# Patient Record
Sex: FEMALE | Race: WHITE | NOT HISPANIC OR LATINO | Employment: UNEMPLOYED | URBAN - METROPOLITAN AREA
[De-identification: names, ages, dates, MRNs, and addresses within clinical notes are randomized per-mention and may not be internally consistent; named-entity substitution may affect disease eponyms.]

---

## 2020-12-10 ENCOUNTER — CONVERSION ENCOUNTER (OUTPATIENT)
Dept: OTOLARYNGOLOGY | Facility: CLINIC | Age: 50
End: 2020-12-10
Attending: NURSE PRACTITIONER

## 2021-05-10 NOTE — H&P
History and Physical      Patient Name: Holly Apple   Patient ID: [id number]   Sex: Female   YOB: 1970    Primary Care Provider: Abimael Doty MD   Referring Provider: Caitie CANNON    Visit Date: December 10, 2020    Provider: KASSANDRA Guajardo   Location: [facility]   Location Address: [address] [place] [address], [address] [place] [address]  [id number]   Location Phone: [phone]          Chief Complaint     1.  Right otalgia    2.  Hearing loss       History Of Present Illness  Holly Apple is a 50 year old female who presents to the office today as a consult from Caitie CANNON.      She presents the clinic today for evaluation of her ears.  She reports she has had intermittent issues with right-sided otalgia for the past 2 years.  She reports that she feels pressure in her ear and almost a constant irritation.  She states that sometimes she will have sharp pains and she feels like her hearing has worsened.  She denies issues with recurrent otitis media infections or otorrhea.  She does have issues with allergic rhinitis and occasional sinusitis infections.  She was recently treated with an antibiotic that she states did not improve her ear symptoms.  She does often have some clear rhinitis and postnasal drainage.  She recently switched from taking Allegra daily to Xyzal.  She does not use any nasal sprays.  She states that she feels like she has hearing loss because her mother yells around her a lot when she was a child.       Past Medical History  Ear pain, right         Past Surgical History  *No Past Surgical History         Medication List  lisinopril 30 mg oral tablet         Allergy List  NO KNOWN DRUG ALLERGIES         Family Medical History  Family history of diabetes mellitus         Social History  Tobacco (Former)         Review of Systems  · Constitutional  o Denies  o : fever, night sweats, weight loss  · Eyes  o Admits  o : impaired  "vision  o Denies  o : discharge from eye  · HENT  o Admits  o : *See HPI  · Cardiovascular  o Admits  o : irregular heart beats  o Denies  o : chest pain  · Respiratory  o Denies  o : shortness of breath, wheezing, coughing up blood  · Gastrointestinal  o Admits  o : heartburn, reflux  o Denies  o : vomiting blood  · Genitourinary  o Admits  o : frequency  · Integument  o Admits  o : rash, skin dryness  · Neurologic  o Admits  o : dizziness  o Denies  o : seizures, loss of balance, loss of consciousness  · Endocrine  o Denies  o : cold intolerance, heat intolerance  · Heme-Lymph  o Denies  o : easy bleeding, anemia      Vitals  Date Time BP Position Site L\R Cuff Size HR RR TEMP (F) WT  HT  BMI kg/m2 BSA m2 O2 Sat FR L/min FiO2 HC       12/10/2020 11:39 AM        97.1 200lbs 8oz 5'  4.5\" 33.88 2.03             Physical Examination  · Constitutional  o Appearance  o : well developed, well-nourished, alert and in no acute distress, voice clear and strong  · Head and Face  o Head  o :   § Inspection  § : no deformities or lesions  o Face  o :   § Inspection  § : No facial lesions; House-Brackmann I/VI bilaterally  § Palpation  § : Right-sided TMJ tenderness, no crepitus, right-sided  muscle tenderness  · Eyes  o Vision  o :   § Visual Fields  § : Extraocular movements are intact. No spontaneous or gaze-induced nystagmus.  o Conjunctivae  o : clear  o Sclerae  o : clear  o Pupils and Irises  o : pupils equal, round, and reactive to light.   · Ears, Nose, Mouth and Throat  o Ears  o :   § External Ears  § : appearance within normal limits, no lesions present  § Otoscopic Examination  § : tympanic membrane appearance within normal limits bilaterally without perforations, well-aerated middle ears  § Hearing  § : intact to conversational voice both ears. Tuning fork testing: Hurtado: No lateralization. Rinne: Positive bilaterally.  o Nose  o :   § External Nose  § : appearance normal  § Intranasal Exam  § : mucosa " within normal limits, vestibules normal, no intranasal lesions present, septum midline, sinuses non tender to percussion  o Oral Cavity  o :   § Oral Mucosa  § : oral mucosa normal without pallor or cyanosis  § Lips  § : lip appearance normal  § Teeth  § : normal dentition for age  § Gums  § : gums pink, non-swollen, no bleeding present  § Tongue  § : tongue appearance normal; normal mobility  § Palate  § : hard palate normal, soft palate appearance normal with symmetric mobility  o Throat  o :   § Oropharynx  § : no inflammation or lesions present, tonsils within normal limits  · Neck  o Inspection/Palpation  o : normal appearance, no masses or tenderness, trachea midline; thyroid size normal, nontender, no nodules or masses present on palpation  · Respiratory  o Respiratory Effort  o : breathing unlabored  o Inspection of Chest  o : normal appearance, no retractions  · Lymphatic  o Neck  o : no lymphadenopathy present  o Supraclavicular Nodes  o : no lymphadenopathy present  o Preauricular Nodes  o : no lymphadenopathy present  · Skin and Subcutaneous Tissue  o General Inspection  o : Regarding face and neck - there are no rashes present, no lesions present, and no areas of discoloration  · Neurologic  o Cranial Nerves  o : cranial nerves II-XII are grossly intact bilaterally  o Gait and Station  o : normal gait, able to stand without diffculty  · Psychiatric  o Judgement and Insight  o : judgment and insight intact  o Mood and Affect  o : mood normal, affect appropriate          Assessment  · TMJ syndrome     524.69/M26.629  · Otalgia, right     388.70/H92.01      Plan  · Orders  o Audiometry, pure-tone (threshold); [facility] (82799) - 388.70/H92.01 - 12/10/2020  o Tympanogram (Impedance Testing) [facility] ([id number]) - 388.70/H92.01 - 12/10/2020  · Medications  o diclofenac sodium 50 mg oral tablet,delayed release (DR/EC)   SIG: take 1 tablet (50 mg) by oral route 2 times per day for 10 days   DISP: (20) Tablet with 0  refills  Prescribed on 12/10/2020     o Medications have been Reconciled  o Transition of Care or Provider Policy  · Instructions  o She presents the clinic today for evaluation of her ears. She reports she has had intermittent issues with right-sided otalgia for the past 2 years. She reports that she feels pressure in her ear and almost a constant irritation. She states that sometimes she will have sharp pains and she feels like her hearing has worsened. She denies issues with recurrent otitis media infections or otorrhea. She does have issues with allergic rhinitis and occasional sinusitis infections. She was recently treated with an antibiotic that she states did not improve her ear symptoms. She does often have some clear rhinitis and postnasal drainage. She recently switched from taking Allegra daily to Xyzal. She does not use any nasal sprays. She states that she feels like she has hearing loss because her mother yells around her a lot when she was a child. On examination today bilateral external auditory canals and bilateral tympanic membrane appearance is within normal limits. There are no perforations and middle ears are well aerated. Tuning fork testing shows Hurtado with no lateralization and Rinne positive bilaterally. She does have tenderness around her right side temporomandibular joint and right  muscles. There is no TMJ crepitus. We did obtain an audiogram and tympanogram. The audiogram shows normal hearing bilaterally. Speech reception threshold was at 20 dB on the right and 15 dB on the left. Word discrimination scores were 96% on the right and 100% on the left to 5 dB. Tympanograms were normal bilaterally. We discussed the normal results of her testing and physical exam today. I feel like her right-sided otalgia is likely coming from temporomandibular joint inflammation. I will start her on a short course of diclofenac to see if this helps with the pain. Additionally have instructed her to  go on a soft food diet for period of 4 to 6 weeks trial warm compresses,  muscle massage and gentle stretching. I will plan to see her back on an as-needed basis.  o Electronically Identified Patient Education Materials Provided Electronically  · Correspondence  o ENT Letter to Referring MD (Caitie CANNON) - 12/10/2020            Electronically Signed by: KASSANDRA Guajardo -Author on December 10, 2020 01:06:45 PM

## 2021-05-13 ENCOUNTER — OFFICE VISIT CONVERTED (OUTPATIENT)
Dept: CARDIOLOGY | Facility: CLINIC | Age: 51
End: 2021-05-13
Attending: SPECIALIST

## 2021-05-14 VITALS — HEIGHT: 64 IN | WEIGHT: 200.5 LBS | TEMPERATURE: 97.1 F | BODY MASS INDEX: 34.23 KG/M2

## 2021-05-22 ENCOUNTER — TRANSCRIBE ORDERS (OUTPATIENT)
Dept: LAB | Facility: HOSPITAL | Age: 51
End: 2021-05-22

## 2021-05-22 ENCOUNTER — TRANSCRIBE ORDERS (OUTPATIENT)
Dept: ADMINISTRATIVE | Facility: HOSPITAL | Age: 51
End: 2021-05-22

## 2021-05-22 ENCOUNTER — TRANSCRIBE ORDERS (OUTPATIENT)
Dept: CARDIOLOGY | Facility: CLINIC | Age: 51
End: 2021-05-22

## 2021-05-22 DIAGNOSIS — R00.2 PALPITATIONS: ICD-10-CM

## 2021-05-22 DIAGNOSIS — Z01.818 PRE-OP TESTING: Primary | ICD-10-CM

## 2021-05-22 DIAGNOSIS — R07.9 CHEST PAIN, UNSPECIFIED TYPE: Primary | ICD-10-CM

## 2021-05-22 DIAGNOSIS — Z12.39 SCREENING BREAST EXAMINATION: Primary | ICD-10-CM

## 2021-05-23 ENCOUNTER — TRANSCRIBE ORDERS (OUTPATIENT)
Dept: CARDIOLOGY | Facility: CLINIC | Age: 51
End: 2021-05-23

## 2021-05-23 DIAGNOSIS — R07.9 CHEST PAIN, UNSPECIFIED TYPE: ICD-10-CM

## 2021-05-23 DIAGNOSIS — R00.2 PALPITATIONS: Primary | ICD-10-CM

## 2021-06-05 NOTE — H&P
History and Physical      Patient Name: Holly Apple   Patient ID: [id number]   Sex: Female   YOB: 1970    Primary Care Provider: Abimael Doty MD   Referring Provider: Abimael Doty MD    Visit Date: May 13, 2021    Provider: Micah Almazan MD   Location: [facility]   Location Address: [address] [place] [address], [address]   [place]  [id number]   Location Phone: [phone]          Chief Complaint     Chest pain.  Palpitations.  Shortness of breath.       History Of Present Illness  Consult requested by: Abimael Doty MD   Holly Apple is a 50 year old /White female with a history of mitral valve prolapse who has had chest pain on and off for the last several months. It is substernal, aching, lasts for a few seconds to a few minutes, relieves spontaneously. She also has had palpitations, which last for a few minutes to several minutes with shortness of breath, which lasts for a few minutes. No aggravating or alleviating factors. No syncopal or presyncopal episodes. Cardiac risk factors: History of hypertension positive. Prior smoker. No history of diabetes mellitus. Family history for coronary artery disease is positive.   PAST MEDICAL HISTORY: Arthritis; Hypertension; Mitral valve prolapse.   FAMILY HISTORY: Positive for diabetes mellitus.   PSYCHOSOCIAL HISTORY: Previous tobacco use, but quit. Rarely consumes alcohol. Daily caffeine. .   CURRENT MEDICATIONS: Allegra 180 mg daily; lisinopril 30 mg daily.   ALLERGIES: Diflucan.       Review of Systems  · Constitutional  o Admits  o : fatigue, good general health lately, recent weight changes   · Eyes  o Admits  o : blurred vision  · HENT  o Denies  o : hearing loss or ringing, chronic sinus problem, swollen glands in neck  · Cardiovascular  o Admits  o : chest pain, palpitations (fast, fluttering, or skipping beats), shortness of breath while walking or lying flat  o Denies  o : swelling (feet, ankles,  "hands)  · Respiratory  o Admits  o : asthma or wheezing  o Denies  o : COPD  · Gastrointestinal  o Denies  o : ulcers, nausea or vomiting  · Neurologic  o Admits  o : lightheaded or dizzy  o Denies  o : stroke, headaches  · Musculoskeletal  o Admits  o : joint pain, back pain  · Endocrine  o Admits  o : heat or cold intolerance, excessive thirst or urination  o Denies  o : thyroid disease, diabetes  · Heme-Lymph  o Denies  o : bleeding or bruising tendency, anemia      Vitals  Date Time BP Position Site L\R Cuff Size HR RR TEMP (F) WT  HT  BMI kg/m2 BSA m2 O2 Sat FR L/min FiO2 HC       05/13/2021 12:51 /76 Sitting    94 - R   200lbs 0oz 5'  4\" 34.33 2.02             Physical Examination  · Constitutional  o Appearance  o : Awake, alert, cooperative, pleasant.  · Eyes  o Pupils and Irises  o : Pupils equal and reacting to light and accommodation.  · Ears, Nose, Mouth and Throat  o Ears  o : Tympanic membranes are normal.  o Nose  o : Clear with no maxillary tenderness.  o Oral Cavity  o : Clear.  · Neck  o Inspection/Palpation  o : No JVD, bruits, thyromegaly, or adenopathy. Trachea midline. No axillary or cervical lymphadenopathy.  o Thyroid  o : No thyroid enlargement.   · Respiratory  o Inspection of Chest  o : No chest wall deformities, moving equal.  o Auscultation of Lungs  o : Good air entry with vesicular breath sounds.  o Percussion of Chest  o : Resonant bilaterally.   o Palpation of Chest  o : Equal movements bilaterally.  · Cardiovascular  o Heart  o :   § Auscultation of Heart  § : PMI is in the 5th intercostal space. S1 and S2 regular. No S3. No S4.   o Peripheral Vascular System  o :   § Extremities  § : No pedal edema. Peripheral pulses well felt. No cyanosis.  · Gastrointestinal  o Abdominal Examination  o : No organomegaly, masses, tenderness, bruits, or abnormal pulsations. Bowel sounds are normal.  · Musculoskeletal  o General  o : Muscle strength is normal with normal tone.  · Skin and " Subcutaneous Tissue  o General Inspection  o : No rash, petechiae, or suspicious skin lesions. Skin turgor is normal.  · EKG  o EKG  o : Performed in the office today.  o Indications  o : Palpitations.  o Results  o : Sinus rhythm. Q waves in the inferior leads.           Assessment     ASSESSMENT & PLAN:    1.  Precordial atypical chest pain and abnormal EKG.  We will do a treadmill stress test to rule out any        significant ischemia.    2.  Palpitations and shortness of breath.  We will do an echocardiogram to evaluate the left ventricular systolic        function and a 24-hour Holter.  3.  Essential hypertension, controlled.  Continue current dose of lisinopril.  4.  Positive nicotine use.  Smoking cessation instructions were discussed with the patient.             Electronically Signed by: Rossi Wolf-, Other -Author on May 15, 2021 11:42:46 AM  Electronically Co-signed by: Micah Almazan MD -Reviewer on May 17, 2021 08:47:52 AM

## 2021-06-07 ENCOUNTER — LAB (OUTPATIENT)
Dept: LAB | Facility: HOSPITAL | Age: 51
End: 2021-06-07

## 2021-06-07 DIAGNOSIS — Z01.818 PRE-OP TESTING: ICD-10-CM

## 2021-06-07 PROCEDURE — U0003 INFECTIOUS AGENT DETECTION BY NUCLEIC ACID (DNA OR RNA); SEVERE ACUTE RESPIRATORY SYNDROME CORONAVIRUS 2 (SARS-COV-2) (CORONAVIRUS DISEASE [COVID-19]), AMPLIFIED PROBE TECHNIQUE, MAKING USE OF HIGH THROUGHPUT TECHNOLOGIES AS DESCRIBED BY CMS-2020-01-R: HCPCS | Performed by: NURSE PRACTITIONER

## 2021-06-07 PROCEDURE — C9803 HOPD COVID-19 SPEC COLLECT: HCPCS

## 2021-06-07 RX ORDER — FEXOFENADINE HCL 180 MG/1
180 TABLET ORAL NIGHTLY
COMMUNITY

## 2021-06-07 RX ORDER — LISINOPRIL 30 MG/1
30 TABLET ORAL DAILY
COMMUNITY
End: 2022-07-27

## 2021-06-08 LAB — SARS-COV-2 RNA RESP QL NAA+PROBE: NOT DETECTED

## 2021-06-11 ENCOUNTER — ANESTHESIA (OUTPATIENT)
Dept: PERIOP | Facility: HOSPITAL | Age: 51
End: 2021-06-11

## 2021-06-11 ENCOUNTER — ANESTHESIA EVENT (OUTPATIENT)
Dept: PERIOP | Facility: HOSPITAL | Age: 51
End: 2021-06-11

## 2021-06-11 ENCOUNTER — HOSPITAL ENCOUNTER (INPATIENT)
Facility: HOSPITAL | Age: 51
LOS: 1 days | Discharge: HOME | End: 2021-06-12
Attending: OBSTETRICS & GYNECOLOGY | Admitting: OBSTETRICS & GYNECOLOGY

## 2021-06-11 DIAGNOSIS — Z90.710 S/P VAGINAL HYSTERECTOMY: ICD-10-CM

## 2021-06-11 DIAGNOSIS — N81.4 UTERINE PROLAPSE WITHOUT VAGINAL WALL PROLAPSE: Primary | ICD-10-CM

## 2021-06-11 PROBLEM — N75.0 BARTHOLIN GLAND CYST: Status: ACTIVE | Noted: 2021-06-11

## 2021-06-11 PROBLEM — I10 ESSENTIAL HYPERTENSION: Status: ACTIVE | Noted: 2021-06-11

## 2021-06-11 LAB — B-HCG UR QL: NEGATIVE

## 2021-06-11 PROCEDURE — 81025 URINE PREGNANCY TEST: CPT | Performed by: OBSTETRICS & GYNECOLOGY

## 2021-06-11 PROCEDURE — 25010000003 CEFAZOLIN IN DEXTROSE 2-4 GM/100ML-% SOLUTION: Performed by: OBSTETRICS & GYNECOLOGY

## 2021-06-11 PROCEDURE — 25010000002 NEOSTIGMINE 10 MG/10ML SOLUTION 10 ML VIAL: Performed by: NURSE ANESTHETIST, CERTIFIED REGISTERED

## 2021-06-11 PROCEDURE — 25010000002 FENTANYL CITRATE (PF) 50 MCG/ML SOLUTION: Performed by: NURSE ANESTHETIST, CERTIFIED REGISTERED

## 2021-06-11 PROCEDURE — 0UT97ZZ RESECTION OF UTERUS, VIA NATURAL OR ARTIFICIAL OPENING: ICD-10-PCS | Performed by: OBSTETRICS & GYNECOLOGY

## 2021-06-11 PROCEDURE — 25010000002 HYDROMORPHONE 1 MG/ML SOLUTION: Performed by: OBSTETRICS & GYNECOLOGY

## 2021-06-11 PROCEDURE — 25010000002 DEXAMETHASONE PER 1 MG: Performed by: NURSE ANESTHETIST, CERTIFIED REGISTERED

## 2021-06-11 PROCEDURE — 25010000002 HYDROMORPHONE 1 MG/ML SOLUTION: Performed by: NURSE ANESTHETIST, CERTIFIED REGISTERED

## 2021-06-11 PROCEDURE — 88305 TISSUE EXAM BY PATHOLOGIST: CPT | Performed by: OBSTETRICS & GYNECOLOGY

## 2021-06-11 PROCEDURE — 25010000002 ONDANSETRON PER 1 MG: Performed by: NURSE ANESTHETIST, CERTIFIED REGISTERED

## 2021-06-11 PROCEDURE — 25010000002 MIDAZOLAM PER 1MG: Performed by: ANESTHESIOLOGY

## 2021-06-11 PROCEDURE — 0UBLXZZ EXCISION OF VESTIBULAR GLAND, EXTERNAL APPROACH: ICD-10-PCS | Performed by: OBSTETRICS & GYNECOLOGY

## 2021-06-11 PROCEDURE — 25010000002 PROPOFOL 10 MG/ML EMULSION: Performed by: NURSE ANESTHETIST, CERTIFIED REGISTERED

## 2021-06-11 PROCEDURE — 93005 ELECTROCARDIOGRAM TRACING: CPT | Performed by: OBSTETRICS & GYNECOLOGY

## 2021-06-11 RX ORDER — SODIUM CHLORIDE 0.9 % (FLUSH) 0.9 %
10 SYRINGE (ML) INJECTION AS NEEDED
Status: DISCONTINUED | OUTPATIENT
Start: 2021-06-11 | End: 2021-06-11 | Stop reason: HOSPADM

## 2021-06-11 RX ORDER — MIDAZOLAM HYDROCHLORIDE 1 MG/ML
2 INJECTION INTRAMUSCULAR; INTRAVENOUS ONCE
Status: COMPLETED | OUTPATIENT
Start: 2021-06-11 | End: 2021-06-11

## 2021-06-11 RX ORDER — OXYCODONE HYDROCHLORIDE 5 MG/1
5 TABLET ORAL ONCE AS NEEDED
Status: DISCONTINUED | OUTPATIENT
Start: 2021-06-11 | End: 2021-06-11 | Stop reason: HOSPADM

## 2021-06-11 RX ORDER — GLYCOPYRROLATE 0.2 MG/ML
INJECTION INTRAMUSCULAR; INTRAVENOUS AS NEEDED
Status: DISCONTINUED | OUTPATIENT
Start: 2021-06-11 | End: 2021-06-11 | Stop reason: SURG

## 2021-06-11 RX ORDER — NEOSTIGMINE METHYLSULFATE 1 MG/ML
INJECTION, SOLUTION INTRAVENOUS AS NEEDED
Status: DISCONTINUED | OUTPATIENT
Start: 2021-06-11 | End: 2021-06-11 | Stop reason: SURG

## 2021-06-11 RX ORDER — CEFAZOLIN SODIUM 2 G/100ML
2 INJECTION, SOLUTION INTRAVENOUS EVERY 8 HOURS
Status: COMPLETED | OUTPATIENT
Start: 2021-06-11 | End: 2021-06-12

## 2021-06-11 RX ORDER — ACETAMINOPHEN 500 MG
1000 TABLET ORAL ONCE
Status: COMPLETED | OUTPATIENT
Start: 2021-06-11 | End: 2021-06-11

## 2021-06-11 RX ORDER — IBUPROFEN 800 MG/1
800 TABLET ORAL EVERY 8 HOURS PRN
Qty: 60 TABLET | Refills: 0 | OUTPATIENT
Start: 2021-06-11 | End: 2022-09-15

## 2021-06-11 RX ORDER — ONDANSETRON 2 MG/ML
4 INJECTION INTRAMUSCULAR; INTRAVENOUS ONCE AS NEEDED
Status: DISCONTINUED | OUTPATIENT
Start: 2021-06-11 | End: 2021-06-11 | Stop reason: HOSPADM

## 2021-06-11 RX ORDER — SODIUM CHLORIDE 0.9 % (FLUSH) 0.9 %
10 SYRINGE (ML) INJECTION EVERY 12 HOURS SCHEDULED
Status: DISCONTINUED | OUTPATIENT
Start: 2021-06-11 | End: 2021-06-11 | Stop reason: HOSPADM

## 2021-06-11 RX ORDER — OXYCODONE HYDROCHLORIDE AND ACETAMINOPHEN 5; 325 MG/1; MG/1
1 TABLET ORAL EVERY 4 HOURS PRN
Status: DISCONTINUED | OUTPATIENT
Start: 2021-06-11 | End: 2021-06-12 | Stop reason: HOSPADM

## 2021-06-11 RX ORDER — FENTANYL CITRATE 50 UG/ML
INJECTION, SOLUTION INTRAMUSCULAR; INTRAVENOUS AS NEEDED
Status: DISCONTINUED | OUTPATIENT
Start: 2021-06-11 | End: 2021-06-11 | Stop reason: SURG

## 2021-06-11 RX ORDER — DEXAMETHASONE SODIUM PHOSPHATE 4 MG/ML
INJECTION, SOLUTION INTRA-ARTICULAR; INTRALESIONAL; INTRAMUSCULAR; INTRAVENOUS; SOFT TISSUE AS NEEDED
Status: DISCONTINUED | OUTPATIENT
Start: 2021-06-11 | End: 2021-06-11 | Stop reason: SURG

## 2021-06-11 RX ORDER — ONDANSETRON 2 MG/ML
4 INJECTION INTRAMUSCULAR; INTRAVENOUS EVERY 6 HOURS PRN
Status: DISCONTINUED | OUTPATIENT
Start: 2021-06-11 | End: 2021-06-11

## 2021-06-11 RX ORDER — ACETAMINOPHEN 325 MG/1
650 TABLET ORAL EVERY 6 HOURS
Status: DISCONTINUED | OUTPATIENT
Start: 2021-06-11 | End: 2021-06-12 | Stop reason: HOSPADM

## 2021-06-11 RX ORDER — MEPERIDINE HYDROCHLORIDE 25 MG/ML
12.5 INJECTION INTRAMUSCULAR; INTRAVENOUS; SUBCUTANEOUS
Status: DISCONTINUED | OUTPATIENT
Start: 2021-06-11 | End: 2021-06-11 | Stop reason: HOSPADM

## 2021-06-11 RX ORDER — ONDANSETRON 4 MG/1
4 TABLET, FILM COATED ORAL EVERY 6 HOURS PRN
Status: DISCONTINUED | OUTPATIENT
Start: 2021-06-11 | End: 2021-06-12 | Stop reason: HOSPADM

## 2021-06-11 RX ORDER — CEFAZOLIN SODIUM 2 G/100ML
2 INJECTION, SOLUTION INTRAVENOUS ONCE
Status: COMPLETED | OUTPATIENT
Start: 2021-06-11 | End: 2021-06-11

## 2021-06-11 RX ORDER — LISINOPRIL 10 MG/1
30 TABLET ORAL DAILY
Status: DISCONTINUED | OUTPATIENT
Start: 2021-06-11 | End: 2021-06-12 | Stop reason: HOSPADM

## 2021-06-11 RX ORDER — ONDANSETRON 2 MG/ML
INJECTION INTRAMUSCULAR; INTRAVENOUS AS NEEDED
Status: DISCONTINUED | OUTPATIENT
Start: 2021-06-11 | End: 2021-06-11 | Stop reason: SURG

## 2021-06-11 RX ORDER — LIDOCAINE HYDROCHLORIDE 20 MG/ML
INJECTION, SOLUTION INFILTRATION; PERINEURAL AS NEEDED
Status: DISCONTINUED | OUTPATIENT
Start: 2021-06-11 | End: 2021-06-11 | Stop reason: SURG

## 2021-06-11 RX ORDER — SODIUM CHLORIDE, SODIUM LACTATE, POTASSIUM CHLORIDE, CALCIUM CHLORIDE 600; 310; 30; 20 MG/100ML; MG/100ML; MG/100ML; MG/100ML
125 INJECTION, SOLUTION INTRAVENOUS CONTINUOUS
Status: DISCONTINUED | OUTPATIENT
Start: 2021-06-11 | End: 2021-06-11 | Stop reason: SDUPTHER

## 2021-06-11 RX ORDER — DEXMEDETOMIDINE HYDROCHLORIDE 100 UG/ML
INJECTION, SOLUTION INTRAVENOUS AS NEEDED
Status: DISCONTINUED | OUTPATIENT
Start: 2021-06-11 | End: 2021-06-11 | Stop reason: SURG

## 2021-06-11 RX ORDER — IBUPROFEN 600 MG/1
600 TABLET ORAL EVERY 6 HOURS SCHEDULED
Status: DISCONTINUED | OUTPATIENT
Start: 2021-06-11 | End: 2021-06-12 | Stop reason: HOSPADM

## 2021-06-11 RX ORDER — ACETAMINOPHEN 650 MG/1
650 SUPPOSITORY RECTAL EVERY 6 HOURS
Status: DISCONTINUED | OUTPATIENT
Start: 2021-06-11 | End: 2021-06-12 | Stop reason: HOSPADM

## 2021-06-11 RX ORDER — SODIUM CHLORIDE, SODIUM LACTATE, POTASSIUM CHLORIDE, CALCIUM CHLORIDE 600; 310; 30; 20 MG/100ML; MG/100ML; MG/100ML; MG/100ML
9 INJECTION, SOLUTION INTRAVENOUS CONTINUOUS PRN
Status: DISCONTINUED | OUTPATIENT
Start: 2021-06-11 | End: 2021-06-11 | Stop reason: HOSPADM

## 2021-06-11 RX ORDER — PROPOFOL 10 MG/ML
VIAL (ML) INTRAVENOUS AS NEEDED
Status: DISCONTINUED | OUTPATIENT
Start: 2021-06-11 | End: 2021-06-11 | Stop reason: SURG

## 2021-06-11 RX ORDER — OXYCODONE HYDROCHLORIDE AND ACETAMINOPHEN 5; 325 MG/1; MG/1
1 TABLET ORAL EVERY 6 HOURS PRN
Qty: 20 TABLET | Refills: 0 | Status: SHIPPED | OUTPATIENT
Start: 2021-06-11 | End: 2022-07-27

## 2021-06-11 RX ORDER — KETAMINE HYDROCHLORIDE 50 MG/ML
INJECTION, SOLUTION, CONCENTRATE INTRAMUSCULAR; INTRAVENOUS AS NEEDED
Status: DISCONTINUED | OUTPATIENT
Start: 2021-06-11 | End: 2021-06-11 | Stop reason: SURG

## 2021-06-11 RX ORDER — KETOROLAC TROMETHAMINE 30 MG/ML
30 INJECTION, SOLUTION INTRAMUSCULAR; INTRAVENOUS EVERY 6 HOURS PRN
Status: DISCONTINUED | OUTPATIENT
Start: 2021-06-11 | End: 2021-06-12 | Stop reason: HOSPADM

## 2021-06-11 RX ORDER — ROCURONIUM BROMIDE 10 MG/ML
INJECTION, SOLUTION INTRAVENOUS AS NEEDED
Status: DISCONTINUED | OUTPATIENT
Start: 2021-06-11 | End: 2021-06-11 | Stop reason: SURG

## 2021-06-11 RX ORDER — SODIUM CHLORIDE 0.9 % (FLUSH) 0.9 %
3 SYRINGE (ML) INJECTION EVERY 12 HOURS SCHEDULED
Status: DISCONTINUED | OUTPATIENT
Start: 2021-06-11 | End: 2021-06-11 | Stop reason: HOSPADM

## 2021-06-11 RX ORDER — NALOXONE HCL 0.4 MG/ML
0.1 VIAL (ML) INJECTION
Status: DISCONTINUED | OUTPATIENT
Start: 2021-06-11 | End: 2021-06-12 | Stop reason: HOSPADM

## 2021-06-11 RX ORDER — SODIUM CHLORIDE, SODIUM LACTATE, POTASSIUM CHLORIDE, CALCIUM CHLORIDE 600; 310; 30; 20 MG/100ML; MG/100ML; MG/100ML; MG/100ML
125 INJECTION, SOLUTION INTRAVENOUS CONTINUOUS
Status: DISCONTINUED | OUTPATIENT
Start: 2021-06-11 | End: 2021-06-12 | Stop reason: HOSPADM

## 2021-06-11 RX ORDER — GINSENG 100 MG
CAPSULE ORAL AS NEEDED
Status: DISCONTINUED | OUTPATIENT
Start: 2021-06-11 | End: 2021-06-11 | Stop reason: HOSPADM

## 2021-06-11 RX ADMIN — ACETAMINOPHEN 1000 MG: 500 TABLET ORAL at 08:40

## 2021-06-11 RX ADMIN — GLYCOPYRROLATE 0.4 MCG: 0.2 INJECTION INTRAMUSCULAR; INTRAVENOUS at 11:01

## 2021-06-11 RX ADMIN — ACETAMINOPHEN 650 MG: 325 TABLET ORAL at 21:37

## 2021-06-11 RX ADMIN — DEXAMETHASONE SODIUM PHOSPHATE 4 MG: 4 INJECTION INTRA-ARTICULAR; INTRALESIONAL; INTRAMUSCULAR; INTRAVENOUS; SOFT TISSUE at 09:56

## 2021-06-11 RX ADMIN — LIDOCAINE HYDROCHLORIDE 100 MG: 20 INJECTION, SOLUTION INFILTRATION; PERINEURAL at 09:51

## 2021-06-11 RX ADMIN — SODIUM CHLORIDE, POTASSIUM CHLORIDE, SODIUM LACTATE AND CALCIUM CHLORIDE 9 ML/HR: 600; 310; 30; 20 INJECTION, SOLUTION INTRAVENOUS at 08:41

## 2021-06-11 RX ADMIN — HYDROMORPHONE HYDROCHLORIDE 0.5 MG: 1 INJECTION, SOLUTION INTRAMUSCULAR; INTRAVENOUS; SUBCUTANEOUS at 15:14

## 2021-06-11 RX ADMIN — IBUPROFEN 600 MG: 600 TABLET, FILM COATED ORAL at 23:30

## 2021-06-11 RX ADMIN — CEFAZOLIN SODIUM 2 G: 2 INJECTION, SOLUTION INTRAVENOUS at 20:09

## 2021-06-11 RX ADMIN — SODIUM CHLORIDE, POTASSIUM CHLORIDE, SODIUM LACTATE AND CALCIUM CHLORIDE 125 ML/HR: 600; 310; 30; 20 INJECTION, SOLUTION INTRAVENOUS at 11:49

## 2021-06-11 RX ADMIN — KETAMINE HYDROCHLORIDE 25 MG: 50 INJECTION, SOLUTION INTRAMUSCULAR; INTRAVENOUS at 09:51

## 2021-06-11 RX ADMIN — DEXMEDETOMIDINE HYDROCHLORIDE 25 MCG: 100 INJECTION, SOLUTION INTRAVENOUS at 09:49

## 2021-06-11 RX ADMIN — ONDANSETRON 4 MG: 2 INJECTION INTRAMUSCULAR; INTRAVENOUS at 10:04

## 2021-06-11 RX ADMIN — HYDROMORPHONE HYDROCHLORIDE 0.25 MG: 1 INJECTION, SOLUTION INTRAMUSCULAR; INTRAVENOUS; SUBCUTANEOUS at 12:27

## 2021-06-11 RX ADMIN — DEXMEDETOMIDINE HYDROCHLORIDE 25 MCG: 100 INJECTION, SOLUTION INTRAVENOUS at 10:03

## 2021-06-11 RX ADMIN — KETAMINE HYDROCHLORIDE 25 MG: 50 INJECTION, SOLUTION INTRAMUSCULAR; INTRAVENOUS at 10:12

## 2021-06-11 RX ADMIN — FENTANYL CITRATE 100 MCG: 50 INJECTION INTRAMUSCULAR; INTRAVENOUS at 09:50

## 2021-06-11 RX ADMIN — HYDROMORPHONE HYDROCHLORIDE 0.5 MG: 1 INJECTION, SOLUTION INTRAMUSCULAR; INTRAVENOUS; SUBCUTANEOUS at 11:47

## 2021-06-11 RX ADMIN — PROPOFOL 150 MG: 10 INJECTION, EMULSION INTRAVENOUS at 09:52

## 2021-06-11 RX ADMIN — MIDAZOLAM HYDROCHLORIDE 2 MG: 1 INJECTION, SOLUTION INTRAMUSCULAR; INTRAVENOUS at 09:31

## 2021-06-11 RX ADMIN — SODIUM CHLORIDE, POTASSIUM CHLORIDE, SODIUM LACTATE AND CALCIUM CHLORIDE: 600; 310; 30; 20 INJECTION, SOLUTION INTRAVENOUS at 10:31

## 2021-06-11 RX ADMIN — SODIUM CHLORIDE, POTASSIUM CHLORIDE, SODIUM LACTATE AND CALCIUM CHLORIDE 125 ML/HR: 600; 310; 30; 20 INJECTION, SOLUTION INTRAVENOUS at 21:37

## 2021-06-11 RX ADMIN — IBUPROFEN 600 MG: 600 TABLET, FILM COATED ORAL at 18:38

## 2021-06-11 RX ADMIN — ACETAMINOPHEN 650 MG: 325 TABLET ORAL at 17:07

## 2021-06-11 RX ADMIN — ROCURONIUM BROMIDE 50 MG: 10 INJECTION INTRAVENOUS at 09:52

## 2021-06-11 RX ADMIN — NEOSTIGMINE METHYLSULFATE 4 MG: 1 INJECTION, SOLUTION INTRAVENOUS at 11:01

## 2021-06-11 RX ADMIN — CEFAZOLIN SODIUM 2 G: 2 INJECTION, SOLUTION INTRAVENOUS at 09:49

## 2021-06-11 NOTE — OP NOTE
VAGINAL HYSTERECTOMY  Procedure Report    Patient Name:  Holly Apple  YOB: 1970    Date of Surgery:  6/11/2021     Indications:  Patient is 49 yo female with uterovaginal prolapse and right Bartholin gland cyst.    Pre-op Diagnosis:   UTERINE PROLAPSE.  Right Bartholin gland cyst.       Post-Op Diagnosis Codes:   Same    Procedure/CPT® Codes:    Procedure(s):  VAGINAL HYSTERECTOMY.  Marsupialization of Bartholin gland cyst.    Staff:  Surgeon(s):  Popeye Fox MD         Anesthesia: General    Estimated Blood Loss: 40 mL    Implants:    Nothing was implanted during the procedure    Specimen:          Specimens     ID Source Type Tests Collected By Collected At Frozen?    A Uterus with Cervix Tissue · TISSUE PATHOLOGY EXAM   Popeye Fxo MD 6/11/21 1021 No    Description: VAGINAL HYSTERECTOMY              Findings: 3rd degree uterine prolapse.  5cm right Bartholin gland cyst.  Normal ovaries.    Complications: None    Description of Procedure: Patient was taken to the OR where general anesthesia was obtained without any difficulty.  She was then prepped and draped in high lithotomy position.  Weighted speculum was placed in the vagina.  Cervix was grasped with clamp.  Cervix was then injected with marcaine diluted 50:50 with normal saline.Cervix was then incised sharply circumferencially.  Pubo-cervical fascia was incised sharply and anterior cul de sac was entered sharply.  The karen  rectal fascia was dissected off vaginal mucosae and posterior cul de sac was entered sharply.  Uterosacral ligaments on both sides was clamped, cut and ligated with 0 vicryl sutures.  Uterine arteries on both sides were clamped,cut and ligated with 0 vicryl.  The utero ovarian ligaments were then clamped and uterus and cervix were amputated sharply.  The pedicles were ligated with 0 vicryl in figure of eight fashion.  The pedicle lines on both sides were inspected and hemostasis was assred.  The edges of  the vaginal cuff were approximated with 0 vicryl sutures in interruped fashion.  Fole catheter was placed with return of yellow urine.  At this time attention was turned to performing marsupialization of Bartholin gland.  Vaginal mucosae overlying the Bartholin cyst was injected with marcaine.  The vaginal mucosae was then incised sharply and gland wall was opened sharpy with return of green fluid.  Edges of bartholin gland cyst were approximated to the vulvar mucosae with 3-0 vicryl sutures in interrupted fashion.  Antibiotic ointment was placed on incision site.  Sponge,laps and needles count was correct x2.  Patient was taken to the  in stable condition.            Popeye Fox MD     Date: 6/11/2021  Time: 11:15 EDT

## 2021-06-11 NOTE — ANESTHESIA PREPROCEDURE EVALUATION
Anesthesia Evaluation     Patient summary reviewed and Nursing notes reviewed   NPO Solid Status: > 8 hours  NPO Liquid Status: > 2 hours           Airway   Mallampati: III  TM distance: >3 FB  Dental - normal exam     Pulmonary - normal exam   Cardiovascular - normal exam    (+) hypertension,       Neuro/Psych  GI/Hepatic/Renal/Endo      Musculoskeletal     Abdominal    Substance History      OB/GYN          Other   arthritis,                      Anesthesia Plan    ASA 2     general     intravenous induction     Anesthetic plan, all risks, benefits, and alternatives have been provided, discussed and informed consent has been obtained with: patient and child.

## 2021-06-11 NOTE — ANESTHESIA PROCEDURE NOTES
Airway  Urgency: elective    Date/Time: 6/11/2021 9:54 AM  End Time:6/11/2021 10:55 AM  Airway not difficult    General Information and Staff    Patient location during procedure: OR  CRNA: Vasile Gustafson CRNA    Indications and Patient Condition  Indications for airway management: airway protection    Preoxygenated: yes  MILS maintained throughout  Mask difficulty assessment: 1 - vent by mask    Final Airway Details  Final airway type: endotracheal airway      Successful airway: ETT  Cuffed: yes   Successful intubation technique: video laryngoscopy  Endotracheal tube insertion site: oral  Blade: Glidescope  Blade size: 3  ETT size (mm): 7.5  Cormack-Lehane Classification: grade I - full view of glottis  Placement verified by: chest auscultation, capnometry and palpation of cuff   Measured from: lips  ETT/EBT  to lips (cm): 22  Number of attempts at approach: 1  Assessment: lips, teeth, and gum same as pre-op and atraumatic intubation

## 2021-06-11 NOTE — ANESTHESIA POSTPROCEDURE EVALUATION
Patient: Holly Apple    Procedure Summary     Date: 06/11/21 Room / Location: [facility] OR 04 / [facility] MAIN OR    Anesthesia Start: 0945 Anesthesia Stop: 1109    Procedure: VAGINAL HYSTERECTOMY (N/A Uterus) Diagnosis: (UTERINE PROLAPSE)    Surgeons: Popeye Fox MD Provider: Trina Ann MD    Anesthesia Type: general ASA Status: 2          Anesthesia Type: general    Vitals  Vitals Value Taken Time   BP 91/64 06/11/21 1145   Temp 36.2 °C (97.2 °F) 06/11/21 1109   Pulse 48 06/11/21 1147   Resp 16 06/11/21 1130   SpO2 99 % 06/11/21 1147   Vitals shown include unvalidated device data.        Post Anesthesia Care and Evaluation    Patient location during evaluation: bedside  Patient participation: complete - patient participated  Level of consciousness: awake  Pain score: 0  Pain management: adequate  Airway patency: patent  Anesthetic complications: No anesthetic complications  PONV Status: none  Cardiovascular status: acceptable and stable  Respiratory status: acceptable and room air  Hydration status: acceptable

## 2021-06-11 NOTE — H&P
[facility]   PREOPERATIVE HISTORY AND PHYSICAL    Patient Name:Holly Apple  : 1970  MRN: [id number]  Primary Care Physician: Samuel Frey MD  Date of admission: 2021    Subjective   Subjective     Chief Complaint: preoperative evaluation    History of Present Illness  Holly Apple is a 50 y.o. female who presents for preoperative evaluation. She is scheduled for VAGINAL HYSTERECTOMY (N/A) and marsupilization of Bartholin Gland cyst.    Review of Systems     Personal History     Past Medical History:   Diagnosis Date   • DDD (degenerative disc disease), cervical    • Eczema     GENERALIZED   • Hypertension    • Seasonal allergies    • Tuberculosis     NONACTIVE, NO CURRENT ISSUES, FOLLOWS W/PCP   • Uterine prolapse        Past Surgical History:   Procedure Laterality Date   • WISDOM TOOTH EXTRACTION         Family History: Her family history is not on file.     Social History: She  reports that she has quit smoking. She does not have any smokeless tobacco history on file. She reports that she does not drink alcohol and does not use drugs.    Home Medications:  fexofenadine and lisinopril    Allergies:  She is allergic to diethyl toluamide (deet), diflucan [fluconazole], wasp venom, eggs or egg-derived products, measles virus vaccine, and peanut-containing drug products.    Objective    Objective     Vitals:    Temp:  [96.9 °F (36.1 °C)] 96.9 °F (36.1 °C)  Heart Rate:  [84] 84  Resp:  [16] 16  BP: (132)/(79) 132/79    Physical Exam  Constitutional:       Appearance: Normal appearance.   HENT:      Head: Normocephalic.   Cardiovascular:      Rate and Rhythm: Normal rate and regular rhythm.   Pulmonary:      Effort: Pulmonary effort is normal.      Breath sounds: Normal breath sounds.   Abdominal:      General: Abdomen is flat. Bowel sounds are normal.      Palpations: Abdomen is soft.   Musculoskeletal:      Cervical back: Neck supple.   Neurological:      Mental Status: She is  alert.         Assessment/Plan   Assessment / Plan     Brief Patient Summary:  Holly Apple is a 50 y.o. female who presents for preoperative evaluation.      Active Hospital Problems:  Active Hospital Problems    Diagnosis    • **Uterine prolapse without vaginal wall prolapse    • Bartholin gland cyst      Plan:   Procedure(s):  VAGINAL HYSTERECTOMY    The risks, benefits, and alternatives of the procedure were discussed in detail with the patient and questions were answered. No guarantees were made or implied. Informed consent was obtained.

## 2021-06-12 ENCOUNTER — DOCUMENTATION (OUTPATIENT)
Dept: OBSTETRICS AND GYNECOLOGY | Facility: HOSPITAL | Age: 51
End: 2021-06-12

## 2021-06-12 VITALS
HEIGHT: 64 IN | WEIGHT: 203.48 LBS | OXYGEN SATURATION: 98 % | RESPIRATION RATE: 18 BRPM | SYSTOLIC BLOOD PRESSURE: 110 MMHG | BODY MASS INDEX: 34.74 KG/M2 | TEMPERATURE: 97.5 F | HEART RATE: 98 BPM | DIASTOLIC BLOOD PRESSURE: 62 MMHG

## 2021-06-12 PROBLEM — N75.0 BARTHOLIN GLAND CYST: Status: RESOLVED | Noted: 2021-06-11 | Resolved: 2021-06-12

## 2021-06-12 PROBLEM — N81.4 UTERINE PROLAPSE WITHOUT VAGINAL WALL PROLAPSE: Status: RESOLVED | Noted: 2021-06-11 | Resolved: 2021-06-12

## 2021-06-12 LAB
BASOPHILS # BLD AUTO: 0.01 10*3/MM3 (ref 0–0.2)
BASOPHILS NFR BLD AUTO: 0.1 % (ref 0–1.5)
DEPRECATED RDW RBC AUTO: 39.5 FL (ref 37–54)
EOSINOPHIL # BLD AUTO: 0 10*3/MM3 (ref 0–0.4)
EOSINOPHIL NFR BLD AUTO: 0 % (ref 0.3–6.2)
ERYTHROCYTE [DISTWIDTH] IN BLOOD BY AUTOMATED COUNT: 14.1 % (ref 12.3–15.4)
HCT VFR BLD AUTO: 34 % (ref 34–46.6)
HGB BLD-MCNC: 11.1 G/DL (ref 12–15.9)
IMM GRANULOCYTES # BLD AUTO: 0.05 10*3/MM3 (ref 0–0.05)
IMM GRANULOCYTES NFR BLD AUTO: 0.4 % (ref 0–0.5)
LYMPHOCYTES # BLD AUTO: 1.77 10*3/MM3 (ref 0.7–3.1)
LYMPHOCYTES NFR BLD AUTO: 15 % (ref 19.6–45.3)
MCH RBC QN AUTO: 25.1 PG (ref 26.6–33)
MCHC RBC AUTO-ENTMCNC: 32.6 G/DL (ref 31.5–35.7)
MCV RBC AUTO: 76.9 FL (ref 79–97)
MONOCYTES # BLD AUTO: 0.64 10*3/MM3 (ref 0.1–0.9)
MONOCYTES NFR BLD AUTO: 5.4 % (ref 5–12)
NEUTROPHILS NFR BLD AUTO: 79.1 % (ref 42.7–76)
NEUTROPHILS NFR BLD AUTO: 9.3 10*3/MM3 (ref 1.7–7)
NRBC BLD AUTO-RTO: 0 /100 WBC (ref 0–0.2)
PLATELET # BLD AUTO: 264 10*3/MM3 (ref 140–450)
PMV BLD AUTO: 8.9 FL (ref 6–12)
RBC # BLD AUTO: 4.42 10*6/MM3 (ref 3.77–5.28)
WBC # BLD AUTO: 11.77 10*3/MM3 (ref 3.4–10.8)

## 2021-06-12 PROCEDURE — 94799 UNLISTED PULMONARY SVC/PX: CPT

## 2021-06-12 PROCEDURE — 25010000003 CEFAZOLIN IN DEXTROSE 2-4 GM/100ML-% SOLUTION: Performed by: OBSTETRICS & GYNECOLOGY

## 2021-06-12 PROCEDURE — 85025 COMPLETE CBC W/AUTO DIFF WBC: CPT | Performed by: OBSTETRICS & GYNECOLOGY

## 2021-06-12 RX ADMIN — IBUPROFEN 600 MG: 600 TABLET, FILM COATED ORAL at 12:45

## 2021-06-12 RX ADMIN — ACETAMINOPHEN 650 MG: 325 TABLET ORAL at 03:11

## 2021-06-12 RX ADMIN — ACETAMINOPHEN 650 MG: 325 TABLET ORAL at 09:11

## 2021-06-12 RX ADMIN — LISINOPRIL 30 MG: 10 TABLET ORAL at 11:15

## 2021-06-12 RX ADMIN — CEFAZOLIN SODIUM 2 G: 2 INJECTION, SOLUTION INTRAVENOUS at 01:32

## 2021-06-12 RX ADMIN — IBUPROFEN 600 MG: 600 TABLET, FILM COATED ORAL at 05:34

## 2021-06-12 NOTE — PROGRESS NOTES
[facility]     Progress Note    Patient Name: Holly Apple  : 1970  MRN: [id number]  Primary Care Physician:  Samuel Frey MD  Date of admission: 2021    Subjective  feels well  Subjective     Procedure:  VAGINAL HYSTERECTOMY    Post-op Diagnosis:     Post-Op Diagnosis Codes:     * Uterine prolapse without vaginal wall prolapse [N81.4]     Post-op Day:  1 Day Post-Op      Objective   Objective     Vitals:   Temp:  [97.2 °F (36.2 °C)-98 °F (36.7 °C)] 97.9 °F (36.6 °C)  Heart Rate:  [48-92] 85  Resp:  [14-18] 18  BP: ()/(42-78) 105/64  Flow (L/min):  [2-3] 2    Physical Exam    Constitutional: Awake, alert   Neck: Supple, no thyromegaly, no lymphadenopathy, trachea midline   Respiratory: Clear to auscultation bilaterally, nonlabored respirations    Cardiovascular: RRR, no murmurs, rubs, or gallops, palpable pedal pulses bilaterally   Abdominal: Soft insert   Musculoskeletal: No bilateral ankle edema, no clubbing or cyanosis to extremities   Psychiatric: Appropriate affect, cooperative    Result Review    Result Review:  I have personally reviewed the results from the time of this admission to 2021 09:13 EDT and agree with these findings:  []  Laboratory  []  Microbiology  []  Radiology  []  EKG/Telemetry   []  Cardiology/Vascular   []  Pathology  []  Old records  []  Other:  Most notable findings include: Stable    Assessment/Plan   Assessment / Plan     Brief Patient Summary:  Holly Apple is a 50 y.o. female who recently had vaginal hysterectomy    Active Hospital Problems:  Active Hospital Problems    Diagnosis    • **Uterine prolapse without vaginal wall prolapse    • Bartholin gland cyst        Plan:   Discharge home with a wick    DVT prophylaxis:  Mechanical DVT prophylaxis orders are present.    Disposition:  I expect patient to be discharged today.    Electronically signed by Samuel Narayanan MD, 21, 9:13 AM EDT.

## 2021-06-12 NOTE — PLAN OF CARE
Goal Outcome Evaluation:  Plan of Care Reviewed With: patient        Progress: improving   Pt up and ambulating with staff in tiwari up to nurses station and back to room. 1 person assist to bathroom throughout the shift. No prn meds given for pain. Tolerating po without difficulty.

## 2021-06-12 NOTE — DISCHARGE SUMMARY
[facility]               GYN DISCHARGE SUMMARY    Patient Name: Holly Apple  : 1970  MRN: [id number]    Date of Admission: 2021  Date of Discharge:  2021  Primary Care Physician: Samuel Frey MD    Consults     No orders found for last 30 day(s).          Presenting Problem:   Uterine prolapse without vaginal wall prolapse [N81.4]    Active and Resolved Hospital Problems:  Active Hospital Problems   No active problems to display.      Resolved Hospital Problems    Diagnosis POA   • **Uterine prolapse without vaginal wall prolapse [N81.4] Yes   • Bartholin gland cyst [N75.0] Yes         Hospital Course     Hospital Course:  Holly Apple is a 50 y.o. female status post vaginal hysterectomy and Bartholin cyst removal by Dr. Fox    Procedure:  VAGINAL HYSTERECTOMY    Post-op Diagnosis:     Post-Op Diagnosis Codes:     * Uterine prolapse without vaginal wall prolapse [N81.4]    Day of Discharge     Vital Signs:  Temp:  [97.2 °F (36.2 °C)-98 °F (36.7 °C)] 97.9 °F (36.6 °C)  Heart Rate:  [48-92] 85  Resp:  [14-18] 18  BP: ()/(42-78) 105/64  Flow (L/min):  [2-3] 2    Pertinent  and/or Most Recent Results     LAB RESULTS:      Lab 21  0427   WBC 11.77*   HEMOGLOBIN 11.1*   HEMATOCRIT 34.0   PLATELETS 264   NEUTROS ABS 9.30*   IMMATURE GRANS (ABS) 0.05   LYMPHS ABS 1.77   MONOS ABS 0.64   EOS ABS 0.00   MCV 76.9*                             Brief Urine Lab Results  (Last result in the past 365 days)      Color   Clarity   Blood   Leuk Est   Nitrite   Protein   CREAT   Urine HCG        21 0754               Negative         Microbiology Results (last 10 days)     Procedure Component Value - Date/Time    COVID-19,CEPHEID,COR/MATTHEW/PAD/[facility] IN-HOUSE(OR EMERGENT/ADD-ON),NP SWAB IN TRANSPORT MEDIA 3-4 HR TAT, RT-PCR - Swab, Nasopharynx [[id number]]  (Normal) Collected: 21 2105    Lab Status: Final result Specimen: Swab from Nasopharynx Updated:  06/08/21 0419     COVID19 Not Detected    Narrative:      Fact sheet for providers: https://www.fda.gov/media/219858/download     Fact sheet for patients: https://www.fda.gov/media/629947/download  Fact sheet for providers: https://www.fda.gov/media/646523/download     Fact sheet for patients: https://www.fda.gov/media/689009/download                           Labs Pending at Discharge:  Pending Labs     Order Current Status    Tissue Pathology Exam In process            Discharge Details        Discharge Medications      New Medications      Instructions Start Date   ibuprofen 800 MG tablet  Commonly known as: ADVIL,MOTRIN   800 mg, Oral, Every 8 Hours PRN      oxyCODONE-acetaminophen 5-325 MG per tablet  Commonly known as: Percocet   1 tablet, Oral, Every 6 Hours PRN         Continue These Medications      Instructions Start Date   fexofenadine 180 MG tablet  Commonly known as: ALLEGRA   180 mg, Oral, Nightly      lisinopril 30 MG tablet  Commonly known as: PRINIVIL,ZESTRIL   30 mg, Oral, Daily             Allergies   Allergen Reactions   • Diethyl Toluamide (Deet) Anaphylaxis   • Diflucan [Fluconazole] Swelling   • Wasp Venom Anaphylaxis   • Eggs Or Egg-Derived Products GI Intolerance   • Measles Virus Vaccine Other (See Comments)     DEVELOPED FEVER AS A CHILD   • Peanut-Containing Drug Products Unknown - High Severity     STATES CAUSES THROAT TINGLING         Discharge Disposition:  Home or Self Care, condition good    Diet: Regular        Discharge Activity:         Future Appointments   Date Time Provider Department Center   7/13/2021 10:00 AM [facility] [place] [facility] [place] [facility]   7/13/2021 12:15 PM [facility] [place] E[facility]/VAS 1 MG [facility] [place] [facility]   7/13/2021  2:15 PM [facility] [place] [facility] [place] [facility]   9/16/2021  5:00 PM [facility] [place] 1  [facility]             Electronically signed by Samuel Narayanan MD, 06/12/21, 9:25 AM EDT.

## 2021-06-14 LAB
CYTO UR: NORMAL
LAB AP CASE REPORT: NORMAL
LAB AP CLINICAL INFORMATION: NORMAL
PATH REPORT.FINAL DX SPEC: NORMAL
PATH REPORT.GROSS SPEC: NORMAL

## 2021-06-15 LAB — QT INTERVAL: 393 MS

## 2021-07-15 VITALS
WEIGHT: 200 LBS | BODY MASS INDEX: 34.15 KG/M2 | HEIGHT: 64 IN | SYSTOLIC BLOOD PRESSURE: 108 MMHG | DIASTOLIC BLOOD PRESSURE: 76 MMHG | HEART RATE: 94 BPM

## 2021-09-16 ENCOUNTER — HOSPITAL ENCOUNTER (OUTPATIENT)
Dept: MAMMOGRAPHY | Facility: HOSPITAL | Age: 51
Discharge: HOME | End: 2021-09-16
Admitting: OBSTETRICS & GYNECOLOGY

## 2021-09-16 DIAGNOSIS — Z12.39 SCREENING BREAST EXAMINATION: ICD-10-CM

## 2021-09-16 PROCEDURE — 77067 SCR MAMMO BI INCL CAD: CPT

## 2021-09-16 PROCEDURE — 77063 BREAST TOMOSYNTHESIS BI: CPT

## 2022-07-26 PROBLEM — F17.211 CIGARETTE NICOTINE DEPENDENCE IN REMISSION: Status: ACTIVE | Noted: 2022-07-26

## 2022-07-26 PROBLEM — J30.9 ALLERGIC RHINITIS DUE TO ALLERGEN: Status: ACTIVE | Noted: 2022-07-26

## 2022-07-26 NOTE — PATIENT INSTRUCTIONS
Cooking With Less Salt  Cooking with less salt is one way to reduce the amount of sodium you get from food. Sodium is one of the elements that make up salt. It is found naturally in foods and is also added to certain foods. Depending on your condition and overall health, your health care provider or dietitian may recommend that you reduce your sodium intake. Most people should have less than 2,300 milligrams (mg) of sodium each day. If you have high blood pressure (hypertension), you may need to limit your sodium to 1,500 mg each day. Follow the tips below to help reduce your sodium intake.  What are tips for eating less sodium?  Reading food labels       Check the food label before buying or using packaged ingredients. Always check the label for the serving size and sodium content.  Look for products with no more than 140 mg of sodium in one serving.  Check the % Daily Value column to see what percent of the daily recommended amount of sodium is provided in one serving of the product. Foods with 5% or less in this column are considered low in sodium. Foods with 20% or higher are considered high in sodium.  Do not choose foods with salt as one of the first three ingredients on the ingredients list. If salt is one of the first three ingredients, it usually means the item is high in sodium.     Shopping  Buy sodium-free or low-sodium products. Look for the following words on food labels:  Low-sodium.  Sodium-free.  Reduced-sodium.  No salt added.  Unsalted.  Always check the sodium content even if foods are labeled as low-sodium or no salt added.  Buy fresh foods.  Cooking  Use herbs, seasonings without salt, and spices as substitutes for salt.  Use sodium-free baking soda when baking.  Ephraim, braise, or roast foods to add flavor with less salt.  Avoid adding salt to pasta, rice, or hot cereals.  Drain and rinse canned vegetables, beans, and meat before use.  Avoid adding salt when cooking sweets and desserts.  Cook  "with low-sodium ingredients.  What foods are high in sodium?  Vegetables  Regular canned vegetables (not low-sodium or reduced-sodium). Sauerkraut, pickled vegetables, and relishes. Olives. French fries. Onion rings. Regular canned tomato sauce and paste. Regular tomato and vegetable juice. Frozen vegetables in sauces.  Grains  Instant hot cereals. Bread stuffing, pancake, and biscuit mixes. Croutons. Seasoned rice or pasta mixes. Noodle soup cups. Boxed or frozen macaroni and cheese. Regular salted crackers. Self-rising flour. Rolls. Bagels. Flour tortillas and wraps.  Meats and other proteins  Meat or fish that is salted, canned, smoked, cured, spiced, or pickled. This includes mancia, ham, sausages, hot dogs, corned beef, chipped beef, meat loaves, salt pork, jerky, pickled herring, anchovies, regular canned tuna, and sardines. Salted nuts.  Dairy  Processed cheese and cheese spreads. Cheese curds. Blue cheese. Feta cheese. String cheese. Regular cottage cheese. Buttermilk. Canned milk.  The items listed above may not be a complete list of foods high in sodium. Actual amounts of sodium may be different depending on processing. Contact a dietitian for more information.  What foods are low in sodium?  Fruits  Fresh, frozen, or canned fruit with no sauce added. Fruit juice.  Vegetables  Fresh or frozen vegetables with no sauce added. \"No salt added\" canned vegetables. \"No salt added\" tomato sauce and paste. Low-sodium or reduced-sodium tomato and vegetable juice.  Grains  Noodles, pasta, quinoa, rice. Shredded or puffed wheat or puffed rice. Regular or quick oats (not instant). Low-sodium crackers. Low-sodium bread. Whole-grain bread and whole-grain pasta. Unsalted popcorn.  Meats and other proteins  Fresh or frozen whole meats, poultry (not injected with sodium), and fish with no sauce added. Unsalted nuts. Dried peas, beans, and lentils without added salt. Unsalted canned beans. Eggs. Unsalted nut butters. " Low-sodium canned tuna or chicken.  Dairy  Milk. Soy milk. Yogurt. Low-sodium cheeses, such as Swiss, Medina Samson, mozzarella, and ricotta. Sherbet or ice cream (keep to ½ cup per serving). Cream cheese.  Fats and oils  Unsalted butter or margarine.  Other foods  Homemade pudding. Sodium-free baking soda and baking powder. Herbs and spices. Low-sodium seasoning mixes.  Beverages  Coffee and tea. Carbonated beverages.  The items listed above may not be a complete list of foods low in sodium. Actual amounts of sodium may be different depending on processing. Contact a dietitian for more information.  What are some salt alternatives when cooking?  The following are herbs, seasonings, and spices that can be used instead of salt to flavor your food. Herbs should be fresh or dried. Do not choose packaged mixes. Next to the name of the herb, spice, or seasoning are some examples of foods you can pair it with.  Herbs  Bay leaves - Soups, meat and vegetable dishes, and spaghetti sauce.  Basil - Italian dishes, soups, pasta, and fish dishes.  Cilantro - Meat, poultry, and vegetable dishes.  Chili powder - Marinades and Mexican dishes.  Chives - Salad dressings and potato dishes.  Cumin - Mexican dishes, couscous, and meat dishes.  Dill - Fish dishes, sauces, and salads.  Fennel - Meat and vegetable dishes, breads, and cookies.  Garlic (do not use garlic salt) - Italian dishes, meat dishes, salad dressings, and sauces.  Marjoram - Soups, potato dishes, and meat dishes.  Oregano - Pizza and spaghetti sauce.  Parsley - Salads, soups, pasta, and meat dishes.  Ana - Italian dishes, salad dressings, soups, and red meats.  Saffron - Fish dishes, pasta, and some poultry dishes.  Eddi - Stuffings and sauces.  Tarragon - Fish and poultry dishes.  Thyme - Stuffing, meat, and fish dishes.  Seasonings  Lemon juice - Fish dishes, poultry dishes, vegetables, and salads.  Vinegar - Salad dressings, vegetables, and fish  "dishes.  Spices  Cinnamon - Sweet dishes, such as cakes, cookies, and puddings.  Cloves - Gingerbread, puddings, and marinades for meats.  Varela - Vegetable dishes, fish and poultry dishes, and stir-croft dishes.  Yolanda - Vegetable dishes, fish dishes, and stir-croft dishes.  Nutmeg - Pasta, vegetables, poultry, fish dishes, and custard.  Summary  Cooking with less salt is one way to reduce the amount of sodium that you get from food.  Buy sodium-free or low-sodium products.  Check the food label before using or buying packaged ingredients.  Use herbs, seasonings without salt, and spices as substitutes for salt in foods.  This information is not intended to replace advice given to you by your health care provider. Make sure you discuss any questions you have with your health care provider.  Document Revised: 12/09/2020 Document Reviewed: 12/09/2020  [employer] Patient Education © 2021 [employer]      https://www.nhlbi.nih.gov/files/docs/public/heart/dash_brief.pdf\">   DASH Eating Plan  DASH stands for Dietary Approaches to Stop Hypertension. The DASH eating plan is a healthy eating plan that has been shown to:  Reduce high blood pressure (hypertension).  Reduce your risk for type 2 diabetes, heart disease, and stroke.  Help with weight loss.  What are tips for following this plan?  Reading food labels  Check food labels for the amount of salt (sodium) per serving. Choose foods with less than 5 percent of the Daily Value of sodium. Generally, foods with less than 300 milligrams (mg) of sodium per serving fit into this eating plan.  To find whole grains, look for the word \"whole\" as the first word in the ingredient list.  Shopping  Buy products labeled as \"low-sodium\" or \"no salt added.\"  Buy fresh foods. Avoid canned foods and pre-made or frozen meals.  Cooking  Avoid adding salt when cooking. Use salt-free seasonings or herbs instead of table salt or sea salt. Check with your health care provider or pharmacist before " using salt substitutes.  Do not croft foods. Cook foods using healthy methods such as baking, boiling, grilling, roasting, and broiling instead.  Cook with heart-healthy oils, such as olive, canola, avocado, soybean, or sunflower oil.  Meal planning       Eat a balanced diet that includes:  4 or more servings of fruits and 4 or more servings of vegetables each day. Try to fill one-half of your plate with fruits and vegetables.  6-8 servings of whole grains each day.  Less than 6 oz (170 g) of lean meat, poultry, or fish each day. A 3-oz (85-g) serving of meat is about the same size as a deck of cards. One egg equals 1 oz (28 g).  2-3 servings of low-fat dairy each day. One serving is 1 cup (237 mL).  1 serving of nuts, seeds, or beans 5 times each week.  2-3 servings of heart-healthy fats. Healthy fats called omega-3 fatty acids are found in foods such as walnuts, flaxseeds, fortified milks, and eggs. These fats are also found in cold-water fish, such as sardines, salmon, and mackerel.  Limit how much you eat of:  Canned or prepackaged foods.  Food that is high in trans fat, such as some fried foods.  Food that is high in saturated fat, such as fatty meat.  Desserts and other sweets, sugary drinks, and other foods with added sugar.  Full-fat dairy products.  Do not salt foods before eating.  Do not eat more than 4 egg yolks a week.  Try to eat at least 2 vegetarian meals a week.  Eat more home-cooked food and less restaurant, buffet, and fast food.     Lifestyle  When eating at a restaurant, ask that your food be prepared with less salt or no salt, if possible.  If you drink alcohol:  Limit how much you use to:  0-1 drink a day for women who are not pregnant.  0-2 drinks a day for men.  Be aware of how much alcohol is in your drink. In the [place] one drink equals one 12 oz bottle of beer (355 mL), one 5 oz glass of wine (148 mL), or one 1½ oz glass of hard liquor (44 mL).  General information  Avoid eating more than  2,300 mg of salt a day. If you have hypertension, you may need to reduce your sodium intake to 1,500 mg a day.  Work with your health care provider to maintain a healthy body weight or to lose weight. Ask what an ideal weight is for you.  Get at least 30 minutes of exercise that causes your heart to beat faster (aerobic exercise) most days of the week. Activities may include walking, swimming, or biking.  Work with your health care provider or dietitian to adjust your eating plan to your individual calorie needs.  What foods should I eat?  Fruits  All fresh, dried, or frozen fruit. Canned fruit in natural juice (without added sugar).  Vegetables  Fresh or frozen vegetables (raw, steamed, roasted, or grilled). Low-sodium or reduced-sodium tomato and vegetable juice. Low-sodium or reduced-sodium tomato sauce and tomato paste. Low-sodium or reduced-sodium canned vegetables.  Grains  Whole-grain or whole-wheat bread. Whole-grain or whole-wheat pasta. Brown rice. Oatmeal. Quinoa. Bulgur. Whole-grain and low-sodium cereals. Rosey bread. Low-fat, low-sodium crackers. Whole-wheat flour tortillas.  Meats and other proteins  Skinless chicken or turkey. Ground chicken or turkey. Pork with fat trimmed off. Fish and seafood. Egg whites. Dried beans, peas, or lentils. Unsalted nuts, nut butters, and seeds. Unsalted canned beans. Lean cuts of beef with fat trimmed off. Low-sodium, lean precooked or cured meat, such as sausages or meat loaves.  Dairy  Low-fat (1%) or fat-free (skim) milk. Reduced-fat, low-fat, or fat-free cheeses. Nonfat, low-sodium ricotta or cottage cheese. Low-fat or nonfat yogurt. Low-fat, low-sodium cheese.  Fats and oils  Soft margarine without trans fats. Vegetable oil. Reduced-fat, low-fat, or light mayonnaise and salad dressings (reduced-sodium). Canola, safflower, olive, avocado, soybean, and sunflower oils. Avocado.  Seasonings and condiments  Herbs. Spices. Seasoning mixes without salt.  Other  foods  Unsalted popcorn and pretzels. Fat-free sweets.  The items listed above may not be a complete list of foods and beverages you can eat. Contact a dietitian for more information.  What foods should I avoid?  Fruits  Canned fruit in a light or heavy syrup. Fried fruit. Fruit in cream or butter sauce.  Vegetables  Creamed or fried vegetables. Vegetables in a cheese sauce. Regular canned vegetables (not low-sodium or reduced-sodium). Regular canned tomato sauce and paste (not low-sodium or reduced-sodium). Regular tomato and vegetable juice (not low-sodium or reduced-sodium). Pickles. Olives.  Grains  Baked goods made with fat, such as croissants, muffins, or some breads. Dry pasta or rice meal packs.  Meats and other proteins  Fatty cuts of meat. Ribs. Fried meat. Acosta. Bologna, salami, and other precooked or cured meats, such as sausages or meat loaves. Fat from the back of a pig (fatback). Bratwurst. Salted nuts and seeds. Canned beans with added salt. Canned or smoked fish. Whole eggs or egg yolks. Chicken or turkey with skin.  Dairy  Whole or 2% milk, cream, and half-and-half. Whole or full-fat cream cheese. Whole-fat or sweetened yogurt. Full-fat cheese. Nondairy creamers. Whipped toppings. Processed cheese and cheese spreads.  Fats and oils  Butter. Stick margarine. Lard. Shortening. Ghee. Acosta fat. Tropical oils, such as coconut, palm kernel, or palm oil.  Seasonings and condiments  Onion salt, garlic salt, seasoned salt, table salt, and sea salt. Worcestershire sauce. Tartar sauce. Barbecue sauce. Teriyaki sauce. Soy sauce, including reduced-sodium. Steak sauce. Canned and packaged gravies. Fish sauce. Oyster sauce. Cocktail sauce. Store-bought horseradish. Ketchup. Mustard. Meat flavorings and tenderizers. Bouillon cubes. Hot sauces. Pre-made or packaged marinades. Pre-made or packaged taco seasonings. Relishes. Regular salad dressings.  Other foods  Salted popcorn and pretzels.  The items listed above  may not be a complete list of foods and beverages you should avoid. Contact a dietitian for more information.  Where to find more information  National Heart, Lung, and [facility] [place]: www.nhlbi.nih.gov  American Heart Association: www.heart.org  Academy of Nutrition and Dietetics: www.eatright.org  National Kidney Foundation: www.kidney.org  Summary  The DASH eating plan is a healthy eating plan that has been shown to reduce high blood pressure (hypertension). It may also reduce your risk for type 2 diabetes, heart disease, and stroke.  When on the DASH eating plan, aim to eat more fresh fruits and vegetables, whole grains, lean proteins, low-fat dairy, and heart-healthy fats.  With the DASH eating plan, you should limit salt (sodium) intake to 2,300 mg a day. If you have hypertension, you may need to reduce your sodium intake to 1,500 mg a day.  Work with your health care provider or dietitian to adjust your eating plan to your individual calorie needs.  This information is not intended to replace advice given to you by your health care provider. Make sure you discuss any questions you have with your health care provider.  Document Revised: 11/20/2020 Document Reviewed: 11/20/2020  [employer] Patient Education © 2021 [employer].       Heart-Healthy Eating Plan  Heart-healthy meal planning includes:  Eating less unhealthy fats.  Eating more healthy fats.  Making other changes in your diet.  Talk with your doctor or a diet specialist (dietitian) to create an eating plan that is right for you.  What is my plan?  Your doctor may recommend an eating plan that includes:  Total fat: ______% or less of total calories a day.  Saturated fat: ______% or less of total calories a day.  Cholesterol: less than _________mg a day.  What are tips for following this plan?  Cooking  Avoid frying your food. Try to bake, boil, grill, or broil it instead. You can also reduce fat by:  Removing the skin from poultry.  Removing all  visible fats from meats.  Steaming vegetables in water or broth.  Meal planning       At meals, divide your plate into four equal parts:  Fill one-half of your plate with vegetables and green salads.  Fill one-fourth of your plate with whole grains.  Fill one-fourth of your plate with lean protein foods.  Eat 4-5 servings of vegetables per day. A serving of vegetables is:  1 cup of raw or cooked vegetables.  2 cups of raw leafy greens.  Eat 4-5 servings of fruit per day. A serving of fruit is:  1 medium whole fruit.  ¼ cup of dried fruit.  ½ cup of fresh, frozen, or canned fruit.  ½ cup of 100% fruit juice.  Eat more foods that have soluble fiber. These are apples, broccoli, carrots, beans, peas, and barley. Try to get 20-30 g of fiber per day.  Eat 4-5 servings of nuts, legumes, and seeds per week:  1 serving of dried beans or legumes equals ½ cup after being cooked.  1 serving of nuts is ¼ cup.  1 serving of seeds equals 1 tablespoon.     General information  Eat more home-cooked food. Eat less restaurant, buffet, and fast food.  Limit or avoid alcohol.  Limit foods that are high in starch and sugar.  Avoid fried foods.  Lose weight if you are overweight.  Keep track of how much salt (sodium) you eat. This is important if you have high blood pressure. Ask your doctor to tell you more about this.  Try to add vegetarian meals each week.  Fats  Choose healthy fats. These include olive oil and canola oil, flaxseeds, walnuts, almonds, and seeds.  Eat more omega-3 fats. These include salmon, mackerel, sardines, tuna, flaxseed oil, and ground flaxseeds. Try to eat fish at least 2 times each week.  Check food labels. Avoid foods with trans fats or high amounts of saturated fat.  Limit saturated fats.  These are often found in animal products, such as meats, butter, and cream.  These are also found in plant foods, such as palm oil, palm kernel oil, and coconut oil.  Avoid foods with partially hydrogenated oils in them.  These have trans fats. Examples are stick margarine, some tub margarines, cookies, crackers, and other baked goods.  What foods can I eat?  Fruits  All fresh, canned (in natural juice), or frozen fruits.  Vegetables  Fresh or frozen vegetables (raw, steamed, roasted, or grilled). Green salads.  Grains  Most grains. Choose whole wheat and whole grains most of the time. Rice and pasta, including brown rice and pastas made with whole wheat.  Meats and other proteins  Lean, well-trimmed beef, veal, pork, and lamb. Chicken and turkey without skin. All fish and shellfish. Wild duck, rabbit, pheasant, and venison. Egg whites or low-cholesterol egg substitutes. Dried beans, peas, lentils, and tofu. Seeds and most nuts.  Dairy  Low-fat or nonfat cheeses, including ricotta and mozzarella. Skim or 1% milk that is liquid, powdered, or evaporated. Buttermilk that is made with low-fat milk. Nonfat or low-fat yogurt.  Fats and oils  Non-hydrogenated (trans-free) margarines. Vegetable oils, including soybean, sesame, sunflower, olive, peanut, safflower, corn, canola, and cottonseed. Salad dressings or mayonnaise made with a vegetable oil.  Beverages  Mineral water. Coffee and tea. Diet carbonated beverages.  Sweets and desserts  Sherbet, gelatin, and fruit ice. Small amounts of dark chocolate.  Limit all sweets and desserts.  Seasonings and condiments  All seasonings and condiments.  The items listed above may not be a complete list of foods and drinks you can eat. Contact a dietitian for more options.  What foods should I avoid?  Fruits  Canned fruit in heavy syrup. Fruit in cream or butter sauce. Fried fruit. Limit coconut.  Vegetables  Vegetables cooked in cheese, cream, or butter sauce. Fried vegetables.  Grains  Breads that are made with saturated or trans fats, oils, or whole milk. Croissants. Sweet rolls. Donuts. High-fat crackers, such as cheese crackers.  Meats and other proteins  Fatty meats, such as hot dogs, ribs,  sausage, mancia, rib-eye roast or steak. High-fat deli meats, such as salami and bologna. Caviar. Domestic duck and goose. Organ meats, such as liver.  Dairy  Cream, sour cream, cream cheese, and creamed cottage cheese. Whole-milk cheeses. Whole or 2% milk that is liquid, evaporated, or condensed. Whole buttermilk. Cream sauce or high-fat cheese sauce. Yogurt that is made from whole milk.  Fats and oils  Meat fat, or shortening. Cocoa butter, hydrogenated oils, palm oil, coconut oil, palm kernel oil. Solid fats and shortenings, including mancia fat, salt pork, lard, and butter. Nondairy cream substitutes. Salad dressings with cheese or sour cream.  Beverages  Regular sodas and juice drinks with added sugar.  Sweets and desserts  Frosting. Pudding. Cookies. Cakes. Pies. Milk chocolate or white chocolate. Buttered syrups. Full-fat ice cream or ice cream drinks.  The items listed above may not be a complete list of foods and drinks to avoid. Contact a dietitian for more information.  Summary  Heart-healthy meal planning includes eating less unhealthy fats, eating more healthy fats, and making other changes in your diet.  Eat a balanced diet. This includes fruits and vegetables, low-fat or nonfat dairy, lean protein, nuts and legumes, whole grains, and heart-healthy oils and fats.  This information is not intended to replace advice given to you by your health care provider. Make sure you discuss any questions you have with your health care provider.  Document Revised: 02/21/2019 Document Reviewed: 01/25/2019  [employer] Patient Education © 2021 [employer]       Mediterranean Diet  A Mediterranean diet refers to food and lifestyle choices that are based on the traditions of countries located on the [place]. This way of eating has been shown to help prevent certain conditions and improve outcomes for people who have chronic diseases, like kidney disease and heart disease.  What are tips for following this  plan?  Lifestyle  Cook and eat meals together with your family, when possible.  Drink enough fluid to keep your urine clear or pale yellow.  Be physically active every day. This includes:  Aerobic exercise like running or swimming.  Leisure activities like gardening, walking, or housework.  Get 7-8 hours of sleep each night.  If recommended by your health care provider, drink red wine in moderation. This means 1 glass a day for nonpregnant women and 2 glasses a day for men. A glass of wine equals 5 oz (150 mL).  Reading food labels       Check the serving size of packaged foods. For foods such as rice and pasta, the serving size refers to the amount of cooked product, not dry.  Check the total fat in packaged foods. Avoid foods that have saturated fat or trans fats.  Check the ingredients list for added sugars, such as corn syrup.     Shopping  At the grocery store, buy most of your food from the areas near the walls of the store. This includes:  Fresh fruits and vegetables (produce).  Grains, beans, nuts, and seeds. Some of these may be available in unpackaged forms or large amounts (in bulk).  Fresh seafood.  Poultry and eggs.  Low-fat dairy products.  Buy whole ingredients instead of prepackaged foods.  Buy fresh fruits and vegetables in-season from local farmers markets.  Buy frozen fruits and vegetables in resealable bags.  If you do not have access to quality fresh seafood, buy precooked frozen shrimp or canned fish, such as tuna, salmon, or sardines.  Buy small amounts of raw or cooked vegetables, salads, or olives from the deli or salad bar at your store.  Stock your pantry so you always have certain foods on hand, such as olive oil, canned tuna, canned tomatoes, rice, pasta, and beans.  Cooking  Cook foods with extra-virgin olive oil instead of using butter or other vegetable oils.  Have meat as a side dish, and have vegetables or grains as your main dish. This means having meat in small portions or adding  small amounts of meat to foods like pasta or stew.  Use beans or vegetables instead of meat in common dishes like chili or lasagna.  Lerna with different cooking methods. Try roasting or broiling vegetables instead of steaming or sautéeing them.  Add frozen vegetables to soups, stews, pasta, or rice.  Add nuts or seeds for added healthy fat at each meal. You can add these to yogurt, salads, or vegetable dishes.  Marinate fish or vegetables using olive oil, lemon juice, garlic, and fresh herbs.  Meal planning       Plan to eat 1 vegetarian meal one day each week. Try to work up to 2 vegetarian meals, if possible.  Eat seafood 2 or more times a week.  Have healthy snacks readily available, such as:  Vegetable sticks with hummus.  Greek yogurt.  Fruit and nut trail mix.  Eat balanced meals throughout the week. This includes:  Fruit: 2-3 servings a day  Vegetables: 4-5 servings a day  Low-fat dairy: 2 servings a day  Fish, poultry, or lean meat: 1 serving a day  Beans and legumes: 2 or more servings a week  Nuts and seeds: 1-2 servings a day  Whole grains: 6-8 servings a day  Extra-virgin olive oil: 3-4 servings a day  Limit red meat and sweets to only a few servings a month     What are my food choices?  Mediterranean diet  Recommended  Grains: Whole-grain pasta. Brown rice. Bulgar wheat. Polenta. Couscous. Whole-wheat bread. Oatmeal. Quinoa.  Vegetables: Artichokes. Beets. Broccoli. Cabbage. Carrots. Eggplant. Green beans. Chard. Kale. Spinach. Onions. Leeks. Peas. Squash. Tomatoes. Peppers. Radishes.  Fruits: Apples. Apricots. Avocado. Berries. Bananas. Cherries. Dates. Figs. Grapes. Pino. Melon. Oranges. Peaches. Plums. Pomegranate.  Meats and other protein foods: Beans. Almonds. Sunflower seeds. Pine nuts. Peanuts. Cod. [place]. Scallops. Shrimp. Tuna. Tilapia. Clams. Oysters. Eggs.  Dairy: Low-fat milk. Cheese. Greek yogurt.  Beverages: Water. Red wine. Herbal tea.  Fats and oils: Extra virgin olive oil.  Avocado oil. Grape seed oil.  Sweets and desserts: Greek yogurt with honey. Baked apples. Poached pears. Trail mix.  Seasoning and other foods: Basil. Cilantro. Coriander. Cumin. Mint. Parsley. Eddi. Rosemary. Tarragon. Garlic. Oregano. Thyme. Pepper. Balsalmic vinegar. Tahini. Hummus. Tomato sauce. Olives. Mushrooms.  Limit these  Grains: Prepackaged pasta or rice dishes. Prepackaged cereal with added sugar.  Vegetables: Deep fried potatoes (french fries).  Fruits: Fruit canned in syrup.  Meats and other protein foods: Beef. Pork. Lamb. Poultry with skin. Hot dogs. [place].  Dairy: Ice cream. Sour cream. Whole milk.  Beverages: Juice. Sugar-sweetened soft drinks. Beer. Liquor and spirits.  Fats and oils: Butter. Canola oil. Vegetable oil. Beef fat (tallow). Lard.  Sweets and desserts: Cookies. Cakes. Pies. Candy.  Seasoning and other foods: Mayonnaise. Premade sauces and marinades.  The items listed may not be a complete list. Talk with your dietitian about what dietary choices are right for you.  Summary  The Mediterranean diet includes both food and lifestyle choices.  Eat a variety of fresh fruits and vegetables, beans, nuts, seeds, and whole grains.  Limit the amount of red meat and sweets that you eat.  Talk with your health care provider about whether it is safe for you to drink red wine in moderation. This means 1 glass a day for nonpregnant women and 2 glasses a day for men. A glass of wine equals 5 oz (150 mL).  This information is not intended to replace advice given to you by your health care provider. Make sure you discuss any questions you have with your health care provider.  Document Revised: 08/17/2017 Document Reviewed: 08/10/2017  Elsevier Patient Education © 2020 Elsevier Inc.         Exercising to Stay Healthy  To become healthy and stay healthy, it is recommended that you do moderate-intensity and vigorous-intensity exercise. You can tell that you are exercising at a moderate intensity if your  heart starts beating faster and you start breathing faster but can still hold a conversation. You can tell that you are exercising at a vigorous intensity if you are breathing much harder and faster and cannot hold a conversation while exercising.  Exercising regularly is important. It has many health benefits, such as:  Improving overall fitness, flexibility, and endurance.  Increasing bone density.  Helping with weight control.  Decreasing body fat.  Increasing muscle strength.  Reducing stress and tension.  Improving overall health.  How often should I exercise?  Choose an activity that you enjoy, and set realistic goals. Your health care provider can help you make an activity plan that works for you.  Exercise regularly as told by your health care provider. This may include:  Doing strength training two times a week, such as:  Lifting weights.  Using resistance bands.  Push-ups.  Sit-ups.  Yoga.  Doing a certain intensity of exercise for a given amount of time. Choose from these options:  A total of 150 minutes of moderate-intensity exercise every week.  A total of 75 minutes of vigorous-intensity exercise every week.  A mix of moderate-intensity and vigorous-intensity exercise every week.  Children, pregnant women, people who have not exercised regularly, people who are overweight, and older adults may need to talk with a health care provider about what activities are safe to do. If you have a medical condition, be sure to talk with your health care provider before you start a new exercise program.  What are some exercise ideas?    Moderate-intensity exercise ideas include:  Walking 1 mile (1.6 km) in about 15 minutes.  Biking.  Hiking.  Golfing.  Dancing.  Water aerobics.  Vigorous-intensity exercise ideas include:  Walking 4.5 miles (7.2 km) or more in about 1 hour.  Jogging or running 5 miles (8 km) in about 1 hour.  Biking 10 miles (16.1 km) or more in about 1 hour.  Lap swimming.  Roller-skating or in-line  skating.  Cross-country skiing.  Vigorous competitive sports, such as football, basketball, and soccer.  Jumping rope.  Aerobic dancing.  What are some everyday activities that can help me to get exercise?  Yard work, such as:  Pushing a .  Raking and bagging leaves.  Washing your car.  Pushing a stroller.  Shoveling snow.  Gardening.  Washing windows or floors.  How can I be more active in my day-to-day activities?  Use stairs instead of an elevator.  Take a walk during your lunch break.  If you drive, park your car farther away from your work or school.  If you take public transportation, get off one stop early and walk the rest of the way.  Stand up or walk around during all of your indoor phone calls.  Get up, stretch, and walk around every 30 minutes throughout the day.  Enjoy exercise with a friend. Support to continue exercising will help you keep a regular routine of activity.  What guidelines can I follow while exercising?  Before you start a new exercise program, talk with your health care provider.  Do not exercise so much that you hurt yourself, feel dizzy, or get very short of breath.  Wear comfortable clothes and wear shoes with good support.  Drink plenty of water while you exercise to prevent dehydration or heat stroke.  Work out until your breathing and your heartbeat get faster.  Where to find more information  [place] Department of Health and Human Services: www.hhs.gov  Centers for Disease Control and Prevention (CDC): www.cdc.gov  Summary  Exercising regularly is important. It will improve your overall fitness, flexibility, and endurance.  Regular exercise also will improve your overall health. It can help you control your weight, reduce stress, and improve your bone density.  Do not exercise so much that you hurt yourself, feel dizzy, or get very short of breath.  Before you start a new exercise program, talk with your health care provider.  This information is not intended to replace  advice given to you by your health care provider. Make sure you discuss any questions you have with your health care provider.  Document Revised: 11/30/2018 Document Reviewed: 11/08/2018  Elsevier Patient Education © 2021 [employer]           Mindfulness-Based Stress Reduction  Mindfulness-based stress reduction (MBSR) is a program that helps people learn to practice mindfulness. Mindfulness is the practice of intentionally paying attention to the present moment. It can be learned and practiced through techniques such as education, breathing exercises, meditation, and yoga. MBSR includes several mindfulness techniques in one program.  MBSR works best when you understand the treatment, are willing to try new things, and can commit to spending time practicing what you learn. MBSR training may include learning about:  How your emotions, thoughts, and reactions affect your body.  New ways to respond to things that cause negative thoughts to start (triggers).  How to notice your thoughts and let go of them.  Practicing awareness of everyday things that you normally do without thinking.  The techniques and goals of different types of meditation.  What are the benefits of MBSR?  MBSR can have many benefits, which include helping you to:  Develop self-awareness. This refers to knowing and understanding yourself.  Learn skills and attitudes that help you to participate in your own health care.  Learn new ways to care for yourself.  Be more accepting about how things are, and let things go.  Be less judgmental and approach things with an open mind.  Be patient with yourself and trust yourself more.  MBSR has also been shown to:  Reduce negative emotions, such as depression and anxiety.  Improve memory and focus.  Change how you sense and approach pain.  Boost your body's ability to fight infections.  Help you connect better with other people.  Improve your sense of well-being.  Follow these instructions at home:       Find  a local in-person or online MBSR program.  Set aside some time regularly for mindfulness practice.  Find a mindfulness practice that works best for you. This may include one or more of the following:  Meditation. Meditation involves focusing your mind on a certain thought or activity.  Breathing awareness exercises. These help you to stay present by focusing on your breath.  Body scan. For this practice, you lie down and pay attention to each part of your body from head to toe. You can identify tension and soreness and intentionally relax parts of your body.  Yoga. Yoga involves stretching and breathing, and it can improve your ability to move and be flexible. It can also provide an experience of testing your body's limits, which can help you release stress.  Mindful eating. This way of eating involves focusing on the taste, texture, color, and smell of each bite of food. Because this slows down eating and helps you feel full sooner, it can be an important part of a weight-loss plan.  Find a podcast or recording that provides guidance for breathing awareness, body scan, or meditation exercises. You can listen to these any time when you have a free moment to rest without distractions.  Follow your treatment plan as told by your health care provider. This may include taking regular medicines and making changes to your diet or lifestyle as recommended.  How to practice mindfulness  To do a basic awareness exercise:  Find a comfortable place to sit.  Pay attention to the present moment. Observe your thoughts, feelings, and surroundings just as they are.  Avoid placing judgment on yourself, your feelings, or your surroundings. Make note of any judgment that comes up, and let it go.  Your mind may wander, and that is okay. Make note of when your thoughts drift, and return your attention to the present moment.  To do basic mindfulness meditation:  Find a comfortable place to sit. This may include a stable chair or a firm  floor cushion.  Sit upright with your back straight. Let your arms fall next to your side with your hands resting on your legs.  If sitting in a chair, rest your feet flat on the floor.  If sitting on a cushion, cross your legs in front of you.  Keep your head in a neutral position with your chin dropped slightly. Relax your jaw and rest the tip of your tongue on the roof of your mouth. Drop your gaze to the floor. You can close your eyes if you like.  Breathe normally and pay attention to your breath. Feel the air moving in and out of your nose. Feel your belly expanding and relaxing with each breath.  Your mind may wander, and that is okay. Make note of when your thoughts drift, and return your attention to your breath.  Avoid placing judgment on yourself, your feelings, or your surroundings. Make note of any judgment or feelings that come up, let them go, and bring your attention back to your breath.  When you are ready, lift your gaze or open your eyes. Pay attention to how your body feels after the meditation.  Where to find more information  You can find more information about MBSR from:  Your health care provider.  Community-based meditation centers or programs.  Programs offered near you.  Summary  Mindfulness-based stress reduction (MBSR) is a program that teaches you how to intentionally pay attention to the present moment. It is used with other treatments to help you cope better with daily stress, emotions, and pain.  MBSR focuses on developing self-awareness, which allows you to respond to life stress without judgment or negative emotions.  MBSR programs may involve learning different mindfulness practices, such as breathing exercises, meditation, yoga, body scan, or mindful eating. Find a mindfulness practice that works best for you, and set aside time for it on a regular basis.  This information is not intended to replace advice given to you by your health care provider. Make sure you discuss any  questions you have with your health care provider.  Document Revised: 02/22/2021 Document Reviewed: 04/26/2018  Elsevier Patient Education © 2021 Elsevier Inc.

## 2022-07-27 ENCOUNTER — OFFICE VISIT (OUTPATIENT)
Dept: INTERNAL MEDICINE | Facility: CLINIC | Age: 52
End: 2022-07-27

## 2022-07-27 VITALS
OXYGEN SATURATION: 96 % | WEIGHT: 207.2 LBS | TEMPERATURE: 98 F | BODY MASS INDEX: 35.37 KG/M2 | SYSTOLIC BLOOD PRESSURE: 136 MMHG | DIASTOLIC BLOOD PRESSURE: 83 MMHG | HEART RATE: 85 BPM | HEIGHT: 64 IN

## 2022-07-27 DIAGNOSIS — Z12.11 SCREENING FOR COLON CANCER: ICD-10-CM

## 2022-07-27 DIAGNOSIS — M54.2 CERVICALGIA: ICD-10-CM

## 2022-07-27 DIAGNOSIS — M25.512 CHRONIC LEFT SHOULDER PAIN: ICD-10-CM

## 2022-07-27 DIAGNOSIS — Z01.89 ENCOUNTER FOR LABORATORY TEST: ICD-10-CM

## 2022-07-27 DIAGNOSIS — Z11.59 NEED FOR HEPATITIS C SCREENING TEST: ICD-10-CM

## 2022-07-27 DIAGNOSIS — I10 ESSENTIAL HYPERTENSION: ICD-10-CM

## 2022-07-27 DIAGNOSIS — F17.211 CIGARETTE NICOTINE DEPENDENCE IN REMISSION: ICD-10-CM

## 2022-07-27 DIAGNOSIS — G89.29 CHRONIC LEFT SHOULDER PAIN: ICD-10-CM

## 2022-07-27 DIAGNOSIS — M54.6 CHRONIC BILATERAL THORACIC BACK PAIN: ICD-10-CM

## 2022-07-27 DIAGNOSIS — Z12.31 SCREENING MAMMOGRAM FOR BREAST CANCER: ICD-10-CM

## 2022-07-27 DIAGNOSIS — J30.89 NON-SEASONAL ALLERGIC RHINITIS DUE TO OTHER ALLERGIC TRIGGER: ICD-10-CM

## 2022-07-27 DIAGNOSIS — Z76.89 ESTABLISHING CARE WITH NEW DOCTOR, ENCOUNTER FOR: Primary | ICD-10-CM

## 2022-07-27 DIAGNOSIS — G89.29 CHRONIC BILATERAL THORACIC BACK PAIN: ICD-10-CM

## 2022-07-27 DIAGNOSIS — M50.30 DEGENERATIVE DISC DISEASE, CERVICAL: ICD-10-CM

## 2022-07-27 LAB
ALBUMIN SERPL-MCNC: 4.7 G/DL (ref 3.5–5.2)
ALBUMIN/GLOB SERPL: 1.9 G/DL
ALP SERPL-CCNC: 72 U/L (ref 39–117)
ALT SERPL W P-5'-P-CCNC: 38 U/L (ref 1–33)
ANION GAP SERPL CALCULATED.3IONS-SCNC: 12 MMOL/L (ref 5–15)
AST SERPL-CCNC: 24 U/L (ref 1–32)
BASOPHILS # BLD AUTO: 0.04 10*3/MM3 (ref 0–0.2)
BASOPHILS NFR BLD AUTO: 0.6 % (ref 0–1.5)
BILIRUB SERPL-MCNC: 0.2 MG/DL (ref 0–1.2)
BUN SERPL-MCNC: 14 MG/DL (ref 6–20)
BUN/CREAT SERPL: 15.6 (ref 7–25)
CALCIUM SPEC-SCNC: 9.7 MG/DL (ref 8.6–10.5)
CHLORIDE SERPL-SCNC: 101 MMOL/L (ref 98–107)
CHOLEST SERPL-MCNC: 209 MG/DL (ref 0–200)
CO2 SERPL-SCNC: 26 MMOL/L (ref 22–29)
CREAT SERPL-MCNC: 0.9 MG/DL (ref 0.57–1)
DEPRECATED RDW RBC AUTO: 36.6 FL (ref 37–54)
EGFRCR SERPLBLD CKD-EPI 2021: 76.6 ML/MIN/1.73
EOSINOPHIL # BLD AUTO: 0.1 10*3/MM3 (ref 0–0.4)
EOSINOPHIL NFR BLD AUTO: 1.5 % (ref 0.3–6.2)
ERYTHROCYTE [DISTWIDTH] IN BLOOD BY AUTOMATED COUNT: 13.2 % (ref 12.3–15.4)
GLOBULIN UR ELPH-MCNC: 2.5 GM/DL
GLUCOSE SERPL-MCNC: 86 MG/DL (ref 65–99)
HCT VFR BLD AUTO: 42 % (ref 34–46.6)
HCV AB SER DONR QL: NORMAL
HDLC SERPL-MCNC: 54 MG/DL (ref 40–60)
HGB BLD-MCNC: 13.8 G/DL (ref 12–15.9)
IMM GRANULOCYTES # BLD AUTO: 0.02 10*3/MM3 (ref 0–0.05)
IMM GRANULOCYTES NFR BLD AUTO: 0.3 % (ref 0–0.5)
LDLC SERPL CALC-MCNC: 128 MG/DL (ref 0–100)
LDLC/HDLC SERPL: 2.3 {RATIO}
LYMPHOCYTES # BLD AUTO: 2.77 10*3/MM3 (ref 0.7–3.1)
LYMPHOCYTES NFR BLD AUTO: 40.7 % (ref 19.6–45.3)
MCH RBC QN AUTO: 25.7 PG (ref 26.6–33)
MCHC RBC AUTO-ENTMCNC: 32.9 G/DL (ref 31.5–35.7)
MCV RBC AUTO: 78.2 FL (ref 79–97)
MONOCYTES # BLD AUTO: 0.51 10*3/MM3 (ref 0.1–0.9)
MONOCYTES NFR BLD AUTO: 7.5 % (ref 5–12)
NEUTROPHILS NFR BLD AUTO: 3.37 10*3/MM3 (ref 1.7–7)
NEUTROPHILS NFR BLD AUTO: 49.4 % (ref 42.7–76)
NRBC BLD AUTO-RTO: 0 /100 WBC (ref 0–0.2)
PLATELET # BLD AUTO: 315 10*3/MM3 (ref 140–450)
PMV BLD AUTO: 8.9 FL (ref 6–12)
POTASSIUM SERPL-SCNC: 4.3 MMOL/L (ref 3.5–5.2)
PROT SERPL-MCNC: 7.2 G/DL (ref 6–8.5)
RBC # BLD AUTO: 5.37 10*6/MM3 (ref 3.77–5.28)
SODIUM SERPL-SCNC: 139 MMOL/L (ref 136–145)
TRIGL SERPL-MCNC: 154 MG/DL (ref 0–150)
TSH SERPL DL<=0.05 MIU/L-ACNC: 3.08 UIU/ML (ref 0.27–4.2)
VLDLC SERPL-MCNC: 27 MG/DL (ref 5–40)
WBC NRBC COR # BLD: 6.81 10*3/MM3 (ref 3.4–10.8)

## 2022-07-27 PROCEDURE — 84466 ASSAY OF TRANSFERRIN: CPT | Performed by: STUDENT IN AN ORGANIZED HEALTH CARE EDUCATION/TRAINING PROGRAM

## 2022-07-27 PROCEDURE — 99204 OFFICE O/P NEW MOD 45 MIN: CPT | Performed by: STUDENT IN AN ORGANIZED HEALTH CARE EDUCATION/TRAINING PROGRAM

## 2022-07-27 PROCEDURE — 83540 ASSAY OF IRON: CPT | Performed by: STUDENT IN AN ORGANIZED HEALTH CARE EDUCATION/TRAINING PROGRAM

## 2022-07-27 PROCEDURE — 80061 LIPID PANEL: CPT | Performed by: STUDENT IN AN ORGANIZED HEALTH CARE EDUCATION/TRAINING PROGRAM

## 2022-07-27 PROCEDURE — 80050 GENERAL HEALTH PANEL: CPT | Performed by: STUDENT IN AN ORGANIZED HEALTH CARE EDUCATION/TRAINING PROGRAM

## 2022-07-27 PROCEDURE — 86803 HEPATITIS C AB TEST: CPT | Performed by: STUDENT IN AN ORGANIZED HEALTH CARE EDUCATION/TRAINING PROGRAM

## 2022-07-27 PROCEDURE — 82728 ASSAY OF FERRITIN: CPT | Performed by: STUDENT IN AN ORGANIZED HEALTH CARE EDUCATION/TRAINING PROGRAM

## 2022-07-27 RX ORDER — AMLODIPINE BESYLATE AND BENAZEPRIL HYDROCHLORIDE 10; 20 MG/1; MG/1
1 CAPSULE ORAL DAILY
Qty: 90 CAPSULE | Refills: 2 | Status: SHIPPED | OUTPATIENT
Start: 2022-07-27 | End: 2022-08-11

## 2022-07-27 RX ORDER — LISINOPRIL 30 MG/1
30 TABLET ORAL DAILY
Qty: 90 TABLET | Refills: 3 | Status: CANCELLED | OUTPATIENT
Start: 2022-07-27

## 2022-07-27 NOTE — PROGRESS NOTES
Chief Complaint  Establish Care (Referral to neuro surgery for DDD) and Hypertension    Subjective          Holly Ambika presents to [facility]  History of Present Illness    Previous PCPs: Dr. Powell, Dr. Samuel Frey, Dr. Aniyah Loco  Specialists: [facility], [facility] CARRILLO vaccine: none  Pneumonia: none  Shingles: none  Colon cancer screening: none  Mammogram: 5/22/21  Pap smear: s/p hysterectomy  DXA: none    Here with complaints of shoulder, neck and upper back pain.    These are chronic in nature.  Has reportedly had an MRI of shoulder and neck at [place] (though neck was greater than a year ago).  Per her report, MRI showed bulging discs and degenerative disc disease.  Reports has trialed PT.  Reports having pain extending into arms now.  Left shoulder is worse than right, and reports she is unable to extend left arm above head.    Saw a neurosurgeon in [place] previously (she doesn't remember name), but did not pursue surgery at this time as she wasn't comfortable with him.    No known history of MI, CVA or cancer diagnosis (had precancerous area removed from in front of right ear by derm)     PHQ-9 Depression Screening  Little interest or pleasure in doing things? 0-->not at all   Feeling down, depressed, or hopeless? 0-->not at all   Trouble falling or staying asleep, or sleeping too much?     Feeling tired or having little energy?     Poor appetite or overeating?     Feeling bad about yourself - or that you are a failure or have let yourself or your family down?     Trouble concentrating on things, such as reading the newspaper or watching television?     Moving or speaking so slowly that other people could have noticed? Or the opposite - being so fidgety or restless that you have been moving around a lot more than usual?     Thoughts that you would be better off dead, or of hurting yourself in some way?     PHQ-9 Total Score 0  "  If you checked off any problems, how difficult have these problems made it for you to do your work, take care of things at home, or get along with other people?             Current Outpatient Medications   Medication Instructions   • amLODIPine-benazepril (Lotrel) 10-20 MG per capsule 1 capsule, Oral, Daily   • fexofenadine (ALLEGRA) 180 mg, Oral, Nightly   • ibuprofen (ADVIL,MOTRIN) 800 mg, Oral, Every 8 Hours PRN       The following portions of the patient's history were reviewed and updated as appropriate: allergies, current medications, past family history, past medical history, past social history, past surgical history, and problem list.    Objective   Vital Signs:   /83   Pulse 85   Temp 98 °F (36.7 °C) (Temporal)   Ht 162.6 cm (64\")   Wt 94 kg (207 lb 3.2 oz)   SpO2 96%   BMI 35.57 kg/m²     Wt Readings from Last 3 Encounters:   07/27/22 94 kg (207 lb 3.2 oz)   05/09/22 94.3 kg (207 lb 12.8 oz)   02/14/22 94.5 kg (208 lb 6.4 oz)     BP Readings from Last 3 Encounters:   07/27/22 136/83   05/09/22 166/73   02/14/22 140/97     Physical Exam  Vitals reviewed.   Constitutional:       Appearance: Normal appearance.   Eyes:      Conjunctiva/sclera: Conjunctivae normal.   Cardiovascular:      Rate and Rhythm: Normal rate and regular rhythm.      Pulses: Normal pulses.      Heart sounds: Normal heart sounds. No murmur heard.  Pulmonary:      Effort: Pulmonary effort is normal.      Breath sounds: Normal breath sounds. No wheezing.   Abdominal:      General: Abdomen is flat.      Palpations: Abdomen is soft.      Tenderness: There is no abdominal tenderness.   Musculoskeletal:      Right lower leg: No edema.      Left lower leg: No edema.      Comments: Negative bilateral empty can test.  No pain with resisted internal or external rotation of bilateral upper extremities.  No restriction in terms of ROM right shoulder, and able to extend arm above head.  Per her report, she is unable to extend left arm " above head.   Skin:     General: Skin is warm and dry.   Neurological:      General: No focal deficit present.      Mental Status: She is alert. Mental status is at baseline.   Psychiatric:         Mood and Affect: Mood normal.         Behavior: Behavior normal.         Thought Content: Thought content normal.         Judgment: Judgment normal.        Result Review :   The following data was reviewed by: Mauri Vital MD on 07/27/2022:           Lab Results   Component Value Date    SARSANTIGEN Not Detected 05/09/2022    COVID19 Not Detected 06/07/2021    RAPFLUA Negative 05/09/2022    RAPFLUB Negative 05/09/2022       Procedures        Assessment and Plan    Diagnoses and all orders for this visit:    1. Establishing care with new doctor, encounter for (Primary)  -     Mammo Screening Digital Tomosynthesis Bilateral With CAD; Future  -     CBC & Differential  -     Comprehensive Metabolic Panel  -     Hepatitis C Antibody  -     TSH  -     Lipid Panel    2. Cigarette nicotine dependence in remission    3. Screening mammogram for breast cancer  -     Mammo Screening Digital Tomosynthesis Bilateral With CAD; Future    4. Need for hepatitis C screening test  -     Hepatitis C Antibody    5. Encounter for laboratory test  -     CBC & Differential  -     Comprehensive Metabolic Panel  -     Hepatitis C Antibody  -     TSH  -     Lipid Panel    6. Essential hypertension  -     Comprehensive Metabolic Panel  -     amLODIPine-benazepril (Lotrel) 10-20 MG per capsule; Take 1 capsule by mouth Daily.  Dispense: 90 capsule; Refill: 2    7. Non-seasonal allergic rhinitis due to other allergic trigger    8. Cervicalgia  -     MRI Cervical Spine Without Contrast; Future  -     Ambulatory Referral to Neurosurgery    9. Screening for colon cancer  -     Cologuard - Stool, Per Rectum; Future    10. Degenerative disc disease, cervical    11. Chronic left shoulder pain    12. Chronic bilateral thoracic back pain  -     MRI Thoracic  Spine Without Contrast; Future      HTN:  -above goal of < 130/80  -will stop lisinopril, and start lotrel    Misc:  -asked  to request records from previous providers as well as [facility] records    Medications Discontinued During This Encounter   Medication Reason   • fluticasone (FLONASE) 50 MCG/ACT nasal spray    • olopatadine (Patanol) 0.1 % ophthalmic solution    • oxyCODONE-acetaminophen (Percocet) 5-325 MG per tablet    • lisinopril (PRINIVIL,ZESTRIL) 30 MG tablet           Follow Up   Return in about 3 months (around 10/27/2022) for HTN, neck/shoulder pain.  Patient was given instructions and counseling regarding her condition or for health maintenance advice. Please see specific information pulled into the AVS if appropriate.       Mauri Vital MD  07/27/22  16:54 EDT

## 2022-07-28 ENCOUNTER — TELEPHONE (OUTPATIENT)
Dept: INTERNAL MEDICINE | Facility: CLINIC | Age: 52
End: 2022-07-28

## 2022-07-28 DIAGNOSIS — R71.8 MICROCYTOSIS: Primary | ICD-10-CM

## 2022-07-28 LAB
FERRITIN SERPL-MCNC: 112 NG/ML (ref 13–150)
IRON 24H UR-MRATE: 72 MCG/DL (ref 37–145)
IRON SATN MFR SERPL: 16 % (ref 20–50)
TIBC SERPL-MCNC: 447 MCG/DL (ref 298–536)
TRANSFERRIN SERPL-MCNC: 300 MG/DL (ref 200–360)

## 2022-07-28 NOTE — TELEPHONE ENCOUNTER
Lipids notable for increased total and LDL cholesterol, as well as increased triglycerides.  This isn't high enough to require medicines.  I would recommend a heart healthy diet, and we will monitor this for now.    CMP notable for mild elevation in ALT (a liver enzyme).    TSH is within normal limits.    Hepatitis C screen is negative.    CBC shows no evidence of anemia.  Microcytosis noted, which is suggestive of iron deficiency.  I have added iron studies onto the labs we collected yesterday.

## 2022-07-29 ENCOUNTER — TELEPHONE (OUTPATIENT)
Dept: INTERNAL MEDICINE | Facility: CLINIC | Age: 52
End: 2022-07-29

## 2022-07-29 NOTE — TELEPHONE ENCOUNTER
----- Message from Mauri Vital MD sent at 7/29/2022  6:56 AM EDT -----  Iron studies overall are reassuring, and do not suggest iron deficiency

## 2022-07-29 NOTE — TELEPHONE ENCOUNTER
ATTEMPTED TO CONTACT PT PER PROVIDER'S INSTRUCTIONS     NO ANSWER     LEFT VOICEMAIL WITH INSTRUCTIONS TO RETURN CALL TO OFFICE AT [phone]    OK FOR HUB TO ADVISE/READ   Mauri Vital MD   7/29/2022  6:56 AM EDT Back to Top      Iron studies overall are reassuring, and do not suggest iron deficiency.

## 2022-08-08 ENCOUNTER — OFFICE VISIT (OUTPATIENT)
Dept: INTERNAL MEDICINE | Facility: CLINIC | Age: 52
End: 2022-08-08

## 2022-08-08 ENCOUNTER — TELEPHONE (OUTPATIENT)
Dept: INTERNAL MEDICINE | Facility: CLINIC | Age: 52
End: 2022-08-08

## 2022-08-08 VITALS
TEMPERATURE: 98.1 F | DIASTOLIC BLOOD PRESSURE: 85 MMHG | HEIGHT: 64 IN | SYSTOLIC BLOOD PRESSURE: 128 MMHG | BODY MASS INDEX: 35.55 KG/M2 | HEART RATE: 100 BPM | OXYGEN SATURATION: 97 % | WEIGHT: 208.2 LBS

## 2022-08-08 DIAGNOSIS — J02.9 SORE THROAT: ICD-10-CM

## 2022-08-08 DIAGNOSIS — J34.89 SINUS PRESSURE: ICD-10-CM

## 2022-08-08 DIAGNOSIS — U07.1 COVID-19 VIRUS INFECTION: ICD-10-CM

## 2022-08-08 DIAGNOSIS — M54.9 UPPER BACK PAIN: Primary | ICD-10-CM

## 2022-08-08 LAB
EXPIRATION DATE: ABNORMAL
FLUAV AG UPPER RESP QL IA.RAPID: NOT DETECTED
FLUBV AG UPPER RESP QL IA.RAPID: NOT DETECTED
INTERNAL CONTROL: ABNORMAL
Lab: ABNORMAL
SARS-COV-2 AG UPPER RESP QL IA.RAPID: DETECTED

## 2022-08-08 PROCEDURE — 99213 OFFICE O/P EST LOW 20 MIN: CPT | Performed by: STUDENT IN AN ORGANIZED HEALTH CARE EDUCATION/TRAINING PROGRAM

## 2022-08-08 PROCEDURE — 87428 SARSCOV & INF VIR A&B AG IA: CPT | Performed by: STUDENT IN AN ORGANIZED HEALTH CARE EDUCATION/TRAINING PROGRAM

## 2022-08-08 RX ORDER — BROMPHENIRAMINE MALEATE, PSEUDOEPHEDRINE HYDROCHLORIDE, AND DEXTROMETHORPHAN HYDROBROMIDE 2; 30; 10 MG/5ML; MG/5ML; MG/5ML
10 SYRUP ORAL 4 TIMES DAILY PRN
Qty: 473 ML | Refills: 0 | Status: SHIPPED | OUTPATIENT
Start: 2022-08-08 | End: 2022-08-28

## 2022-08-08 NOTE — TELEPHONE ENCOUNTER
Caller: Holly Apple    Relationship: Self    Best call back number:[phone]    What was the call regarding: PT CALLED STATING SHE RECEIVED A LETTER IN THE MAIL REGARDING AN EMERGENCY REFERRAL AND IF SHE STILL NEEDS IT. SHE DOES NOT UNDERSTAND WHAT THIS MEANS OR WHAT REFERRAL IT IS FOR.    Do you require a callback: YES PLEASE CALL BACK TO DISCUSS

## 2022-08-08 NOTE — PROGRESS NOTES
"Chief Complaint  Sinus Problem (Sinuses are dry/Dryness in mouth/Hurts when breathing in) and Back Pain (Muscle spasms)    Subjective          Holly Apple presents to [facility]  History of Present Illness    Sinus problem for over over a week.  Feeling sinus pressure, throat is \"raw\".  No fever.  No vomiting or diarrhea.  No cough.    Using allegra.  Has a neti pot but hasn't used very often.    Having muscle spasms in the upper back.  August 25th is date for MRI.      Current Outpatient Medications   Medication Instructions   • amLODIPine-benazepril (Lotrel) 10-20 MG per capsule 1 capsule, Oral, Daily   • brompheniramine-pseudoephedrine-DM 30-2-10 MG/5ML syrup 10 mL, Oral, 4 Times Daily PRN   • fexofenadine (ALLEGRA) 180 mg, Oral, Nightly   • ibuprofen (ADVIL,MOTRIN) 800 mg, Oral, Every 8 Hours PRN       The following portions of the patient's history were reviewed and updated as appropriate: allergies, current medications, past family history, past medical history, past social history, past surgical history, and problem list.    Objective   Vital Signs:   /85   Pulse 100   Temp 98.1 °F (36.7 °C) (Temporal)   Ht 162.6 cm (64\")   Wt 94.4 kg (208 lb 3.2 oz)   SpO2 97%   BMI 35.74 kg/m²     Wt Readings from Last 3 Encounters:   08/08/22 94.4 kg (208 lb 3.2 oz)   07/27/22 94 kg (207 lb 3.2 oz)   05/09/22 94.3 kg (207 lb 12.8 oz)     BP Readings from Last 3 Encounters:   08/08/22 128/85   07/27/22 136/83   05/09/22 166/73     Physical Exam  Vitals reviewed.   Constitutional:       Appearance: Normal appearance.   HENT:      Head: Normocephalic and atraumatic.      Mouth/Throat:      Comments: Posterior oropharynx with cobblestone appearance  Eyes:      Conjunctiva/sclera: Conjunctivae normal.   Cardiovascular:      Rate and Rhythm: Regular rhythm. Tachycardia present.      Pulses: Normal pulses.      Heart sounds: Normal heart sounds.   Pulmonary:      " Effort: Pulmonary effort is normal.      Breath sounds: Normal breath sounds. No wheezing.   Abdominal:      General: Abdomen is flat.      Palpations: Abdomen is soft. There is no mass.      Tenderness: There is no abdominal tenderness.   Musculoskeletal:      Right lower leg: No edema.      Left lower leg: No edema.   Skin:     General: Skin is warm and dry.   Neurological:      General: No focal deficit present.      Mental Status: She is alert. Mental status is at baseline.   Psychiatric:         Mood and Affect: Mood normal.         Behavior: Behavior normal.         Thought Content: Thought content normal.         Judgment: Judgment normal.        Result Review :   The following data was reviewed by: Mauri Vital MD on 08/08/2022:  Common labs    Common Labsle 7/27/22 7/27/22 7/27/22    1447 1447 1447   Glucose  86    BUN  14    Creatinine  0.90    Sodium  139    Potassium  4.3    Chloride  101    Calcium  9.7    Albumin  4.70    Total Bilirubin  0.2    Alkaline Phosphatase  72    AST (SGOT)  24    ALT (SGPT)  38 (A)    WBC 6.81     Hemoglobin 13.8     Hematocrit 42.0     Platelets 315     Total Cholesterol   209 (A)   Triglycerides   154 (A)   HDL Cholesterol   54   LDL Cholesterol    128 (A)   (A) Abnormal value                   Lab Results   Component Value Date    SARSANTIGEN Detected (A) 08/08/2022    COVID19 Not Detected 06/07/2021    RAPFLUA Negative 05/09/2022    RAPFLUB Negative 05/09/2022    FLUAAG Not Detected 08/08/2022    FLUBAG Not Detected 08/08/2022       Procedures        Assessment and Plan    Diagnoses and all orders for this visit:    1. Upper back pain (Primary)    2. COVID-19 virus infection    3. Sinus pressure  -     POCT SARS-CoV-2 Antigen DEION    4. Sore throat    Other orders  -     brompheniramine-pseudoephedrine-DM 30-2-10 MG/5ML syrup; Take 10 mL by mouth 4 (Four) Times a Day As Needed for Congestion or Allergies.  Dispense: 473 mL; Refill: 0      -recommended 10 day  quarantine, which can be reduced to as little as 5 days if symptoms completely resolve (would need to wear a mask in public and around others for the remainder of the 10 days)  -10 days would begin with start of symptoms  -discussed using neti pot in addition to bromfed for nasal symptoms    There are no discontinued medications.       Follow Up   Return if symptoms worsen or fail to improve.  Patient was given instructions and counseling regarding her condition or for health maintenance advice. Please see specific information pulled into the AVS if appropriate.       Mauri Vital MD  08/08/22  17:28 EDT

## 2022-08-11 ENCOUNTER — TELEPHONE (OUTPATIENT)
Dept: INTERNAL MEDICINE | Facility: CLINIC | Age: 52
End: 2022-08-11

## 2022-08-11 DIAGNOSIS — I10 ESSENTIAL HYPERTENSION: Primary | ICD-10-CM

## 2022-08-11 RX ORDER — LOSARTAN POTASSIUM AND HYDROCHLOROTHIAZIDE 12.5; 5 MG/1; MG/1
1 TABLET ORAL DAILY
Qty: 90 TABLET | Refills: 2 | Status: SHIPPED | OUTPATIENT
Start: 2022-08-11 | End: 2022-08-31

## 2022-08-11 NOTE — TELEPHONE ENCOUNTER
That's fine.  I'm sending in a new blood pressure medicine to replace.  This is to replace her previous regimen.  She is to stop taking the lotrel (the amlodipine/benazepril combo).

## 2022-08-11 NOTE — TELEPHONE ENCOUNTER
PT(PATIENT) VERIFIED      amLODIPine-benazepril (Lotrel) 10-20 MG per capsule (2022)     PT(PATIENT) STATES MEDICATION IS NOT WORKING FOR HER     PT(PATIENT) REPORTS BILATERAL SWELLING OF HER FEET UP TO BELOW HER KNEE SINCE STARTING THE MEDICATION, WITH HER LEFT ANKLE IS WORSE THAN THE RIGHT     PT(PATIENT) REPORTS SHE IS SO SWOLLEN IT IS PAINFUL TO WALK     PT(PATIENT) STATES SHE DOESN'T HAVE A HISTORY OF SWELLING LIKE THIS     PROVIDER PLEASE ADVISE

## 2022-08-22 ENCOUNTER — TELEPHONE (OUTPATIENT)
Dept: INTERNAL MEDICINE | Facility: CLINIC | Age: 52
End: 2022-08-22

## 2022-08-22 DIAGNOSIS — G89.29 CHRONIC LEFT SHOULDER PAIN: ICD-10-CM

## 2022-08-22 DIAGNOSIS — M25.512 CHRONIC LEFT SHOULDER PAIN: ICD-10-CM

## 2022-08-22 DIAGNOSIS — M54.6 CHRONIC BILATERAL THORACIC BACK PAIN: Primary | ICD-10-CM

## 2022-08-22 DIAGNOSIS — G89.29 CHRONIC BILATERAL THORACIC BACK PAIN: Primary | ICD-10-CM

## 2022-08-22 DIAGNOSIS — M54.2 CERVICALGIA: ICD-10-CM

## 2022-08-22 DIAGNOSIS — M50.30 DEGENERATIVE DISC DISEASE, CERVICAL: ICD-10-CM

## 2022-08-22 NOTE — TELEPHONE ENCOUNTER
Caller: Holly Apple    Relationship: Self    Best call back number: [phone]    What test/procedure requested: MRI    When is it needed: AS SOON AS POSSIBLE    Where is the test/procedure going to be performed: [facility]     Additional information or concerns: INSURANCE IS STILL DECLINING AUTHORIZATION AND RECOMMENDING PHYSICAL THERAPY FIRST, BUT THEY ARE WILLING TO DO A PEER TO PEER WITH DR. SERNA THIS NEEDS TO BE DONE BY Friday. THE REFERENCE NUMBER IS [id number] AND THE NUMBER TO CALL [phone].  PATIENT STATED THE PAIN HAS GOTTEN UNBEARABLE AND IS PREVENTING HER FROM PERFORMING NORMAL EVERYDAY TASKS SHE WOULD LIKE TO SPEAK WITH DR. SERNA ABOUT HER CONDITION PRIOR TO THE PEER TO PEER

## 2022-08-22 NOTE — TELEPHONE ENCOUNTER
PROVIDER PLACED REFERRAL FOR PT     Referral to Physical Therapy for Chronic bilateral thoracic back pain; Chronic left shoulder pain; Cervicalgia; Degenerative disc disease, cervical (08/22/2022)

## 2022-08-23 NOTE — TELEPHONE ENCOUNTER
PATIENT CALLED TODAY STATING SHE DOES NOT WANT TO DO PHYSICAL THERAPY WITH [facility] AND WOULD LIKE A DIFFERENT REFERRAL. PLEASE CALL HER BACK AND ADVISE.

## 2022-08-24 NOTE — TELEPHONE ENCOUNTER
SPOKE WITH MS. ONTIVEROS-SHE REQUESTED TO BE SEEN AT [facility] [place]. REFERRAL UPDATED AND SENT BACK TO THE HUB FOR SCHEDULING AT [facility]. MANY THANKS

## 2022-08-25 ENCOUNTER — APPOINTMENT (OUTPATIENT)
Dept: MRI IMAGING | Facility: HOSPITAL | Age: 52
End: 2022-08-25

## 2022-08-31 ENCOUNTER — OFFICE VISIT (OUTPATIENT)
Dept: INTERNAL MEDICINE | Facility: CLINIC | Age: 52
End: 2022-08-31

## 2022-08-31 VITALS
SYSTOLIC BLOOD PRESSURE: 141 MMHG | HEART RATE: 80 BPM | DIASTOLIC BLOOD PRESSURE: 81 MMHG | HEIGHT: 64 IN | TEMPERATURE: 98.5 F | WEIGHT: 206 LBS | OXYGEN SATURATION: 97 % | BODY MASS INDEX: 35.17 KG/M2

## 2022-08-31 DIAGNOSIS — M79.602 LEFT ARM PAIN: ICD-10-CM

## 2022-08-31 DIAGNOSIS — R06.09 DYSPNEA ON EXERTION: ICD-10-CM

## 2022-08-31 DIAGNOSIS — J30.89 NON-SEASONAL ALLERGIC RHINITIS DUE TO OTHER ALLERGIC TRIGGER: ICD-10-CM

## 2022-08-31 DIAGNOSIS — J34.89 SINUS PRESSURE: Primary | ICD-10-CM

## 2022-08-31 DIAGNOSIS — Z86.16 PERSONAL HISTORY OF COVID-19: ICD-10-CM

## 2022-08-31 DIAGNOSIS — Z86.15 PERSONAL HISTORY OF LATENT TUBERCULOSIS INFECTION: ICD-10-CM

## 2022-08-31 DIAGNOSIS — G93.31 POSTVIRAL FATIGUE SYNDROME: ICD-10-CM

## 2022-08-31 DIAGNOSIS — I10 ESSENTIAL HYPERTENSION: ICD-10-CM

## 2022-08-31 LAB
ALBUMIN SERPL-MCNC: 4.5 G/DL (ref 3.5–5.2)
ALBUMIN/GLOB SERPL: 1.5 G/DL
ALP SERPL-CCNC: 73 U/L (ref 39–117)
ALT SERPL W P-5'-P-CCNC: 44 U/L (ref 1–33)
ANION GAP SERPL CALCULATED.3IONS-SCNC: 9.3 MMOL/L (ref 5–15)
AST SERPL-CCNC: 26 U/L (ref 1–32)
BILIRUB SERPL-MCNC: 0.3 MG/DL (ref 0–1.2)
BUN SERPL-MCNC: 13 MG/DL (ref 6–20)
BUN/CREAT SERPL: 14.4 (ref 7–25)
CALCIUM SPEC-SCNC: 9.5 MG/DL (ref 8.6–10.5)
CHLORIDE SERPL-SCNC: 98 MMOL/L (ref 98–107)
CO2 SERPL-SCNC: 28.7 MMOL/L (ref 22–29)
CREAT SERPL-MCNC: 0.9 MG/DL (ref 0.57–1)
EGFRCR SERPLBLD CKD-EPI 2021: 76.6 ML/MIN/1.73
GLOBULIN UR ELPH-MCNC: 3 GM/DL
GLUCOSE SERPL-MCNC: 113 MG/DL (ref 65–99)
HOLD SPECIMEN: NORMAL
POTASSIUM SERPL-SCNC: 3.6 MMOL/L (ref 3.5–5.2)
PROT SERPL-MCNC: 7.5 G/DL (ref 6–8.5)
SODIUM SERPL-SCNC: 136 MMOL/L (ref 136–145)

## 2022-08-31 PROCEDURE — 99214 OFFICE O/P EST MOD 30 MIN: CPT | Performed by: STUDENT IN AN ORGANIZED HEALTH CARE EDUCATION/TRAINING PROGRAM

## 2022-08-31 PROCEDURE — 80053 COMPREHEN METABOLIC PANEL: CPT | Performed by: STUDENT IN AN ORGANIZED HEALTH CARE EDUCATION/TRAINING PROGRAM

## 2022-08-31 PROCEDURE — 86480 TB TEST CELL IMMUN MEASURE: CPT | Performed by: STUDENT IN AN ORGANIZED HEALTH CARE EDUCATION/TRAINING PROGRAM

## 2022-08-31 PROCEDURE — 93000 ELECTROCARDIOGRAM COMPLETE: CPT | Performed by: STUDENT IN AN ORGANIZED HEALTH CARE EDUCATION/TRAINING PROGRAM

## 2022-08-31 RX ORDER — LOSARTAN POTASSIUM AND HYDROCHLOROTHIAZIDE 25; 100 MG/1; MG/1
1 TABLET ORAL DAILY
Qty: 90 TABLET | Refills: 2 | Status: SHIPPED | OUTPATIENT
Start: 2022-08-31 | End: 2023-01-09

## 2022-08-31 RX ORDER — FLUTICASONE PROPIONATE 50 MCG
2 SPRAY, SUSPENSION (ML) NASAL DAILY
Qty: 18.2 ML | Refills: 3 | Status: SHIPPED | OUTPATIENT
Start: 2022-08-31

## 2022-08-31 NOTE — PROGRESS NOTES
Chief Complaint  Hypertension and Palpitations (Left arm pain)    Subjective          Holly Apple presents to [facility]  History of Present Illness    Here with complaints of left arm pain and dyspnea the last few days.    She believes she likely had a heart attack 2-3 years ago. No ER visit at that time.    She denies chest pain at this time.  She reports she has been increasing her steps recently and now walking 5,000 steps a day (without chest pain).    She reports no triggers, exacerbating or alleviating factors for left arm pain.    She went to urgent care with right flank pain and fatigue on 8/28/22.  She reports to urgent care a history of untreated latent tuberculosis, and a 2-view chest x-ray was obtained (I have reviewed the images and report - calcified granulomatous disease noted).  She reports she had intolerance to isoniazid and never completed treatment.    She reports that her fatigue and right sided flank pain have both improved.    She reports significant life stress, and reports having a stalker that she has seen following her over the last 2 days.    She reports sinus pressure, which is also improved.  Has been using fenugreek.    Of note, she is not checking her blood pressure at home    Current Outpatient Medications   Medication Instructions   • EPINEPHrine (EPIPEN) 0.3 MG/0.3ML solution auto-injector injection 0.3 mg   • fexofenadine (ALLEGRA) 180 mg, Oral, Nightly   • fluticasone (Flonase) 50 MCG/ACT nasal spray 2 sprays, Nasal, Daily   • ibuprofen (ADVIL,MOTRIN) 800 mg, Oral, Every 8 Hours PRN   • losartan-hydrochlorothiazide (Hyzaar) 100-25 MG per tablet 1 tablet, Oral, Daily       The following portions of the patient's history were reviewed and updated as appropriate: allergies, current medications, past family history, past medical history, past social history, past surgical history, and problem list.    Objective   Vital Signs:   BP  "141/81   Pulse 80   Temp 98.5 °F (36.9 °C) (Temporal)   Ht 162.6 cm (64\")   Wt 93.4 kg (206 lb)   SpO2 97%   BMI 35.36 kg/m²     Wt Readings from Last 3 Encounters:   08/31/22 93.4 kg (206 lb)   08/28/22 92.6 kg (204 lb 3.2 oz)   08/08/22 94.4 kg (208 lb 3.2 oz)     BP Readings from Last 3 Encounters:   08/31/22 141/81   08/28/22 145/85   08/08/22 128/85     Physical Exam  Vitals reviewed.   Constitutional:       Appearance: Normal appearance.   HENT:      Head: Normocephalic and atraumatic.   Eyes:      Conjunctiva/sclera: Conjunctivae normal.   Cardiovascular:      Rate and Rhythm: Normal rate and regular rhythm.      Pulses: Normal pulses.      Heart sounds: Normal heart sounds. No murmur heard.  Pulmonary:      Effort: Pulmonary effort is normal.      Breath sounds: Normal breath sounds. No wheezing, rhonchi or rales.   Abdominal:      General: Abdomen is flat.      Palpations: Abdomen is soft.      Tenderness: There is no abdominal tenderness.   Musculoskeletal:      Right lower leg: No edema.      Left lower leg: No edema.   Skin:     General: Skin is warm and dry.   Neurological:      General: No focal deficit present.      Mental Status: She is alert.   Psychiatric:         Behavior: Behavior normal.         Thought Content: Thought content normal.         Judgment: Judgment normal.       Result Review :   The following data was reviewed by: Mauri Vital MD on 08/31/2022:  Common labs    Common Labsle 7/27/22 7/27/22 7/27/22    1447 1447 1447   Glucose  86    BUN  14    Creatinine  0.90    Sodium  139    Potassium  4.3    Chloride  101    Calcium  9.7    Albumin  4.70    Total Bilirubin  0.2    Alkaline Phosphatase  72    AST (SGOT)  24    ALT (SGPT)  38 (A)    WBC 6.81     Hemoglobin 13.8     Hematocrit 42.0     Platelets 315     Total Cholesterol   209 (A)   Triglycerides   154 (A)   HDL Cholesterol   54   LDL Cholesterol    128 (A)   (A) Abnormal value                   Lab Results   Component " Value Date    SARSANTIGEN Detected (A) 08/08/2022    COVID19 Not Detected 06/07/2021    RAPFLUA Negative 05/09/2022    RAPFLUB Negative 05/09/2022    FLUAAG Not Detected 08/08/2022    FLUBAG Not Detected 08/08/2022    BILIRUBINUR Negative 08/28/2022       Procedures     EKG today:  Sinus rhythm, rate 79  Normal axis  No evidence of hypertrophy  Q waves noted II, III and aVF    Unchanged compared to June 2021 EKG       Assessment and Plan    Diagnoses and all orders for this visit:    1. Sinus pressure (Primary)  Comments:  -will trial flonase in addition to allegra    2. Essential hypertension  -     ECG 12 Lead  -     losartan-hydrochlorothiazide (Hyzaar) 100-25 MG per tablet; Take 1 tablet by mouth Daily.  Dispense: 90 tablet; Refill: 2  -     Comprehensive Metabolic Panel    3. Personal history of COVID-19    4. Postviral fatigue syndrome    5. Left arm pain  Comments:  -left arm pain plus dyspnea noted  -EKG with Q waves in II, III and AVF  -have ordered stress test  Orders:  -     ECG 12 Lead  -     Stress test with myocardial perfusion; Future    6. Personal history of latent tuberculosis infection  Comments:  -will check quantiferon  -if positive, will refer to ID (had intolerance to isoniazid)  Orders:  -     QuantiFERON TB Gold  -     Extra Tubes    7. Dyspnea on exertion  -     Stress test with myocardial perfusion; Future    8. Non-seasonal allergic rhinitis due to other allergic trigger  -     fluticasone (Flonase) 50 MCG/ACT nasal spray; 2 sprays into the nostril(s) as directed by provider Daily.  Dispense: 18.2 mL; Refill: 3          Medications Discontinued During This Encounter   Medication Reason   • losartan-hydrochlorothiazide (Hyzaar) 50-12.5 MG per tablet         I spent 30 minutes caring for Holly on this date of service. This time includes time spent by me in the following activities:preparing for the visit, reviewing tests, obtaining and/or reviewing a separately obtained history, performing  a medically appropriate examination and/or evaluation , counseling and educating the patient/family/caregiver, ordering medications, tests, or procedures and documenting information in the medical record  Follow Up   Return if symptoms worsen or fail to improve.  Patient was given instructions and counseling regarding her condition or for health maintenance advice. Please see specific information pulled into the AVS if appropriate.       Mauri Vital MD  08/31/22  17:58 EDT             Scheduling Instructions (Optional): $50 Detail Level: Detailed Procedure To Be Performed At Next Visit: Electrodesiccation (Cosmetic)

## 2022-09-01 PROBLEM — M48.02 SPINAL STENOSIS OF CERVICAL REGION: Status: ACTIVE | Noted: 2022-09-01

## 2022-09-02 ENCOUNTER — TELEPHONE (OUTPATIENT)
Dept: INTERNAL MEDICINE | Facility: CLINIC | Age: 52
End: 2022-09-02

## 2022-09-02 LAB
GAMMA INTERFERON BACKGROUND BLD IA-ACNC: 0.01 IU/ML
M TB IFN-G BLD-IMP: NEGATIVE
M TB IFN-G CD4+ BCKGRND COR BLD-ACNC: 0.02 IU/ML
M TB IFN-G CD4+CD8+ BCKGRND COR BLD-ACNC: 0.01 IU/ML
MITOGEN IGNF BLD-ACNC: >10 IU/ML
QUANTIFERON INCUBATION: NORMAL
SERVICE CMNT-IMP: NORMAL

## 2022-09-02 NOTE — TELEPHONE ENCOUNTER
----- Message from Mauri Vital MD sent at 9/1/2022  8:04 AM EDT -----  CMP notable for mildly elevated ALT (similar to a month ago).  Blood sugar mildly elevated at 113.  I'm still waiting on quantiferon results.

## 2022-09-06 ENCOUNTER — TELEPHONE (OUTPATIENT)
Dept: INTERNAL MEDICINE | Facility: CLINIC | Age: 52
End: 2022-09-06

## 2022-09-06 NOTE — TELEPHONE ENCOUNTER
----- Message from Mauri Vital MD sent at 9/3/2022  9:19 AM EDT -----  Quantiferon is negative.  This means no evidence at this time of active or latent tuberculosis.

## 2022-09-06 NOTE — TELEPHONE ENCOUNTER
ATTEMPTED TO CONTACT PATIENT. LEFT MESSAGE WITH RESULTS, OK PER RELEASE.     HUB OK TO READ/ADVISE:  Quantiferon is negative.  This means no evidence at this time of active or latent tuberculosis.

## 2022-09-12 ENCOUNTER — TELEPHONE (OUTPATIENT)
Dept: INTERNAL MEDICINE | Facility: CLINIC | Age: 52
End: 2022-09-12

## 2022-09-15 ENCOUNTER — HOSPITAL ENCOUNTER (EMERGENCY)
Facility: HOSPITAL | Age: 52
Discharge: HOME | End: 2022-09-15
Attending: EMERGENCY MEDICINE | Admitting: EMERGENCY MEDICINE

## 2022-09-15 ENCOUNTER — APPOINTMENT (OUTPATIENT)
Dept: CT IMAGING | Facility: HOSPITAL | Age: 52
End: 2022-09-15

## 2022-09-15 ENCOUNTER — APPOINTMENT (OUTPATIENT)
Dept: GENERAL RADIOLOGY | Facility: HOSPITAL | Age: 52
End: 2022-09-15

## 2022-09-15 VITALS
OXYGEN SATURATION: 100 % | HEIGHT: 64 IN | DIASTOLIC BLOOD PRESSURE: 100 MMHG | HEART RATE: 88 BPM | BODY MASS INDEX: 35.53 KG/M2 | TEMPERATURE: 98.3 F | SYSTOLIC BLOOD PRESSURE: 135 MMHG | WEIGHT: 208.11 LBS | RESPIRATION RATE: 16 BRPM

## 2022-09-15 DIAGNOSIS — M54.12 LEFT CERVICAL RADICULOPATHY: Primary | ICD-10-CM

## 2022-09-15 DIAGNOSIS — M50.30 DEGENERATIVE DISC DISEASE, CERVICAL: ICD-10-CM

## 2022-09-15 LAB
ALBUMIN SERPL-MCNC: 4.5 G/DL (ref 3.5–5.2)
ALBUMIN/GLOB SERPL: 1.3 G/DL
ALP SERPL-CCNC: 78 U/L (ref 39–117)
ALT SERPL W P-5'-P-CCNC: 45 U/L (ref 1–33)
ANION GAP SERPL CALCULATED.3IONS-SCNC: 9.8 MMOL/L (ref 5–15)
AST SERPL-CCNC: 25 U/L (ref 1–32)
BASOPHILS # BLD AUTO: 0.04 10*3/MM3 (ref 0–0.2)
BASOPHILS NFR BLD AUTO: 0.7 % (ref 0–1.5)
BILIRUB SERPL-MCNC: 0.3 MG/DL (ref 0–1.2)
BUN SERPL-MCNC: 18 MG/DL (ref 6–20)
BUN/CREAT SERPL: 20.5 (ref 7–25)
CALCIUM SPEC-SCNC: 10.2 MG/DL (ref 8.6–10.5)
CHLORIDE SERPL-SCNC: 100 MMOL/L (ref 98–107)
CO2 SERPL-SCNC: 30.2 MMOL/L (ref 22–29)
CREAT SERPL-MCNC: 0.88 MG/DL (ref 0.57–1)
DEPRECATED RDW RBC AUTO: 38.5 FL (ref 37–54)
EGFRCR SERPLBLD CKD-EPI 2021: 78.7 ML/MIN/1.73
EOSINOPHIL # BLD AUTO: 0.12 10*3/MM3 (ref 0–0.4)
EOSINOPHIL NFR BLD AUTO: 2 % (ref 0.3–6.2)
ERYTHROCYTE [DISTWIDTH] IN BLOOD BY AUTOMATED COUNT: 13.5 % (ref 12.3–15.4)
GLOBULIN UR ELPH-MCNC: 3.4 GM/DL
GLUCOSE SERPL-MCNC: 127 MG/DL (ref 65–99)
HCT VFR BLD AUTO: 42.1 % (ref 34–46.6)
HGB BLD-MCNC: 13.6 G/DL (ref 12–15.9)
HOLD SPECIMEN: NORMAL
IMM GRANULOCYTES # BLD AUTO: 0.03 10*3/MM3 (ref 0–0.05)
IMM GRANULOCYTES NFR BLD AUTO: 0.5 % (ref 0–0.5)
LIPASE SERPL-CCNC: 80 U/L (ref 13–60)
LYMPHOCYTES # BLD AUTO: 2.46 10*3/MM3 (ref 0.7–3.1)
LYMPHOCYTES NFR BLD AUTO: 40.5 % (ref 19.6–45.3)
MAGNESIUM SERPL-MCNC: 2 MG/DL (ref 1.6–2.6)
MCH RBC QN AUTO: 25.6 PG (ref 26.6–33)
MCHC RBC AUTO-ENTMCNC: 32.3 G/DL (ref 31.5–35.7)
MCV RBC AUTO: 79.3 FL (ref 79–97)
MONOCYTES # BLD AUTO: 0.48 10*3/MM3 (ref 0.1–0.9)
MONOCYTES NFR BLD AUTO: 7.9 % (ref 5–12)
NEUTROPHILS NFR BLD AUTO: 2.95 10*3/MM3 (ref 1.7–7)
NEUTROPHILS NFR BLD AUTO: 48.4 % (ref 42.7–76)
NRBC BLD AUTO-RTO: 0 /100 WBC (ref 0–0.2)
NT-PROBNP SERPL-MCNC: 121.8 PG/ML (ref 0–900)
PLATELET # BLD AUTO: 267 10*3/MM3 (ref 140–450)
PMV BLD AUTO: 8.6 FL (ref 6–12)
POTASSIUM SERPL-SCNC: 3.6 MMOL/L (ref 3.5–5.2)
PROT SERPL-MCNC: 7.9 G/DL (ref 6–8.5)
RBC # BLD AUTO: 5.31 10*6/MM3 (ref 3.77–5.28)
SODIUM SERPL-SCNC: 140 MMOL/L (ref 136–145)
TROPONIN I SERPL-MCNC: 0 NG/ML (ref 0–0.08)
TROPONIN I SERPL-MCNC: 0 NG/ML (ref 0–0.08)
WBC NRBC COR # BLD: 6.08 10*3/MM3 (ref 3.4–10.8)
WHOLE BLOOD HOLD COAG: NORMAL
WHOLE BLOOD HOLD SPECIMEN: NORMAL

## 2022-09-15 PROCEDURE — 93005 ELECTROCARDIOGRAM TRACING: CPT

## 2022-09-15 PROCEDURE — 99283 EMERGENCY DEPT VISIT LOW MDM: CPT

## 2022-09-15 PROCEDURE — 83690 ASSAY OF LIPASE: CPT

## 2022-09-15 PROCEDURE — 85025 COMPLETE CBC W/AUTO DIFF WBC: CPT

## 2022-09-15 PROCEDURE — 71045 X-RAY EXAM CHEST 1 VIEW: CPT

## 2022-09-15 PROCEDURE — 83880 ASSAY OF NATRIURETIC PEPTIDE: CPT

## 2022-09-15 PROCEDURE — 84484 ASSAY OF TROPONIN QUANT: CPT

## 2022-09-15 PROCEDURE — 36415 COLL VENOUS BLD VENIPUNCTURE: CPT

## 2022-09-15 PROCEDURE — 96372 THER/PROPH/DIAG INJ SC/IM: CPT

## 2022-09-15 PROCEDURE — 93005 ELECTROCARDIOGRAM TRACING: CPT | Performed by: EMERGENCY MEDICINE

## 2022-09-15 PROCEDURE — 25010000002 DEXAMETHASONE SODIUM PHOSPHATE 10 MG/ML SOLUTION: Performed by: NURSE PRACTITIONER

## 2022-09-15 PROCEDURE — 72125 CT NECK SPINE W/O DYE: CPT

## 2022-09-15 PROCEDURE — 25010000002 KETOROLAC TROMETHAMINE PER 15 MG: Performed by: NURSE PRACTITIONER

## 2022-09-15 PROCEDURE — 83735 ASSAY OF MAGNESIUM: CPT

## 2022-09-15 PROCEDURE — 80053 COMPREHEN METABOLIC PANEL: CPT

## 2022-09-15 PROCEDURE — 93010 ELECTROCARDIOGRAM REPORT: CPT | Performed by: INTERNAL MEDICINE

## 2022-09-15 RX ORDER — KETOROLAC TROMETHAMINE 30 MG/ML
30 INJECTION, SOLUTION INTRAMUSCULAR; INTRAVENOUS ONCE
Status: COMPLETED | OUTPATIENT
Start: 2022-09-15 | End: 2022-09-15

## 2022-09-15 RX ORDER — DEXAMETHASONE SODIUM PHOSPHATE 10 MG/ML
10 INJECTION, SOLUTION INTRAMUSCULAR; INTRAVENOUS ONCE
Status: COMPLETED | OUTPATIENT
Start: 2022-09-15 | End: 2022-09-15

## 2022-09-15 RX ORDER — ASPIRIN 81 MG/1
324 TABLET, CHEWABLE ORAL ONCE
Status: DISCONTINUED | OUTPATIENT
Start: 2022-09-15 | End: 2022-09-16 | Stop reason: HOSPADM

## 2022-09-15 RX ORDER — KETOROLAC TROMETHAMINE 10 MG/1
10 TABLET, FILM COATED ORAL EVERY 6 HOURS PRN
Qty: 15 TABLET | Refills: 0 | Status: SHIPPED | OUTPATIENT
Start: 2022-09-15 | End: 2023-01-09

## 2022-09-15 RX ORDER — SODIUM CHLORIDE 0.9 % (FLUSH) 0.9 %
10 SYRINGE (ML) INJECTION AS NEEDED
Status: DISCONTINUED | OUTPATIENT
Start: 2022-09-15 | End: 2022-09-16 | Stop reason: HOSPADM

## 2022-09-15 RX ADMIN — DEXAMETHASONE SODIUM PHOSPHATE 10 MG: 10 INJECTION, SOLUTION INTRAMUSCULAR; INTRAVENOUS at 22:45

## 2022-09-15 RX ADMIN — KETOROLAC TROMETHAMINE 30 MG: 30 INJECTION, SOLUTION INTRAMUSCULAR; INTRAVENOUS at 22:44

## 2022-09-16 LAB — HOLD SPECIMEN: NORMAL

## 2022-09-16 NOTE — ED PROVIDER NOTES
Time: 8:47 PM EDT  Arrived by: private car  Chief Complaint: neck pain  History provided by: patient  History is limited by: N/A     Patient complains of neck pain that is chronic. She reports she has been performing PT and has been there three times. She states that she received traction and the pain in her neck has gotten worse. She reports she has pain that radiates down bilateral upper extremities. She denies any new injuries. She denies any loss of function. She states that she saw a surgeon approximately 2 years ago and states that he told her at that time that she probably needed neck surgery for her history of her bulging disks.  She reports she is attempted to get back into see a neurosurgeon, however they will not see her without MRIs.    History of Present Illness:  Patient is a 51 y.o. year old female who presents to the emergency department with neck pain that worsened with recent physical therapy traction therapy.           History provided by:  Patient  Neck Pain  Pain location:  Generalized neck  Quality:  Shooting  Pain radiates to:  L shoulder, L arm and L forearm  Pain severity:  Severe  Pain is:  Same all the time  Duration:  2 days  Timing:  Constant  Progression:  Unchanged  Chronicity:  Chronic  Context: not fall, not jumping from heights, not lifting a heavy object, not MCA, not MVC, not pedestrian accident and not recent injury    Relieved by:  Nothing  Worsened by:  Bending and twisting  Ineffective treatments:  NSAIDs  Associated symptoms: tingling (left arm)    Associated symptoms: no bladder incontinence, no bowel incontinence, no chest pain, no fever, no headaches, no leg pain, no numbness, no paresis, no photophobia, no syncope, no visual change, no weakness and no weight loss        Similar Symptoms Previously: yes  Recently seen: yes, PCP      Patient Care Team  Primary Care Provider: Mauri Vital MD    Past Medical History:     Allergies   Allergen Reactions   • Diethyl  Toluamide (Deet) Anaphylaxis   • Fluconazole Swelling and Unknown - High Severity   • Wasp Venom Anaphylaxis   • Eggs Or Egg-Derived Products GI Intolerance   • Measles Virus Vaccine Other (See Comments)     DEVELOPED FEVER AS A CHILD   • Peanut-Containing Drug Products Unknown - High Severity     STATES CAUSES THROAT TINGLING     Past Medical History:   Diagnosis Date   • DDD (degenerative disc disease), cervical    • Eczema     GENERALIZED   • Hypertension    • Seasonal allergies    • Tuberculosis 1996    NONACTIVE, NO CURRENT ISSUES, FOLLOWS W/PCP   • Uterine prolapse      Past Surgical History:   Procedure Laterality Date   • VAGINAL HYSTERECTOMY N/A 6/11/2021    Procedure: VAGINAL HYSTERECTOMY;  Surgeon: Popeye Fox MD;  Location: [facility] MAIN OR;  Service: Gynecology;  Laterality: N/A;   • WISDOM TOOTH EXTRACTION       Family History   Problem Relation Age of Onset   • Diabetes Father    • Diabetes Paternal Grandmother        Home Medications:  Prior to Admission medications    Medication Sig Start Date End Date Taking? Authorizing Provider   EPINEPHrine (EPIPEN) 0.3 MG/0.3ML solution auto-injector injection 0.3 mg 4/12/22   Emergency, Nurse Beth, RN   fexofenadine (ALLEGRA) 180 MG tablet Take 180 mg by mouth Every Night.    Provider, MD Eduardo   fluticasone (Flonase) 50 MCG/ACT nasal spray 2 sprays into the nostril(s) as directed by provider Daily. 8/31/22   Mauri Vital MD   ibuprofen (ADVIL,MOTRIN) 800 MG tablet Take 1 tablet by mouth Every 8 (Eight) Hours As Needed for Mild Pain . 6/11/21   Popeye Fox MD   losartan-hydrochlorothiazide (Hyzaar) 100-25 MG per tablet Take 1 tablet by mouth Daily. 8/31/22   Mauri Vital MD        Social History:   Social History     Tobacco Use   • Smoking status: Former Smoker     Packs/day: 1.00     Years: 20.00     Pack years: 20.00     Types: Cigarettes     Quit date: 2007     Years since quitting: 15.7   • Smokeless tobacco: Never Used   •  "Tobacco comment: QUIT APPROX 16 YRS   Vaping Use   • Vaping Use: Never used   Substance Use Topics   • Alcohol use: Never   • Drug use: Never     Recent travel: no     Review of Systems:  Review of Systems   Constitutional: Negative for chills, fatigue, fever and weight loss.   HENT: Negative for congestion, ear pain, rhinorrhea and sore throat.    Eyes: Negative.  Negative for photophobia and pain.   Respiratory: Negative for cough, chest tightness and shortness of breath.    Cardiovascular: Negative for chest pain, palpitations and syncope.   Gastrointestinal: Negative for abdominal pain, bowel incontinence, diarrhea, nausea and vomiting.   Endocrine: Negative.    Genitourinary: Negative for bladder incontinence, decreased urine volume, difficulty urinating, flank pain, frequency, hematuria and urgency.   Musculoskeletal: Positive for neck pain. Negative for arthralgias, joint swelling and myalgias.   Skin: Negative for color change, pallor, rash and wound.   Allergic/Immunologic: Negative.    Neurological: Positive for tingling (left arm). Negative for dizziness, seizures, syncope, weakness, light-headedness, numbness and headaches.   Hematological: Negative.    Psychiatric/Behavioral: Negative for agitation and confusion. The patient is not nervous/anxious.    All other systems reviewed and are negative.       Physical Exam:  /100 (BP Location: Right arm, Patient Position: Sitting)   Pulse 88   Temp 98.3 °F (36.8 °C) (Oral)   Resp 16   Ht 162.6 cm (64\")   Wt 94.4 kg (208 lb 1.8 oz)   LMP 04/07/2021 (Approximate)   SpO2 100%   BMI 35.72 kg/m²     Physical Exam  Vitals and nursing note reviewed.   Constitutional:       General: She is not in acute distress.     Appearance: Normal appearance. She is not ill-appearing or toxic-appearing.   HENT:      Head: Normocephalic and atraumatic.      Mouth/Throat:      Mouth: Mucous membranes are moist.   Eyes:      General: No scleral icterus.     Pupils: " Pupils are equal, round, and reactive to light.   Cardiovascular:      Rate and Rhythm: Normal rate and regular rhythm.      Pulses: Normal pulses.      Heart sounds: Normal heart sounds.   Pulmonary:      Effort: Pulmonary effort is normal. No respiratory distress.      Breath sounds: Normal breath sounds.   Abdominal:      General: Abdomen is flat.      Palpations: Abdomen is soft.      Tenderness: There is no abdominal tenderness.   Musculoskeletal:      Cervical back: Neck supple. Torticollis and tenderness (Posterior cervical) present. No edema, erythema, signs of trauma, rigidity or crepitus. Pain with movement present. Decreased range of motion.   Skin:     General: Skin is warm and dry.   Neurological:      General: No focal deficit present.      Mental Status: She is alert and oriented to person, place, and time. Mental status is at baseline.      Gait: Gait normal.   Psychiatric:         Mood and Affect: Mood normal.         Behavior: Behavior normal.                Medications in the Emergency Department:  Medications   sodium chloride 0.9 % flush 10 mL (has no administration in time range)   aspirin chewable tablet 324 mg (324 mg Oral Not Given 9/15/22 2245)   ketorolac (TORADOL) injection 30 mg (30 mg Intramuscular Given 9/15/22 2244)   dexamethasone sodium phosphate injection 10 mg (10 mg Intramuscular Given 9/15/22 2245)        Labs  Lab Results (last 24 hours)     Procedure Component Value Units Date/Time    POC Troponin I with Hold Tube [[id number]] Collected: 09/15/22 1816    Specimen: Blood Updated: 09/15/22 1901    Narrative:      The following orders were created for panel order POC Troponin I with Hold Tube.  Procedure                               Abnormality         Status                     ---------                               -----------         ------                     POC Troponin I[[id number]]                                                              HOLD Troponin-I  Tube[[id number]]                             Final result                 Please view results for these tests on the individual orders.    CBC & Differential [[id number]]  (Abnormal) Collected: 09/15/22 1816    Specimen: Blood Updated: 09/15/22 1904    Narrative:      The following orders were created for panel order CBC & Differential.  Procedure                               Abnormality         Status                     ---------                               -----------         ------                     CBC Auto Differential[[id number]]        Abnormal            Final result                 Please view results for these tests on the individual orders.    Comprehensive Metabolic Panel [[id number]]  (Abnormal) Collected: 09/15/22 1816    Specimen: Blood Updated: 09/15/22 1936     Glucose 127 mg/dL      BUN 18 mg/dL      Creatinine 0.88 mg/dL      Sodium 140 mmol/L      Potassium 3.6 mmol/L      Chloride 100 mmol/L      CO2 30.2 mmol/L      Calcium 10.2 mg/dL      Total Protein 7.9 g/dL      Albumin 4.50 g/dL      ALT (SGPT) 45 U/L      AST (SGOT) 25 U/L      Alkaline Phosphatase 78 U/L      Total Bilirubin 0.3 mg/dL      Globulin 3.4 gm/dL      A/G Ratio 1.3 g/dL      BUN/Creatinine Ratio 20.5     Anion Gap 9.8 mmol/L      eGFR 78.7 mL/min/1.73      Comment: National Kidney Foundation and American Society of Nephrology (ASN) Task Force recommended calculation based on the Chronic Kidney Disease Epidemiology Collaboration (CKD-EPI) equation refit without adjustment for race.       Narrative:      GFR Normal >60  Chronic Kidney Disease <60  Kidney Failure <15      Lipase [[id number]]  (Abnormal) Collected: 09/15/22 1816    Specimen: Blood Updated: 09/15/22 1936     Lipase 80 U/L     BNP [[id number]]  (Normal) Collected: 09/15/22 1816    Specimen: Blood Updated: 09/15/22 1929     proBNP 121.8 pg/mL     Narrative:      Among patients with dyspnea, NT-proBNP is highly sensitive for the detection of acute congestive heart  failure. In addition NT-proBNP of <300 pg/ml effectively rules out acute congestive heart failure with 99% negative predictive value.    Results may be falsely decreased if patient taking Biotin.      Magnesium [[id number]]  (Normal) Collected: 09/15/22 1816    Specimen: Blood Updated: 09/15/22 1936     Magnesium 2.0 mg/dL     CBC Auto Differential [[id number]]  (Abnormal) Collected: 09/15/22 1816    Specimen: Blood Updated: 09/15/22 1904     WBC 6.08 10*3/mm3      RBC 5.31 10*6/mm3      Hemoglobin 13.6 g/dL      Hematocrit 42.1 %      MCV 79.3 fL      MCH 25.6 pg      MCHC 32.3 g/dL      RDW 13.5 %      RDW-SD 38.5 fl      MPV 8.6 fL      Platelets 267 10*3/mm3      Neutrophil % 48.4 %      Lymphocyte % 40.5 %      Monocyte % 7.9 %      Eosinophil % 2.0 %      Basophil % 0.7 %      Immature Grans % 0.5 %      Neutrophils, Absolute 2.95 10*3/mm3      Lymphocytes, Absolute 2.46 10*3/mm3      Monocytes, Absolute 0.48 10*3/mm3      Eosinophils, Absolute 0.12 10*3/mm3      Basophils, Absolute 0.04 10*3/mm3      Immature Grans, Absolute 0.03 10*3/mm3      nRBC 0.0 /100 WBC     POC Troponin I [[id number]]  (Normal) Collected: 09/15/22 1821    Specimen: Blood Updated: 09/15/22 1833     Troponin I 0.00 ng/mL      Comment: Serial Number: [id number]:  [id number]       POC Troponin I with Hold Tube [[id number]] Collected: 09/15/22 2139    Specimen: Blood Updated: 09/16/22 0025    Narrative:      The following orders were created for panel order POC Troponin I with Hold Tube.  Procedure                               Abnormality         Status                     ---------                               -----------         ------                     POC Troponin I[[id number]]                                                              HOLD Troponin-I Tube[[id number]]                             In process                   Please view results for these tests on the individual orders.    POC Troponin I [[id number]]  (Normal) Collected:  09/15/22 2143    Specimen: Blood Updated: 09/15/22 2155     Troponin I 0.00 ng/mL      Comment: Serial Number: [id number]:  [id number]              Imaging:  CT Cervical Spine Without Contrast    Result Date: 9/15/2022  PROCEDURE: CT CERVICAL SPINE WO CONTRAST  COMPARISON: None.  INDICATIONS: neck pain; history of neck trauma/injury approximately 2 weeks previously  PROTOCOL:   Standard CT imaging protocol performed.    RADIATION:   Total DLP: 436.9 mGy*cm.   MA and/or KV were/was adjusted to minimize radiation dose.    TECHNIQUE: After obtaining the patient's consent, multi-planar CT images (361 CT images) were created without contrast material.  EXAM FINDINGS: A routine nonenhanced cervical spine CT was performed. Sagittal and coronal two-dimensional reformations are provided for review. No acute cervical spine fracture or acute malalignment is identified. Small nonspecific bilateral cervical lymph nodes are seen.  Moderate-to-severe degenerative changes involve the imaged spine, especially manifested as disc and endplate degenerative changes, probably greatest at C5-6 and C6-7.  Degenerative changes also involve the atlantoaxial joint.  There is chronic retrolisthesis suspected, especially at C5-6, estimated at 3 mm.  Chronic anterolisthesis is suspected, especially at C3-4, estimated at 2 mm.  Congenital or acquired fusion is seen on the left involving the facet joints at C2-3, and possibly to a lesser degree contralaterally.  Foraminal narrowing is seen at several levels, especially left greater than right C4 foraminal narrowing, right greater than left C5 foraminal narrowing, right greater than left C6 foraminal narrowing, and bilateral C7 foraminal narrowing.  Uncovertebral and facet osteoarthritis is seen at several levels, especially from C3 to C7.  There is mild to moderate spinal canal narrowing at C6-7 with suspected diffuse disc-osteophyte complex.  There is nonspecific straightening of the cervical  lordosis.  There may be mild cerebellar tonsillar ectopia.        No acute cervical spine fracture is seen.  Moderate-to-severe degenerative changes involve the imaged spine are seen at multiple levels, as detailed above.    Please note that portions of this note were completed with a voice recognition program.  TAVON RODRIGUEZ JR, MD       Electronically Signed and Approved By: TAVON RODRIGUEZ JR, MD on 9/15/2022 at 21:42             XR Chest 1 View    Result Date: 9/15/2022  PROCEDURE: XR CHEST 1 VW  COMPARISON: [facility], , XR CHEST 2 VW, 8/28/2022, 19:15.  INDICATIONS: SHORT OF BREATH, CHEST PAINS X TODAY  FINDINGS:  The lungs are adequately expanded. There is no focal consolidation, pneumothorax, or obvious pleural effusion. The pulmonary vasculature appears within normal limits. The cardiac and mediastinal contours are within normal limits. The osseous structures appear intact.        No acute cardiopulmonary process.        BLANCHE WRIGHT MD       Electronically Signed and Approved By: BLANCHE WRIGHT MD on 9/15/2022 at 19:44               Procedures:  Procedures    Progress  ED Course as of 09/16/22 0059   Thu Sep 15, 2022   2047 --- PROVIDER IN TRIAGE NOTE ---    The patient was evaluated in triage by Alexus colunga. Orders were placed and the patient is currently awaiting disposition. [AR]      ED Course User Index  [AR] Alexus Rivera APRN                            The patient was initially evaluated in the triage area where orders were placed. The patient was later dispositioned by KASSANDRA Lara.      Medical Decision Making:  MDM  Number of Diagnoses or Management Options  Degenerative disc disease, cervical: established and worsening  Left cervical radiculopathy: new and requires workup  Diagnosis management comments: Pt reports improved pain level after Toradol and Decadron. Pt has follow up with PCP scheduled. Reports she will see if the MRI that was ordered but denied by  insurance can be appealed. Return to ER precautions discussed.     I have spoken with the patient. I have explained the patient´s condition, diagnoses and treatment plan based on the information available to me at this time. I have answered the patient's questions and addressed any concerns. The patient has a good  understanding of the patient´s diagnosis, condition, and treatment plan as can be expected at this point. The vital signs have been stable. The patient´s condition is stable and appropriate for discharge from the emergency department.      The patient will pursue further outpatient evaluation with the primary care physician or other designated or consulting physician as outlined in the discharge instructions. They are agreeable to this plan of care and follow-up instructions have been explained in detail. The patient has received these instructions in written format and have expressed an understanding of the discharge instructions. The patient is aware that any significant change in condition or worsening of symptoms should prompt an immediate return to this or the closest emergency department or call to 911.       Amount and/or Complexity of Data Reviewed  Clinical lab tests: reviewed  Tests in the radiology section of CPT®: reviewed  Tests in the medicine section of CPT®: reviewed    Risk of Complications, Morbidity, and/or Mortality  Presenting problems: moderate  Diagnostic procedures: moderate  Management options: low    Patient Progress  Patient progress: improved       Final diagnoses:   Left cervical radiculopathy   Degenerative disc disease, cervical        Disposition:  ED Disposition     ED Disposition   Discharge    Condition   Stable    Comment   --             This medical record created using voice recognition software.           Louis Yusuf, APRN  09/16/22 0059

## 2022-09-16 NOTE — DISCHARGE INSTRUCTIONS
Follow up with your primary for further evaluation and possible further testing. Return to the ER for loss of control of bowel or bladder function, numbness/tingling or any concerns.

## 2022-09-21 ENCOUNTER — OFFICE VISIT (OUTPATIENT)
Dept: INTERNAL MEDICINE | Facility: CLINIC | Age: 52
End: 2022-09-21

## 2022-09-21 VITALS
HEIGHT: 64 IN | OXYGEN SATURATION: 95 % | TEMPERATURE: 98 F | BODY MASS INDEX: 35 KG/M2 | SYSTOLIC BLOOD PRESSURE: 147 MMHG | DIASTOLIC BLOOD PRESSURE: 87 MMHG | HEART RATE: 98 BPM | WEIGHT: 205 LBS

## 2022-09-21 DIAGNOSIS — H66.001 NON-RECURRENT ACUTE SUPPURATIVE OTITIS MEDIA OF RIGHT EAR WITHOUT SPONTANEOUS RUPTURE OF TYMPANIC MEMBRANE: ICD-10-CM

## 2022-09-21 DIAGNOSIS — I10 ESSENTIAL HYPERTENSION: ICD-10-CM

## 2022-09-21 DIAGNOSIS — R68.84 JAW PAIN: ICD-10-CM

## 2022-09-21 DIAGNOSIS — M54.2 CERVICALGIA: Primary | ICD-10-CM

## 2022-09-21 PROCEDURE — 99214 OFFICE O/P EST MOD 30 MIN: CPT | Performed by: STUDENT IN AN ORGANIZED HEALTH CARE EDUCATION/TRAINING PROGRAM

## 2022-09-21 RX ORDER — AMOXICILLIN AND CLAVULANATE POTASSIUM 875; 125 MG/1; MG/1
1 TABLET, FILM COATED ORAL 2 TIMES DAILY
Qty: 14 TABLET | Refills: 0 | Status: SHIPPED | OUTPATIENT
Start: 2022-09-21 | End: 2022-09-28

## 2022-09-21 NOTE — PROGRESS NOTES
Chief Complaint  Neck Pain (Was going to physical therapy. Patient was put on traction at 12 lbs and should of never been per her. She had to be seen in the ED. /Patient communicated this at the next appointment with a different therapist. /Patient she is extremely tired to the point of not functioning afterwards. /Has been experiencing dizziness and almost tripping over her feet. ), Arm Pain (Shoulder area, radiating down the arms. ), and Facial Pain (Patient is experiencing pain through her face now. )   Subjective          Holly Apple presents to [facility]  History of Present Illness    Neck Pain:    Reports that she has had a very bad experience with her physical therapist.  Reports has had a total of 3 sessions with her physical therapist whom she says is named Alfredo at [facility] in Physical Therapy.  She reports that without warm up she was placed in traction and 12 pounds and that this made her neck pain worse.  She reports that she was experiencing jaw pain and reported this to her physical therapist reported that he cannot help her with her jaw pain.  She presented to ER where she had a CT of her neck.  She reports that in addition to her neck pain she has radiation of pain into both her arms right and left as well as into her jaw and head.  Of note, she is tearful as she reports the story of her negative experience with physical therapy.  She denies vertigo or sensation of room spinning.  She does report otalgia in both ears.    HTN:    Reports had low blood pressure after a brief trial of 100/25 dosage of hyzaar, but she is uncertain what the readings were specifically.  Currently taking the 50/12.5 dosage.    No chest pain recently.  Has not yet scheduled stress test as she didn't have time to both do this as well as have stress test.  I did encourage her to schedule stress test today.      Current Outpatient Medications   Medication Instructions   •  "amoxicillin-clavulanate (Augmentin) 875-125 MG per tablet 1 tablet, Oral, 2 Times Daily   • EPINEPHrine (EPIPEN) 0.3 MG/0.3ML solution auto-injector injection 0.3 mg   • fexofenadine (ALLEGRA) 180 mg, Oral, Nightly   • fluticasone (Flonase) 50 MCG/ACT nasal spray 2 sprays, Nasal, Daily   • ketorolac (TORADOL) 10 mg, Oral, Every 6 Hours PRN   • losartan-hydrochlorothiazide (Hyzaar) 100-25 MG per tablet 1 tablet, Oral, Daily       The following portions of the patient's history were reviewed and updated as appropriate: allergies, current medications, past family history, past medical history, past social history, past surgical history, and problem list.    Objective   Vital Signs:   /87 (BP Location: Left arm, Patient Position: Sitting, Cuff Size: Adult)   Pulse 98   Temp 98 °F (36.7 °C) (Temporal)   Ht 162.6 cm (64\")   Wt 93 kg (205 lb)   SpO2 95%   BMI 35.19 kg/m²     Wt Readings from Last 3 Encounters:   09/21/22 93 kg (205 lb)   09/15/22 94.4 kg (208 lb 1.8 oz)   08/31/22 93.4 kg (206 lb)     BP Readings from Last 3 Encounters:   09/21/22 147/87   09/15/22 135/100   08/31/22 141/81     Physical Exam  Vitals reviewed.   Constitutional:       Appearance: Normal appearance.   HENT:      Right Ear: Ear canal and external ear normal. A middle ear effusion is present.      Left Ear: Tympanic membrane, ear canal and external ear normal.      Mouth/Throat:      Mouth: Mucous membranes are moist.      Pharynx: Oropharynx is clear.      Comments: No abnormality on exam of teeth and gums  Eyes:      Conjunctiva/sclera: Conjunctivae normal.   Cardiovascular:      Rate and Rhythm: Normal rate and regular rhythm.      Pulses: Normal pulses.      Heart sounds: Normal heart sounds. No murmur heard.  Pulmonary:      Effort: Pulmonary effort is normal.      Breath sounds: Normal breath sounds. No wheezing.   Abdominal:      General: Abdomen is flat.      Palpations: Abdomen is soft.   Musculoskeletal:      Right lower " leg: No edema.      Left lower leg: No edema.   Skin:     General: Skin is warm and dry.   Neurological:      General: No focal deficit present.      Mental Status: She is alert.   Psychiatric:         Behavior: Behavior normal.         Thought Content: Thought content normal.         Judgment: Judgment normal.          Result Review :   The following data was reviewed by: Mauri Vital MD on 09/21/2022:  Common labs    Common Labs 7/27/22 7/27/22 7/27/22 8/31/22 9/15/22 9/15/22    1447 1447 1447  1816 1816   Glucose  86  113 (A)  127 (A)   BUN  14  13  18   Creatinine  0.90  0.90  0.88   Sodium  139  136  140   Potassium  4.3  3.6  3.6   Chloride  101  98  100   Calcium  9.7  9.5  10.2   Albumin  4.70  4.50  4.50   Total Bilirubin  0.2  0.3  0.3   Alkaline Phosphatase  72  73  78   AST (SGOT)  24  26  25   ALT (SGPT)  38 (A)  44 (A)  45 (A)   WBC 6.81    6.08    Hemoglobin 13.8    13.6    Hematocrit 42.0    42.1    Platelets 315    267    Total Cholesterol   209 (A)      Triglycerides   154 (A)      HDL Cholesterol   54      LDL Cholesterol    128 (A)      (A) Abnormal value                   Lab Results   Component Value Date    SARSANTIGEN Detected (A) 08/08/2022    COVID19 Not Detected 06/07/2021    RAPFLUA Negative 05/09/2022    RAPFLUB Negative 05/09/2022    FLUAAG Not Detected 08/08/2022    FLUBAG Not Detected 08/08/2022    BILIRUBINUR Negative 08/28/2022       Procedures        Assessment and Plan    Diagnoses and all orders for this visit:    1. Cervicalgia (Primary)  Comments:  -has had issues with PT  -had CT recently in ED, will order MRI  Orders:  -     MRI Cervical Spine Without Contrast; Future    2. Jaw pain  Comments:  -I suspect that this is referred pain from neck    3. Essential hypertension  Comments:  -BP above goal, but currently in pain  -will continue current regimen and re-evaluate at next appointment.    4. Non-recurrent acute suppurative otitis media of right ear without spontaneous  rupture of tympanic membrane  -     amoxicillin-clavulanate (Augmentin) 875-125 MG per tablet; Take 1 tablet by mouth 2 (Two) Times a Day for 7 days.  Dispense: 14 tablet; Refill: 0          There are no discontinued medications.       Follow Up   Return in about 2 months (around 11/21/2022) for Annual physical.  Patient was given instructions and counseling regarding her condition or for health maintenance advice. Please see specific information pulled into the AVS if appropriate.       Mauri Vital MD  09/21/22  15:02 EDT

## 2022-09-29 LAB
QT INTERVAL: 376 MS
QT INTERVAL: 450 MS

## 2022-10-05 ENCOUNTER — HOSPITAL ENCOUNTER (OUTPATIENT)
Dept: MAMMOGRAPHY | Facility: HOSPITAL | Age: 52
Discharge: HOME | End: 2022-10-05
Admitting: STUDENT IN AN ORGANIZED HEALTH CARE EDUCATION/TRAINING PROGRAM

## 2022-10-05 DIAGNOSIS — Z12.31 SCREENING MAMMOGRAM FOR BREAST CANCER: ICD-10-CM

## 2022-10-05 DIAGNOSIS — Z76.89 ESTABLISHING CARE WITH NEW DOCTOR, ENCOUNTER FOR: ICD-10-CM

## 2022-10-05 PROCEDURE — 77063 BREAST TOMOSYNTHESIS BI: CPT

## 2022-10-05 PROCEDURE — 77067 SCR MAMMO BI INCL CAD: CPT

## 2022-10-06 ENCOUNTER — TELEPHONE (OUTPATIENT)
Dept: INTERNAL MEDICINE | Facility: CLINIC | Age: 52
End: 2022-10-06

## 2022-10-06 NOTE — TELEPHONE ENCOUNTER
----- Message from Mauri Vital MD sent at 10/6/2022 10:54 AM EDT -----  Mammogram with no evidence of malignancy.

## 2022-10-06 NOTE — TELEPHONE ENCOUNTER
Okay for hub to read/advise    Left message to call back     ----- Message from Mauri Vital MD sent at 10/6/2022 10:54 AM EDT -----  Mammogram with no evidence of malignancy

## 2022-10-12 ENCOUNTER — OFFICE VISIT (OUTPATIENT)
Dept: INTERNAL MEDICINE | Facility: CLINIC | Age: 52
End: 2022-10-12

## 2022-10-12 VITALS
DIASTOLIC BLOOD PRESSURE: 84 MMHG | WEIGHT: 215.2 LBS | SYSTOLIC BLOOD PRESSURE: 126 MMHG | OXYGEN SATURATION: 97 % | TEMPERATURE: 97.5 F | HEART RATE: 80 BPM | BODY MASS INDEX: 36.74 KG/M2 | HEIGHT: 64 IN

## 2022-10-12 DIAGNOSIS — R30.0 DYSURIA: Primary | ICD-10-CM

## 2022-10-12 LAB
BILIRUB BLD-MCNC: NEGATIVE MG/DL
CLARITY, POC: CLEAR
COLOR UR: YELLOW
EXPIRATION DATE: NORMAL
GLUCOSE UR STRIP-MCNC: NEGATIVE MG/DL
KETONES UR QL: NEGATIVE
LEUKOCYTE EST, POC: NEGATIVE
Lab: NORMAL
NITRITE UR-MCNC: NEGATIVE MG/ML
PH UR: 5.5 [PH] (ref 5–8)
PROT UR STRIP-MCNC: NEGATIVE MG/DL
RBC # UR STRIP: NEGATIVE /UL
SP GR UR: 1.02 (ref 1–1.03)
UROBILINOGEN UR QL: NORMAL

## 2022-10-12 PROCEDURE — 87086 URINE CULTURE/COLONY COUNT: CPT | Performed by: NURSE PRACTITIONER

## 2022-10-12 PROCEDURE — 99213 OFFICE O/P EST LOW 20 MIN: CPT | Performed by: NURSE PRACTITIONER

## 2022-10-12 RX ORDER — NITROFURANTOIN 25; 75 MG/1; MG/1
100 CAPSULE ORAL 2 TIMES DAILY
Qty: 14 CAPSULE | Refills: 0 | Status: SHIPPED | OUTPATIENT
Start: 2022-10-12 | End: 2022-10-19

## 2022-10-12 NOTE — PROGRESS NOTES
"Chief Complaint  Back Pain, Urinary Frequency, and Earache (Right ear)    Subjective          Holly Apple presents to [facility]  Urinary Tract Infection   This is a new problem. Episode onset: 2 days ago  The problem occurs every urination. The problem has been unchanged. The quality of the pain is described as burning. There has been no fever. She is not sexually active. There is no history of pyelonephritis. Associated symptoms include flank pain, frequency and urgency. Pertinent negatives include no chills, discharge, hematuria, hesitancy, nausea, possible pregnancy, sweats or vomiting. She has tried nothing for the symptoms. The treatment provided no relief.         Current Outpatient Medications   Medication Instructions   • EPINEPHrine (EPIPEN) 0.3 MG/0.3ML solution auto-injector injection 0.3 mg   • fexofenadine (ALLEGRA) 180 mg, Oral, Nightly   • fluticasone (Flonase) 50 MCG/ACT nasal spray 2 sprays, Nasal, Daily   • ketorolac (TORADOL) 10 mg, Oral, Every 6 Hours PRN   • losartan-hydrochlorothiazide (Hyzaar) 100-25 MG per tablet 1 tablet, Oral, Daily   • nitrofurantoin (macrocrystal-monohydrate) (MACROBID) 100 mg, Oral, 2 Times Daily       The following portions of the patient's history were reviewed and updated as appropriate: allergies, current medications, past family history, past medical history, past social history, past surgical history, and problem list.    Objective   Vital Signs:   /84   Pulse 80   Temp 97.5 °F (36.4 °C) (Temporal)   Ht 162.6 cm (64\")   Wt 97.6 kg (215 lb 3.2 oz)   SpO2 97%   BMI 36.94 kg/m²     Wt Readings from Last 3 Encounters:   10/12/22 97.6 kg (215 lb 3.2 oz)   09/21/22 93 kg (205 lb)   09/15/22 94.4 kg (208 lb 1.8 oz)     BP Readings from Last 3 Encounters:   10/12/22 126/84   09/21/22 147/87   09/15/22 135/100     Physical Exam  Vitals and nursing note reviewed.   Constitutional:       General: She is not in " acute distress.     Appearance: Normal appearance. She is not ill-appearing.   HENT:      Mouth/Throat:      Mouth: Mucous membranes are moist.   Eyes:      Extraocular Movements: Extraocular movements intact.      Conjunctiva/sclera: Conjunctivae normal.   Cardiovascular:      Rate and Rhythm: Normal rate.   Pulmonary:      Effort: Pulmonary effort is normal.      Breath sounds: Normal breath sounds.   Abdominal:      General: Bowel sounds are normal. There is no distension.      Palpations: Abdomen is soft.      Tenderness: There is no abdominal tenderness. There is left CVA tenderness.   Skin:     General: Skin is warm and dry.      Capillary Refill: Capillary refill takes less than 2 seconds.   Neurological:      General: No focal deficit present.      Mental Status: She is alert and oriented to person, place, and time. Mental status is at baseline.   Psychiatric:         Mood and Affect: Mood normal.         Behavior: Behavior normal.         Thought Content: Thought content normal.         Judgment: Judgment normal.              Result Review :   The following data was reviewed by: KASSANDRA Wilder on 10/12/2022:  Common labs    Common Labs 7/27/22 7/27/22 7/27/22 8/31/22 9/15/22 9/15/22    1447 1447 1447  1816 1816   Glucose  86  113 (A)  127 (A)   BUN  14  13  18   Creatinine  0.90  0.90  0.88   Sodium  139  136  140   Potassium  4.3  3.6  3.6   Chloride  101  98  100   Calcium  9.7  9.5  10.2   Albumin  4.70  4.50  4.50   Total Bilirubin  0.2  0.3  0.3   Alkaline Phosphatase  72  73  78   AST (SGOT)  24  26  25   ALT (SGPT)  38 (A)  44 (A)  45 (A)   WBC 6.81    6.08    Hemoglobin 13.8    13.6    Hematocrit 42.0    42.1    Platelets 315    267    Total Cholesterol   209 (A)      Triglycerides   154 (A)      HDL Cholesterol   54      LDL Cholesterol    128 (A)      (A) Abnormal value                   Lab Results   Component Value Date    SARSANTIGEN Detected (A) 08/08/2022    COVID19 Not Detected  06/07/2021    RAPFLUA Negative 05/09/2022    RAPFLUB Negative 05/09/2022    FLUAAG Not Detected 08/08/2022    FLUBAG Not Detected 08/08/2022    BILIRUBINUR Negative 10/12/2022       Procedures        Assessment and Plan    Diagnoses and all orders for this visit:    1. Dysuria (Primary)  -     POCT urinalysis dipstick, automated  -     nitrofurantoin, macrocrystal-monohydrate, (Macrobid) 100 MG capsule; Take 1 capsule by mouth 2 (Two) Times a Day for 7 days.  Dispense: 14 capsule; Refill: 0  -     Urine Culture - Urine, Urine, Clean Catch      - increase fluids   - tylenol/motrin PRN    There are no discontinued medications.       Follow Up   Return if symptoms worsen or fail to improve.  Patient was given instructions and counseling regarding her condition or for health maintenance advice. Please see specific information pulled into the AVS if appropriate.       KASSANDRA Wiledr  10/14/22  12:36 EDT

## 2022-10-13 ENCOUNTER — TELEPHONE (OUTPATIENT)
Dept: INTERNAL MEDICINE | Facility: CLINIC | Age: 52
End: 2022-10-13

## 2022-10-13 LAB — BACTERIA SPEC AEROBE CULT: NO GROWTH

## 2022-10-13 NOTE — PROGRESS NOTES
Urine culture negative. If symptoms persist after treatment please have patient call the office for follow up

## 2022-10-13 NOTE — TELEPHONE ENCOUNTER
----- Message from KASSANDRA Wilder sent at 10/13/2022  8:14 AM EDT -----  Urine culture negative. If symptoms persist after treatment please have patient call the office for follow up

## 2022-10-17 ENCOUNTER — OFFICE VISIT (OUTPATIENT)
Dept: INTERNAL MEDICINE | Facility: CLINIC | Age: 52
End: 2022-10-17

## 2022-10-17 VITALS
WEIGHT: 208 LBS | BODY MASS INDEX: 35.51 KG/M2 | OXYGEN SATURATION: 97 % | HEIGHT: 64 IN | SYSTOLIC BLOOD PRESSURE: 122 MMHG | DIASTOLIC BLOOD PRESSURE: 79 MMHG | HEART RATE: 76 BPM

## 2022-10-17 DIAGNOSIS — M25.629 STIFFNESS OF UPPER ARM JOINT, UNSPECIFIED LATERALITY: ICD-10-CM

## 2022-10-17 DIAGNOSIS — M43.6 NECK STIFFNESS: Primary | ICD-10-CM

## 2022-10-17 DIAGNOSIS — H92.01 RIGHT EAR PAIN: ICD-10-CM

## 2022-10-17 PROCEDURE — 99214 OFFICE O/P EST MOD 30 MIN: CPT | Performed by: STUDENT IN AN ORGANIZED HEALTH CARE EDUCATION/TRAINING PROGRAM

## 2022-10-17 NOTE — PROGRESS NOTES
"Chief Complaint  Neck Pain (Pt states having stiff neck and arms. )    Subjective          Hollyderek Apple presents to [facility]  History of Present Illness    Reports scheduled for MRI 11/11/22.  Stress test scheduled 11/15/22.    Has tried PT at a different location (in [place]), and has had 4-5 sessions thus far. Reports even with gentle manual traction of the head, feeling \"really really stiff\" in neck and shoulders.    She also reports right otalgia.  Denies hearing loss or tinnitus.      Current Outpatient Medications   Medication Instructions   • EPINEPHrine (EPIPEN) 0.3 MG/0.3ML solution auto-injector injection 0.3 mg   • fexofenadine (ALLEGRA) 180 mg, Oral, Nightly   • fluticasone (Flonase) 50 MCG/ACT nasal spray 2 sprays, Nasal, Daily   • ketorolac (TORADOL) 10 mg, Oral, Every 6 Hours PRN   • losartan-hydrochlorothiazide (Hyzaar) 100-25 MG per tablet 1 tablet, Oral, Daily   • nitrofurantoin (macrocrystal-monohydrate) (MACROBID) 100 mg, Oral, 2 Times Daily       The following portions of the patient's history were reviewed and updated as appropriate: allergies, current medications, past family history, past medical history, past social history, past surgical history, and problem list.    Objective   Vital Signs:   /79   Pulse 76   Ht 162.6 cm (64\")   Wt 94.3 kg (208 lb)   SpO2 97%   BMI 35.70 kg/m²     Wt Readings from Last 3 Encounters:   10/17/22 94.3 kg (208 lb)   10/12/22 97.6 kg (215 lb 3.2 oz)   09/21/22 93 kg (205 lb)     BP Readings from Last 3 Encounters:   10/17/22 122/79   10/12/22 126/84   09/21/22 147/87     Physical Exam   Appearance: No acute distress, well-nourished  Head: normocephalic, atraumatic  Eyes: no scleral icterus, no conjunctival injection  Ears, Nose, and Throat: external ears normal, scar tissue noted, right TM, left TM normal in appearance  Cardiovascular: regular rate and rhythm. no murmurs, rubs, or gallops. no " edema  Respiratory: breathing comfortably, symmetric chest rise, intermittent RLLL wheeze.  Otherwise, clear to auscultation bilaterally.  GI: soft, NTTP, no masses or HSM  Neuro: alert and oriented    Result Review :   The following data was reviewed by: Mauri Vital MD on 10/17/2022:  Common labs    Common Labs 7/27/22 7/27/22 7/27/22 8/31/22 9/15/22 9/15/22    1447 1447 1447  1816 1816   Glucose  86  113 (A)  127 (A)   BUN  14  13  18   Creatinine  0.90  0.90  0.88   Sodium  139  136  140   Potassium  4.3  3.6  3.6   Chloride  101  98  100   Calcium  9.7  9.5  10.2   Albumin  4.70  4.50  4.50   Total Bilirubin  0.2  0.3  0.3   Alkaline Phosphatase  72  73  78   AST (SGOT)  24  26  25   ALT (SGPT)  38 (A)  44 (A)  45 (A)   WBC 6.81    6.08    Hemoglobin 13.8    13.6    Hematocrit 42.0    42.1    Platelets 315    267    Total Cholesterol   209 (A)      Triglycerides   154 (A)      HDL Cholesterol   54      LDL Cholesterol    128 (A)      (A) Abnormal value                   Lab Results   Component Value Date    SARSANTIGEN Detected (A) 08/08/2022    COVID19 Not Detected 06/07/2021    RAPFLUA Negative 05/09/2022    RAPFLUB Negative 05/09/2022    FLUAAG Not Detected 08/08/2022    FLUBAG Not Detected 08/08/2022    BILIRUBINUR Negative 10/12/2022       Procedures        Assessment and Plan    Diagnoses and all orders for this visit:    1. Neck stiffness (Primary)    2. Stiffness of upper arm joint, unspecified laterality    3. Right ear pain  -     Ambulatory Referral to ENT (Otolaryngology)      Neck pain:  -stable  -MRI in the near future  -I did  her that neurosurgical evaluation will be the most likely next step    There are no discontinued medications.       Follow Up   Return in about 1 month (around 11/17/2022) for Annual physical.  Patient was given instructions and counseling regarding her condition or for health maintenance advice. Please see specific information pulled into the AVS if appropriate.        Mauri Vital MD  10/17/22  20:32 EDT

## 2022-10-18 ENCOUNTER — TELEPHONE (OUTPATIENT)
Dept: INTERNAL MEDICINE | Facility: CLINIC | Age: 52
End: 2022-10-18

## 2022-10-18 ENCOUNTER — APPOINTMENT (OUTPATIENT)
Dept: MRI IMAGING | Facility: HOSPITAL | Age: 52
End: 2022-10-18

## 2022-11-11 ENCOUNTER — HOSPITAL ENCOUNTER (OUTPATIENT)
Dept: MRI IMAGING | Facility: HOSPITAL | Age: 52
Discharge: HOME | End: 2022-11-11
Admitting: STUDENT IN AN ORGANIZED HEALTH CARE EDUCATION/TRAINING PROGRAM

## 2022-11-11 DIAGNOSIS — M54.2 CERVICALGIA: ICD-10-CM

## 2022-11-11 PROCEDURE — 72141 MRI NECK SPINE W/O DYE: CPT

## 2022-11-15 ENCOUNTER — APPOINTMENT (OUTPATIENT)
Dept: NUCLEAR MEDICINE | Facility: HOSPITAL | Age: 52
End: 2022-11-15

## 2022-11-15 ENCOUNTER — TELEPHONE (OUTPATIENT)
Dept: INTERNAL MEDICINE | Facility: CLINIC | Age: 52
End: 2022-11-15

## 2022-11-15 NOTE — TELEPHONE ENCOUNTER
----- Message from Mauri Vital MD sent at 11/13/2022  3:21 PM EST -----  Imaging shows multi-level degenerative changes in the cervical spine.  There are multiple foramina that show narrowing where the nerves extend out from the spinal cord to innervate the body.  I have placed a referral to Dr. Manley of neurosurgery.  You can contact his office to schedule an appointment at your convenience.

## 2022-11-15 NOTE — TELEPHONE ENCOUNTER
CONTACTED PT REGARDING LAB RESULTS OF MRI. PATIENT VERIFIED . I INFORMED PATIENT OF THE BELOW NOTE. PATIENT VOICED UNDERSTANDING, NO FURTHER QUESTIONS. PATIENT HAS AN APT WITH DR. PAREKH 1/10/2022.

## 2022-11-30 ENCOUNTER — OFFICE VISIT (OUTPATIENT)
Dept: INTERNAL MEDICINE | Facility: CLINIC | Age: 52
End: 2022-11-30

## 2022-11-30 VITALS
OXYGEN SATURATION: 97 % | DIASTOLIC BLOOD PRESSURE: 82 MMHG | TEMPERATURE: 98.1 F | BODY MASS INDEX: 35.61 KG/M2 | HEART RATE: 80 BPM | HEIGHT: 64 IN | WEIGHT: 208.6 LBS | SYSTOLIC BLOOD PRESSURE: 128 MMHG

## 2022-11-30 DIAGNOSIS — J02.9 SORE THROAT: Primary | ICD-10-CM

## 2022-11-30 DIAGNOSIS — R09.89 SINUS COMPLAINT: ICD-10-CM

## 2022-11-30 DIAGNOSIS — R51.9 NONINTRACTABLE HEADACHE, UNSPECIFIED CHRONICITY PATTERN, UNSPECIFIED HEADACHE TYPE: ICD-10-CM

## 2022-11-30 DIAGNOSIS — J32.9 SINUSITIS, UNSPECIFIED CHRONICITY, UNSPECIFIED LOCATION: ICD-10-CM

## 2022-11-30 LAB
EXPIRATION DATE: NORMAL
EXPIRATION DATE: NORMAL
FLUAV AG UPPER RESP QL IA.RAPID: NOT DETECTED
FLUBV AG UPPER RESP QL IA.RAPID: NOT DETECTED
INTERNAL CONTROL: NORMAL
INTERNAL CONTROL: NORMAL
Lab: NORMAL
Lab: NORMAL
S PYO AG THROAT QL: NEGATIVE
SARS-COV-2 AG UPPER RESP QL IA.RAPID: NOT DETECTED

## 2022-11-30 PROCEDURE — 87880 STREP A ASSAY W/OPTIC: CPT | Performed by: STUDENT IN AN ORGANIZED HEALTH CARE EDUCATION/TRAINING PROGRAM

## 2022-11-30 PROCEDURE — 87428 SARSCOV & INF VIR A&B AG IA: CPT | Performed by: STUDENT IN AN ORGANIZED HEALTH CARE EDUCATION/TRAINING PROGRAM

## 2022-11-30 PROCEDURE — U0004 COV-19 TEST NON-CDC HGH THRU: HCPCS | Performed by: STUDENT IN AN ORGANIZED HEALTH CARE EDUCATION/TRAINING PROGRAM

## 2022-11-30 PROCEDURE — 87081 CULTURE SCREEN ONLY: CPT | Performed by: STUDENT IN AN ORGANIZED HEALTH CARE EDUCATION/TRAINING PROGRAM

## 2022-11-30 PROCEDURE — 99213 OFFICE O/P EST LOW 20 MIN: CPT | Performed by: STUDENT IN AN ORGANIZED HEALTH CARE EDUCATION/TRAINING PROGRAM

## 2022-11-30 RX ORDER — AMOXICILLIN AND CLAVULANATE POTASSIUM 875; 125 MG/1; MG/1
1 TABLET, FILM COATED ORAL 2 TIMES DAILY
Qty: 10 TABLET | Refills: 0 | Status: SHIPPED | OUTPATIENT
Start: 2022-11-30 | End: 2022-12-05

## 2022-12-01 ENCOUNTER — TELEPHONE (OUTPATIENT)
Dept: INTERNAL MEDICINE | Facility: CLINIC | Age: 52
End: 2022-12-01

## 2022-12-01 LAB — SARS-COV-2 RNA PNL SPEC NAA+PROBE: NOT DETECTED

## 2022-12-01 NOTE — TELEPHONE ENCOUNTER
ATTEMPTED TO CONTACT PT REGARDING LAB RESULTS. PT VERIFIED . I INFORMED PT OF THE BELOW RESULTS. PATIENT VOICED UNDERSTANDING. NO FURTHER QUESTIONS.

## 2022-12-01 NOTE — TELEPHONE ENCOUNTER
----- Message from Mauri Vital MD sent at 12/1/2022  5:52 AM EST -----  .PCR test for COVID is negative.    Rapid strep, flu and covid also negative.

## 2022-12-02 ENCOUNTER — TELEPHONE (OUTPATIENT)
Dept: INTERNAL MEDICINE | Facility: CLINIC | Age: 52
End: 2022-12-02

## 2022-12-02 LAB — BACTERIA SPEC AEROBE CULT: NORMAL

## 2022-12-02 NOTE — TELEPHONE ENCOUNTER
Contacted patient regarding lab results. Patient verified . I informed patient of the below result note. Patient voiced understanding, no further questions.

## 2022-12-02 NOTE — TELEPHONE ENCOUNTER
----- Message from Mauri Vital MD sent at 12/2/2022  7:53 AM EST -----  Throat culture is negative

## 2022-12-05 ENCOUNTER — TELEPHONE (OUTPATIENT)
Dept: INTERNAL MEDICINE | Facility: CLINIC | Age: 52
End: 2022-12-05

## 2022-12-05 NOTE — TELEPHONE ENCOUNTER
Caller: Holly Apple    Relationship: Self    Best call back number: [phone]    What medication are you requesting: AMOXICILLIN     What are your current symptoms: EAR INFECTION    If a prescription is needed, what is your preferred pharmacy and phone number: [facility]ACY #[id number] - [place], [place] - [address] [facility] - [phone] [facility] [phone] FX     Additional notes:      PATIENT STATES SHE STILL HAS THE INFECTION DESPITE TAKING THE AMOXICILLIN THAT DR SERNA PRESCRIBED HER. PATIENT STATES SHE ONLY RECEIVED 5 DAYS OF ANTIBIOTICS AND WOULD LIKE 5 MORE DAYS FOR A FULL ROUND.

## 2022-12-06 NOTE — TELEPHONE ENCOUNTER
Spoke to patient. Transferred from the HUB.     Patient was made aware 5 days was full course. I asked her if she wanted to schedule an appointment. She said she is going to find a new doctor because she is tired of being sick and she told me to have a good day and hung up before I could respond.

## 2023-01-04 ENCOUNTER — TELEPHONE (OUTPATIENT)
Dept: INTERNAL MEDICINE | Facility: CLINIC | Age: 53
End: 2023-01-04

## 2023-01-04 NOTE — TELEPHONE ENCOUNTER
Caller: Holly Apple    Relationship: Self    Best call back number: [phone]    What is the best time to reach you: ANY    Who are you requesting to speak with (clinical staff, provider,  specific staff member): CLINICAL STAFF    What was the call regarding: PATIENT CALLED WANTING TO KNOW IF SHE NEEDS TO HAVE LABS BEFORE HER APPOINTMENT ON 1/9/23 AND IF THEY ARE GOING TO BE FASTING LABS.    PATIENT WOULD ALSO LIKE TO KNOW IF HER NEUROSURGERY REFERRAL WILL BE CHANGED DUE TO HER CHANGE OF INSURANCE TO [employer].    Do you require a callback: YES

## 2023-01-08 NOTE — PATIENT INSTRUCTIONS
Cooking With Less Salt  Cooking with less salt is one way to reduce the amount of sodium you get from food. Sodium is one of the elements that make up salt. It is found naturally in foods and is also added to certain foods. Depending on your condition and overall health, your health care provider or dietitian may recommend that you reduce your sodium intake. Most people should have less than 2,300 milligrams (mg) of sodium each day. If you have high blood pressure (hypertension), you may need to limit your sodium to 1,500 mg each day. Follow the tips below to help reduce your sodium intake.  What are tips for eating less sodium?  Reading food labels       Check the food label before buying or using packaged ingredients. Always check the label for the serving size and sodium content.  Look for products with no more than 140 mg of sodium in one serving.  Check the % Daily Value column to see what percent of the daily recommended amount of sodium is provided in one serving of the product. Foods with 5% or less in this column are considered low in sodium. Foods with 20% or higher are considered high in sodium.  Do not choose foods with salt as one of the first three ingredients on the ingredients list. If salt is one of the first three ingredients, it usually means the item is high in sodium.     Shopping  Buy sodium-free or low-sodium products. Look for the following words on food labels:  Low-sodium.  Sodium-free.  Reduced-sodium.  No salt added.  Unsalted.  Always check the sodium content even if foods are labeled as low-sodium or no salt added.  Buy fresh foods.  Cooking  Use herbs, seasonings without salt, and spices as substitutes for salt.  Use sodium-free baking soda when baking.  [employer], braise, or roast foods to add flavor with less salt.  Avoid adding salt to pasta, rice, or hot cereals.  Drain and rinse canned vegetables, beans, and meat before use.  Avoid adding salt when cooking sweets and desserts.  Cook  with low-sodium ingredients.  What foods are high in sodium?  Vegetables  Regular canned vegetables (not low-sodium or reduced-sodium). Sauerkraut, pickled vegetables, and relishes. Olives. French fries. Onion rings. Regular canned tomato sauce and paste. Regular tomato and vegetable juice. Frozen vegetables in sauces.  Grains  Instant hot cereals. Bread stuffing, pancake, and biscuit mixes. Croutons. Seasoned rice or pasta mixes. Noodle soup cups. Boxed or frozen macaroni and cheese. Regular salted crackers. Self-rising flour. Rolls. Bagels. Flour tortillas and wraps.  Meats and other proteins  Meat or fish that is salted, canned, smoked, cured, spiced, or pickled. This includes mancia, ham, sausages, hot dogs, corned beef, chipped beef, meat loaves, salt pork, jerky, pickled herring, anchovies, regular canned tuna, and sardines. Salted nuts.  Dairy  Processed cheese and cheese spreads. Cheese curds. Blue cheese. Feta cheese. String cheese. Regular cottage cheese. Buttermilk. Canned milk.  The items listed above may not be a complete list of foods high in sodium. Actual amounts of sodium may be different depending on processing. Contact a dietitian for more information.  What foods are low in sodium?  Fruits  Fresh, frozen, or canned fruit with no sauce added. Fruit juice.  Vegetables  Fresh or frozen vegetables with no sauce added. \"No salt added\" canned vegetables. \"No salt added\" tomato sauce and paste. Low-sodium or reduced-sodium tomato and vegetable juice.  Grains  Noodles, pasta, quinoa, rice. Shredded or puffed wheat or puffed rice. Regular or quick oats (not instant). Low-sodium crackers. Low-sodium bread. Whole-grain bread and whole-grain pasta. Unsalted popcorn.  Meats and other proteins  Fresh or frozen whole meats, poultry (not injected with sodium), and fish with no sauce added. Unsalted nuts. Dried peas, beans, and lentils without added salt. Unsalted canned beans. Eggs. Unsalted nut butters.  Low-sodium canned tuna or chicken.  Dairy  Milk. Soy milk. Yogurt. Low-sodium cheeses, such as Swiss, [place], mozzarella, and ricotta. Sherbet or ice cream (keep to ½ cup per serving). Cream cheese.  Fats and oils  Unsalted butter or margarine.  Other foods  Homemade pudding. Sodium-free baking soda and baking powder. Herbs and spices. Low-sodium seasoning mixes.  Beverages  Coffee and tea. Carbonated beverages.  The items listed above may not be a complete list of foods low in sodium. Actual amounts of sodium may be different depending on processing. Contact a dietitian for more information.  What are some salt alternatives when cooking?  The following are herbs, seasonings, and spices that can be used instead of salt to flavor your food. Herbs should be fresh or dried. Do not choose packaged mixes. Next to the name of the herb, spice, or seasoning are some examples of foods you can pair it with.  Herbs  Bay leaves - Soups, meat and vegetable dishes, and spaghetti sauce.  Basil - Italian dishes, soups, pasta, and fish dishes.  Cilantro - Meat, poultry, and vegetable dishes.  Chili powder - Marinades and Mexican dishes.  Chives - Salad dressings and potato dishes.  Cumin - Mexican dishes, couscous, and meat dishes.  Dill - Fish dishes, sauces, and salads.  Fennel - Meat and vegetable dishes, breads, and cookies.  Garlic (do not use garlic salt) - Italian dishes, meat dishes, salad dressings, and sauces.  Marjoram - Soups, potato dishes, and meat dishes.  Oregano - Pizza and spaghetti sauce.  Parsley - Salads, soups, pasta, and meat dishes.  Ana - Italian dishes, salad dressings, soups, and red meats.  Saffron - Fish dishes, pasta, and some poultry dishes.  Eddi - Stuffings and sauces.  Tarragon - Fish and poultry dishes.  Thyme - Stuffing, meat, and fish dishes.  Seasonings  Lemon juice - Fish dishes, poultry dishes, vegetables, and salads.  Vinegar - Salad dressings, vegetables, and fish  dishes.  Spices  Cinnamon - Sweet dishes, such as cakes, cookies, and puddings.  Cloves - Gingerbread, puddings, and marinades for meats.  Varela - Vegetable dishes, fish and poultry dishes, and stir-croft dishes.  Yolanda - Vegetable dishes, fish dishes, and stir-croft dishes.  Nutmeg - Pasta, vegetables, poultry, fish dishes, and custard.  Summary  Cooking with less salt is one way to reduce the amount of sodium that you get from food.  Buy sodium-free or low-sodium products.  Check the food label before using or buying packaged ingredients.  Use herbs, seasonings without salt, and spices as substitutes for salt in foods.  This information is not intended to replace advice given to you by your health care provider. Make sure you discuss any questions you have with your health care provider.  Document Revised: 12/09/2020 Document Reviewed: 12/09/2020  [employer] Patient Education © 2021 [employer]      https://www.nhlbi.nih.gov/files/docs/public/heart/dash_brief.pdf\">   DASH Eating Plan  DASH stands for Dietary Approaches to Stop Hypertension. The DASH eating plan is a healthy eating plan that has been shown to:  Reduce high blood pressure (hypertension).  Reduce your risk for type 2 diabetes, heart disease, and stroke.  Help with weight loss.  What are tips for following this plan?  Reading food labels  Check food labels for the amount of salt (sodium) per serving. Choose foods with less than 5 percent of the Daily Value of sodium. Generally, foods with less than 300 milligrams (mg) of sodium per serving fit into this eating plan.  To find whole grains, look for the word \"whole\" as the first word in the ingredient list.  Shopping  Buy products labeled as \"low-sodium\" or \"no salt added.\"  Buy fresh foods. Avoid canned foods and pre-made or frozen meals.  Cooking  Avoid adding salt when cooking. Use salt-free seasonings or herbs instead of table salt or sea salt. Check with your health care provider or pharmacist before  using salt substitutes.  Do not croft foods. Cook foods using healthy methods such as baking, boiling, grilling, roasting, and broiling instead.  Cook with heart-healthy oils, such as olive, canola, avocado, soybean, or sunflower oil.  Meal planning       Eat a balanced diet that includes:  4 or more servings of fruits and 4 or more servings of vegetables each day. Try to fill one-half of your plate with fruits and vegetables.  6-8 servings of whole grains each day.  Less than 6 oz (170 g) of lean meat, poultry, or fish each day. A 3-oz (85-g) serving of meat is about the same size as a deck of cards. One egg equals 1 oz (28 g).  2-3 servings of low-fat dairy each day. One serving is 1 cup (237 mL).  1 serving of nuts, seeds, or beans 5 times each week.  2-3 servings of heart-healthy fats. Healthy fats called omega-3 fatty acids are found in foods such as walnuts, flaxseeds, fortified milks, and eggs. These fats are also found in cold-water fish, such as sardines, salmon, and mackerel.  Limit how much you eat of:  Canned or prepackaged foods.  Food that is high in trans fat, such as some fried foods.  Food that is high in saturated fat, such as fatty meat.  Desserts and other sweets, sugary drinks, and other foods with added sugar.  Full-fat dairy products.  Do not salt foods before eating.  Do not eat more than 4 egg yolks a week.  Try to eat at least 2 vegetarian meals a week.  Eat more home-cooked food and less restaurant, buffet, and fast food.     Lifestyle  When eating at a restaurant, ask that your food be prepared with less salt or no salt, if possible.  If you drink alcohol:  Limit how much you use to:  0-1 drink a day for women who are not pregnant.  0-2 drinks a day for men.  Be aware of how much alcohol is in your drink. In the [place] one drink equals one 12 oz bottle of beer (355 mL), one 5 oz glass of wine (148 mL), or one 1½ oz glass of hard liquor (44 mL).  General information  Avoid eating more than  2,300 mg of salt a day. If you have hypertension, you may need to reduce your sodium intake to 1,500 mg a day.  Work with your health care provider to maintain a healthy body weight or to lose weight. Ask what an ideal weight is for you.  Get at least 30 minutes of exercise that causes your heart to beat faster (aerobic exercise) most days of the week. Activities may include walking, swimming, or biking.  Work with your health care provider or dietitian to adjust your eating plan to your individual calorie needs.  What foods should I eat?  Fruits  All fresh, dried, or frozen fruit. Canned fruit in natural juice (without added sugar).  Vegetables  Fresh or frozen vegetables (raw, steamed, roasted, or grilled). Low-sodium or reduced-sodium tomato and vegetable juice. Low-sodium or reduced-sodium tomato sauce and tomato paste. Low-sodium or reduced-sodium canned vegetables.  Grains  Whole-grain or whole-wheat bread. Whole-grain or whole-wheat pasta. Brown rice. Oatmeal. Quinoa. Bulgur. Whole-grain and low-sodium cereals. Rosey bread. Low-fat, low-sodium crackers. Whole-wheat flour tortillas.  Meats and other proteins  Skinless chicken or turkey. Ground chicken or turkey. Pork with fat trimmed off. Fish and seafood. Egg whites. Dried beans, peas, or lentils. Unsalted nuts, nut butters, and seeds. Unsalted canned beans. Lean cuts of beef with fat trimmed off. Low-sodium, lean precooked or cured meat, such as sausages or meat loaves.  Dairy  Low-fat (1%) or fat-free (skim) milk. Reduced-fat, low-fat, or fat-free cheeses. Nonfat, low-sodium ricotta or cottage cheese. Low-fat or nonfat yogurt. Low-fat, low-sodium cheese.  Fats and oils  Soft margarine without trans fats. Vegetable oil. Reduced-fat, low-fat, or light mayonnaise and salad dressings (reduced-sodium). Canola, safflower, olive, avocado, soybean, and sunflower oils. Avocado.  Seasonings and condiments  Herbs. Spices. Seasoning mixes without salt.  Other  foods  Unsalted popcorn and pretzels. Fat-free sweets.  The items listed above may not be a complete list of foods and beverages you can eat. Contact a dietitian for more information.  What foods should I avoid?  Fruits  Canned fruit in a light or heavy syrup. Fried fruit. Fruit in cream or butter sauce.  Vegetables  Creamed or fried vegetables. Vegetables in a cheese sauce. Regular canned vegetables (not low-sodium or reduced-sodium). Regular canned tomato sauce and paste (not low-sodium or reduced-sodium). Regular tomato and vegetable juice (not low-sodium or reduced-sodium). Pickles. Olives.  Grains  Baked goods made with fat, such as croissants, muffins, or some breads. Dry pasta or rice meal packs.  Meats and other proteins  Fatty cuts of meat. Ribs. Fried meat. [place]. Bologna, salami, and other precooked or cured meats, such as sausages or meat loaves. Fat from the back of a pig (fatback). Bratwurst. Salted nuts and seeds. Canned beans with added salt. Canned or smoked fish. Whole eggs or egg yolks. Chicken or turkey with skin.  Dairy  Whole or 2% milk, cream, and half-and-half. Whole or full-fat cream cheese. Whole-fat or sweetened yogurt. Full-fat cheese. Nondairy creamers. Whipped toppings. Processed cheese and cheese spreads.  Fats and oils  Butter. Stick margarine. Lard. Shortening. Ghee. [place]osta fat. Tropical oils, such as coconut, palm kernel, or palm oil.  Seasonings and condiments  Onion salt, garlic salt, seasoned salt, table salt, and sea salt. Worcestershire sauce. Tartar sauce. Barbecue sauce. Teriyaki sauce. Soy sauce, including reduced-sodium. Steak sauce. Canned and packaged gravies. Fish sauce. Oyster sauce. Cocktail sauce. Store-bought horseradish. Ketchup. Mustard. Meat flavorings and tenderizers. Bouillon cubes. Hot sauces. Pre-made or packaged marinades. Pre-made or packaged taco seasonings. Relishes. Regular salad dressings.  Other foods  Salted popcorn and pretzels.  The items listed above  may not be a complete list of foods and beverages you should avoid. Contact a dietitian for more information.  Where to find more information  National Heart, Lung, and [facility] [place]: www.nhlbi.nih.gov  American Heart Association: www.heart.org  Academy of Nutrition and Dietetics: www.eatright.org  National Kidney Foundation: www.kidney.org  Summary  The DASH eating plan is a healthy eating plan that has been shown to reduce high blood pressure (hypertension). It may also reduce your risk for type 2 diabetes, heart disease, and stroke.  When on the DASH eating plan, aim to eat more fresh fruits and vegetables, whole grains, lean proteins, low-fat dairy, and heart-healthy fats.  With the DASH eating plan, you should limit salt (sodium) intake to 2,300 mg a day. If you have hypertension, you may need to reduce your sodium intake to 1,500 mg a day.  Work with your health care provider or dietitian to adjust your eating plan to your individual calorie needs.  This information is not intended to replace advice given to you by your health care provider. Make sure you discuss any questions you have with your health care provider.  Document Revised: 11/20/2020 Document Reviewed: 11/20/2020  [employer] Patient Education © 2021 [employer].       Heart-Healthy Eating Plan  Heart-healthy meal planning includes:  Eating less unhealthy fats.  Eating more healthy fats.  Making other changes in your diet.  Talk with your doctor or a diet specialist (dietitian) to create an eating plan that is right for you.  What is my plan?  Your doctor may recommend an eating plan that includes:  Total fat: ______% or less of total calories a day.  Saturated fat: ______% or less of total calories a day.  Cholesterol: less than _________mg a day.  What are tips for following this plan?  Cooking  Avoid frying your food. Try to bake, boil, grill, or broil it instead. You can also reduce fat by:  Removing the skin from poultry.  Removing all  visible fats from meats.  Steaming vegetables in water or broth.  Meal planning       At meals, divide your plate into four equal parts:  Fill one-half of your plate with vegetables and green salads.  Fill one-fourth of your plate with whole grains.  Fill one-fourth of your plate with lean protein foods.  Eat 4-5 servings of vegetables per day. A serving of vegetables is:  1 cup of raw or cooked vegetables.  2 cups of raw leafy greens.  Eat 4-5 servings of fruit per day. A serving of fruit is:  1 medium whole fruit.  ¼ cup of dried fruit.  ½ cup of fresh, frozen, or canned fruit.  ½ cup of 100% fruit juice.  Eat more foods that have soluble fiber. These are apples, broccoli, carrots, beans, peas, and barley. Try to get 20-30 g of fiber per day.  Eat 4-5 servings of nuts, legumes, and seeds per week:  1 serving of dried beans or legumes equals ½ cup after being cooked.  1 serving of nuts is ¼ cup.  1 serving of seeds equals 1 tablespoon.     General information  Eat more home-cooked food. Eat less restaurant, buffet, and fast food.  Limit or avoid alcohol.  Limit foods that are high in starch and sugar.  Avoid fried foods.  Lose weight if you are overweight.  Keep track of how much salt (sodium) you eat. This is important if you have high blood pressure. Ask your doctor to tell you more about this.  Try to add vegetarian meals each week.  Fats  Choose healthy fats. These include olive oil and canola oil, flaxseeds, walnuts, almonds, and seeds.  Eat more omega-3 fats. These include salmon, mackerel, sardines, tuna, flaxseed oil, and ground flaxseeds. Try to eat fish at least 2 times each week.  Check food labels. Avoid foods with trans fats or high amounts of saturated fat.  Limit saturated fats.  These are often found in animal products, such as meats, butter, and cream.  These are also found in plant foods, such as palm oil, palm kernel oil, and coconut oil.  Avoid foods with partially hydrogenated oils in them.  These have trans fats. Examples are stick margarine, some tub margarines, cookies, crackers, and other baked goods.  What foods can I eat?  Fruits  All fresh, canned (in natural juice), or frozen fruits.  Vegetables  Fresh or frozen vegetables (raw, steamed, roasted, or grilled). Green salads.  Grains  Most grains. Choose whole wheat and whole grains most of the time. Rice and pasta, including brown rice and pastas made with whole wheat.  Meats and other proteins  Lean, well-trimmed beef, veal, pork, and lamb. Chicken and turkey without skin. All fish and shellfish. Wild duck, rabbit, pheasant, and venison. Egg whites or low-cholesterol egg substitutes. Dried beans, peas, lentils, and tofu. Seeds and most nuts.  Dairy  Low-fat or nonfat cheeses, including ricotta and mozzarella. Skim or 1% milk that is liquid, powdered, or evaporated. Buttermilk that is made with low-fat milk. Nonfat or low-fat yogurt.  Fats and oils  Non-hydrogenated (trans-free) margarines. Vegetable oils, including soybean, sesame, sunflower, olive, peanut, safflower, corn, canola, and cottonseed. Salad dressings or mayonnaise made with a vegetable oil.  Beverages  Mineral water. Coffee and tea. Diet carbonated beverages.  Sweets and desserts  Sherbet, gelatin, and fruit ice. Small amounts of dark chocolate.  Limit all sweets and desserts.  Seasonings and condiments  All seasonings and condiments.  The items listed above may not be a complete list of foods and drinks you can eat. Contact a dietitian for more options.  What foods should I avoid?  Fruits  Canned fruit in heavy syrup. Fruit in cream or butter sauce. Fried fruit. Limit coconut.  Vegetables  Vegetables cooked in cheese, cream, or butter sauce. Fried vegetables.  Grains  Breads that are made with saturated or trans fats, oils, or whole milk. Croissants. Sweet rolls. Donuts. High-fat crackers, such as cheese crackers.  Meats and other proteins  Fatty meats, such as hot dogs, ribs,  sausage, mancia, rib-eye roast or steak. High-fat deli meats, such as salami and bologna. Caviar. Domestic duck and goose. Organ meats, such as liver.  Dairy  Cream, sour cream, cream cheese, and creamed cottage cheese. Whole-milk cheeses. Whole or 2% milk that is liquid, evaporated, or condensed. Whole buttermilk. Cream sauce or high-fat cheese sauce. Yogurt that is made from whole milk.  Fats and oils  Meat fat, or shortening. Cocoa butter, hydrogenated oils, palm oil, coconut oil, palm kernel oil. Solid fats and shortenings, including mancia fat, salt pork, lard, and butter. Nondairy cream substitutes. Salad dressings with cheese or sour cream.  Beverages  Regular sodas and juice drinks with added sugar.  Sweets and desserts  Frosting. Pudding. Cookies. Cakes. Pies. Milk chocolate or white chocolate. Buttered syrups. Full-fat ice cream or ice cream drinks.  The items listed above may not be a complete list of foods and drinks to avoid. Contact a dietitian for more information.  Summary  Heart-healthy meal planning includes eating less unhealthy fats, eating more healthy fats, and making other changes in your diet.  Eat a balanced diet. This includes fruits and vegetables, low-fat or nonfat dairy, lean protein, nuts and legumes, whole grains, and heart-healthy oils and fats.  This information is not intended to replace advice given to you by your health care provider. Make sure you discuss any questions you have with your health care provider.  Document Revised: 02/21/2019 Document Reviewed: 01/25/2019  [employer]vier Patient Education © 2021 [employer].       Mediterranean Diet  A Mediterranean diet refers to food and lifestyle choices that are based on the traditions of countries located on the [place]. This way of eating has been shown to help prevent certain conditions and improve outcomes for people who have chronic diseases, like kidney disease and heart disease.  What are tips for following this  plan?  Lifestyle  Cook and eat meals together with your family, when possible.  Drink enough fluid to keep your urine clear or pale yellow.  Be physically active every day. This includes:  Aerobic exercise like running or swimming.  Leisure activities like gardening, walking, or housework.  Get 7-8 hours of sleep each night.  If recommended by your health care provider, drink red wine in moderation. This means 1 glass a day for nonpregnant women and 2 glasses a day for men. A glass of wine equals 5 oz (150 mL).  Reading food labels       Check the serving size of packaged foods. For foods such as rice and pasta, the serving size refers to the amount of cooked product, not dry.  Check the total fat in packaged foods. Avoid foods that have saturated fat or trans fats.  Check the ingredients list for added sugars, such as corn syrup.     Shopping  At the grocery store, buy most of your food from the areas near the walls of the store. This includes:  Fresh fruits and vegetables (produce).  Grains, beans, nuts, and seeds. Some of these may be available in unpackaged forms or large amounts (in bulk).  Fresh seafood.  Poultry and eggs.  Low-fat dairy products.  Buy whole ingredients instead of prepackaged foods.  Buy fresh fruits and vegetables in-season from local farmers markets.  Buy frozen fruits and vegetables in resealable bags.  If you do not have access to quality fresh seafood, buy precooked frozen shrimp or canned fish, such as tuna, salmon, or sardines.  Buy small amounts of raw or cooked vegetables, salads, or olives from the deli or salad bar at your store.  Stock your pantry so you always have certain foods on hand, such as olive oil, canned tuna, canned tomatoes, rice, pasta, and beans.  Cooking  Cook foods with extra-virgin olive oil instead of using butter or other vegetable oils.  Have meat as a side dish, and have vegetables or grains as your main dish. This means having meat in small portions or adding  small amounts of meat to foods like pasta or stew.  Use beans or vegetables instead of meat in common dishes like chili or lasagna.  [place] with different cooking methods. Try roasting or broiling vegetables instead of steaming or sautéeing them.  Add frozen vegetables to soups, stews, pasta, or rice.  Add nuts or seeds for added healthy fat at each meal. You can add these to yogurt, salads, or vegetable dishes.  Marinate fish or vegetables using olive oil, lemon juice, garlic, and fresh herbs.  Meal planning       Plan to eat 1 vegetarian meal one day each week. Try to work up to 2 vegetarian meals, if possible.  Eat seafood 2 or more times a week.  Have healthy snacks readily available, such as:  Vegetable sticks with hummus.  Greek yogurt.  Fruit and nut trail mix.  Eat balanced meals throughout the week. This includes:  Fruit: 2-3 servings a day  Vegetables: 4-5 servings a day  Low-fat dairy: 2 servings a day  Fish, poultry, or lean meat: 1 serving a day  Beans and legumes: 2 or more servings a week  Nuts and seeds: 1-2 servings a day  Whole grains: 6-8 servings a day  Extra-virgin olive oil: 3-4 servings a day  Limit red meat and sweets to only a few servings a month     What are my food choices?  Mediterranean diet  Recommended  Grains: Whole-grain pasta. Brown rice. Bulgar wheat. Polenta. Couscous. Whole-wheat bread. Oatmeal. Quinoa.  Vegetables: Artichokes. Beets. Broccoli. Cabbage. Carrots. Eggplant. Green beans. Chard. Kale. Spinach. Onions. Leeks. Peas. Squash. Tomatoes. Peppers. Radishes.  Fruits: Apples. Apricots. Avocado. Berries. Bananas. Cherries. Dates. Figs. Grapes. Pino. Melon. Oranges. Peaches. Plums. Pomegranate.  Meats and other protein foods: Beans. Almonds. Sunflower seeds. Pine nuts. Peanuts. Cod. [place]. Scallops. Shrimp. Tuna. Tilapia. Clams. Oysters. Eggs.  Dairy: Low-fat milk. Cheese. Greek yogurt.  Beverages: Water. Red wine. Herbal tea.  Fats and oils: Extra virgin olive oil.  Avocado oil. Grape seed oil.  Sweets and desserts: Greek yogurt with honey. Baked apples. Poached pears. Trail mix.  Seasoning and other foods: Basil. Cilantro. Coriander. Cumin. Mint. Parsley. [place]. Rosemary. Tarragon. Garlic. Oregano. Thyme. Pepper. Balsalmic vinegar. Tahini. Hummus. Tomato sauce. Olives. Mushrooms.  Limit these  Grains: Prepackaged pasta or rice dishes. Prepackaged cereal with added sugar.  Vegetables: Deep fried potatoes (french fries).  Fruits: Fruit canned in syrup.  Meats and other protein foods: Beef. Pork. Lamb. Poultry with skin. Hot dogs. [place].  Dairy: Ice cream. Sour cream. Whole milk.  Beverages: Juice. Sugar-sweetened soft drinks. Beer. Liquor and spirits.  Fats and oils: Butter. Canola oil. Vegetable oil. Beef fat (tallow). Lard.  Sweets and desserts: Cookies. Cakes. Pies. Candy.  Seasoning and other foods: Mayonnaise. Premade sauces and marinades.  The items listed may not be a complete list. Talk with your dietitian about what dietary choices are right for you.  Summary  The Mediterranean diet includes both food and lifestyle choices.  Eat a variety of fresh fruits and vegetables, beans, nuts, seeds, and whole grains.  Limit the amount of red meat and sweets that you eat.  Talk with your health care provider about whether it is safe for you to drink red wine in moderation. This means 1 glass a day for nonpregnant women and 2 glasses a day for men. A glass of wine equals 5 oz (150 mL).  This information is not intended to replace advice given to you by your health care provider. Make sure you discuss any questions you have with your health care provider.  Document Revised: 08/17/2017 Document Reviewed: 08/10/2017  Elsevier Patient Education © 2020 Elsevier Inc.         Exercising to Stay Healthy  To become healthy and stay healthy, it is recommended that you do moderate-intensity and vigorous-intensity exercise. You can tell that you are exercising at a moderate intensity if your  heart starts beating faster and you start breathing faster but can still hold a conversation. You can tell that you are exercising at a vigorous intensity if you are breathing much harder and faster and cannot hold a conversation while exercising.  Exercising regularly is important. It has many health benefits, such as:  Improving overall fitness, flexibility, and endurance.  Increasing bone density.  Helping with weight control.  Decreasing body fat.  Increasing muscle strength.  Reducing stress and tension.  Improving overall health.  How often should I exercise?  Choose an activity that you enjoy, and set realistic goals. Your health care provider can help you make an activity plan that works for you.  Exercise regularly as told by your health care provider. This may include:  Doing strength training two times a week, such as:  Lifting weights.  Using resistance bands.  Push-ups.  Sit-ups.  Yoga.  Doing a certain intensity of exercise for a given amount of time. Choose from these options:  A total of 150 minutes of moderate-intensity exercise every week.  A total of 75 minutes of vigorous-intensity exercise every week.  A mix of moderate-intensity and vigorous-intensity exercise every week.  Children, pregnant women, people who have not exercised regularly, people who are overweight, and older adults may need to talk with a health care provider about what activities are safe to do. If you have a medical condition, be sure to talk with your health care provider before you start a new exercise program.  What are some exercise ideas?    Moderate-intensity exercise ideas include:  Walking 1 mile (1.6 km) in about 15 minutes.  Biking.  Hiking.  Golfing.  Dancing.  Water aerobics.  Vigorous-intensity exercise ideas include:  Walking 4.5 miles (7.2 km) or more in about 1 hour.  Jogging or running 5 miles (8 km) in about 1 hour.  Biking 10 miles (16.1 km) or more in about 1 hour.  Lap swimming.  Roller-skating or in-line  skating.  Cross-country skiing.  Vigorous competitive sports, such as football, basketball, and soccer.  Jumping rope.  Aerobic dancing.  What are some everyday activities that can help me to get exercise?  Yard work, such as:  Pushing a .  Raking and bagging leaves.  Washing your car.  Pushing a stroller.  Shoveling snow.  Gardening.  Washing windows or floors.  How can I be more active in my day-to-day activities?  Use stairs instead of an elevator.  Take a walk during your lunch break.  If you drive, park your car farther away from your work or school.  If you take public transportation, get off one stop early and walk the rest of the way.  Stand up or walk around during all of your indoor phone calls.  Get up, stretch, and walk around every 30 minutes throughout the day.  Enjoy exercise with a friend. Support to continue exercising will help you keep a regular routine of activity.  What guidelines can I follow while exercising?  Before you start a new exercise program, talk with your health care provider.  Do not exercise so much that you hurt yourself, feel dizzy, or get very short of breath.  Wear comfortable clothes and wear shoes with good support.  Drink plenty of water while you exercise to prevent dehydration or heat stroke.  Work out until your breathing and your heartbeat get faster.  Where to find more information  [place] Department of Health and Human Services: www.hhs.gov  Centers for Disease Control and Prevention (CDC): www.cdc.gov  Summary  Exercising regularly is important. It will improve your overall fitness, flexibility, and endurance.  Regular exercise also will improve your overall health. It can help you control your weight, reduce stress, and improve your bone density.  Do not exercise so much that you hurt yourself, feel dizzy, or get very short of breath.  Before you start a new exercise program, talk with your health care provider.  This information is not intended to replace  advice given to you by your health care provider. Make sure you discuss any questions you have with your health care provider.  Document Revised: 11/30/2018 Document Reviewed: 11/08/2018  Elsevier Patient Education © 2021 [employer]           Mindfulness-Based Stress Reduction  Mindfulness-based stress reduction (MBSR) is a program that helps people learn to practice mindfulness. Mindfulness is the practice of intentionally paying attention to the present moment. It can be learned and practiced through techniques such as education, breathing exercises, meditation, and yoga. MBSR includes several mindfulness techniques in one program.  MBSR works best when you understand the treatment, are willing to try new things, and can commit to spending time practicing what you learn. MBSR training may include learning about:  How your emotions, thoughts, and reactions affect your body.  New ways to respond to things that cause negative thoughts to start (triggers).  How to notice your thoughts and let go of them.  Practicing awareness of everyday things that you normally do without thinking.  The techniques and goals of different types of meditation.  What are the benefits of MBSR?  MBSR can have many benefits, which include helping you to:  Develop self-awareness. This refers to knowing and understanding yourself.  Learn skills and attitudes that help you to participate in your own health care.  Learn new ways to care for yourself.  Be more accepting about how things are, and let things go.  Be less judgmental and approach things with an open mind.  Be patient with yourself and trust yourself more.  MBSR has also been shown to:  Reduce negative emotions, such as depression and anxiety.  Improve memory and focus.  Change how you sense and approach pain.  Boost your body's ability to fight infections.  Help you connect better with other people.  Improve your sense of well-being.  Follow these instructions at home:       Find  a local in-person or online MBSR program.  Set aside some time regularly for mindfulness practice.  Find a mindfulness practice that works best for you. This may include one or more of the following:  Meditation. Meditation involves focusing your mind on a certain thought or activity.  Breathing awareness exercises. These help you to stay present by focusing on your breath.  Body scan. For this practice, you lie down and pay attention to each part of your body from head to toe. You can identify tension and soreness and intentionally relax parts of your body.  Yoga. Yoga involves stretching and breathing, and it can improve your ability to move and be flexible. It can also provide an experience of testing your body's limits, which can help you release stress.  Mindful eating. This way of eating involves focusing on the taste, texture, color, and smell of each bite of food. Because this slows down eating and helps you feel full sooner, it can be an important part of a weight-loss plan.  Find a podcast or recording that provides guidance for breathing awareness, body scan, or meditation exercises. You can listen to these any time when you have a free moment to rest without distractions.  Follow your treatment plan as told by your health care provider. This may include taking regular medicines and making changes to your diet or lifestyle as recommended.  How to practice mindfulness  To do a basic awareness exercise:  Find a comfortable place to sit.  Pay attention to the present moment. Observe your thoughts, feelings, and surroundings just as they are.  Avoid placing judgment on yourself, your feelings, or your surroundings. Make note of any judgment that comes up, and let it go.  Your mind may wander, and that is okay. Make note of when your thoughts drift, and return your attention to the present moment.  To do basic mindfulness meditation:  Find a comfortable place to sit. This may include a stable chair or a firm  floor cushion.  Sit upright with your back straight. Let your arms fall next to your side with your hands resting on your legs.  If sitting in a chair, rest your feet flat on the floor.  If sitting on a cushion, cross your legs in front of you.  Keep your head in a neutral position with your chin dropped slightly. Relax your jaw and rest the tip of your tongue on the roof of your mouth. Drop your gaze to the floor. You can close your eyes if you like.  Breathe normally and pay attention to your breath. Feel the air moving in and out of your nose. Feel your belly expanding and relaxing with each breath.  Your mind may wander, and that is okay. Make note of when your thoughts drift, and return your attention to your breath.  Avoid placing judgment on yourself, your feelings, or your surroundings. Make note of any judgment or feelings that come up, let them go, and bring your attention back to your breath.  When you are ready, lift your gaze or open your eyes. Pay attention to how your body feels after the meditation.  Where to find more information  You can find more information about MBSR from:  Your health care provider.  Community-based meditation centers or programs.  Programs offered near you.  Summary  Mindfulness-based stress reduction (MBSR) is a program that teaches you how to intentionally pay attention to the present moment. It is used with other treatments to help you cope better with daily stress, emotions, and pain.  MBSR focuses on developing self-awareness, which allows you to respond to life stress without judgment or negative emotions.  MBSR programs may involve learning different mindfulness practices, such as breathing exercises, meditation, yoga, body scan, or mindful eating. Find a mindfulness practice that works best for you, and set aside time for it on a regular basis.  This information is not intended to replace advice given to you by your health care provider. Make sure you discuss any  questions you have with your health care provider.  Document Revised: 02/22/2021 Document Reviewed: 04/26/2018  Elsevier Patient Education © 2021 Elsevier Inc.

## 2023-01-09 ENCOUNTER — OFFICE VISIT (OUTPATIENT)
Dept: INTERNAL MEDICINE | Facility: CLINIC | Age: 53
End: 2023-01-09
Payer: COMMERCIAL

## 2023-01-09 VITALS
SYSTOLIC BLOOD PRESSURE: 139 MMHG | WEIGHT: 210 LBS | DIASTOLIC BLOOD PRESSURE: 83 MMHG | BODY MASS INDEX: 35.85 KG/M2 | TEMPERATURE: 97.5 F | HEART RATE: 107 BPM | HEIGHT: 64 IN | OXYGEN SATURATION: 96 %

## 2023-01-09 DIAGNOSIS — M48.02 SPINAL STENOSIS OF CERVICAL REGION: ICD-10-CM

## 2023-01-09 DIAGNOSIS — I10 ESSENTIAL HYPERTENSION: ICD-10-CM

## 2023-01-09 DIAGNOSIS — M50.30 DEGENERATIVE DISC DISEASE, CERVICAL: ICD-10-CM

## 2023-01-09 DIAGNOSIS — Z00.00 ANNUAL PHYSICAL EXAM: Primary | ICD-10-CM

## 2023-01-09 DIAGNOSIS — L71.9 ROSACEA: ICD-10-CM

## 2023-01-09 DIAGNOSIS — L20.9 ATOPIC DERMATITIS, UNSPECIFIED TYPE: ICD-10-CM

## 2023-01-09 DIAGNOSIS — R07.9 CHEST PAIN, UNSPECIFIED TYPE: ICD-10-CM

## 2023-01-09 DIAGNOSIS — M54.2 CERVICALGIA: ICD-10-CM

## 2023-01-09 DIAGNOSIS — F17.211 CIGARETTE NICOTINE DEPENDENCE IN REMISSION: ICD-10-CM

## 2023-01-09 PROCEDURE — 82728 ASSAY OF FERRITIN: CPT | Performed by: STUDENT IN AN ORGANIZED HEALTH CARE EDUCATION/TRAINING PROGRAM

## 2023-01-09 PROCEDURE — 85025 COMPLETE CBC W/AUTO DIFF WBC: CPT | Performed by: STUDENT IN AN ORGANIZED HEALTH CARE EDUCATION/TRAINING PROGRAM

## 2023-01-09 PROCEDURE — 80053 COMPREHEN METABOLIC PANEL: CPT | Performed by: STUDENT IN AN ORGANIZED HEALTH CARE EDUCATION/TRAINING PROGRAM

## 2023-01-09 PROCEDURE — 80061 LIPID PANEL: CPT | Performed by: STUDENT IN AN ORGANIZED HEALTH CARE EDUCATION/TRAINING PROGRAM

## 2023-01-09 PROCEDURE — 84443 ASSAY THYROID STIM HORMONE: CPT | Performed by: STUDENT IN AN ORGANIZED HEALTH CARE EDUCATION/TRAINING PROGRAM

## 2023-01-09 PROCEDURE — 84466 ASSAY OF TRANSFERRIN: CPT | Performed by: STUDENT IN AN ORGANIZED HEALTH CARE EDUCATION/TRAINING PROGRAM

## 2023-01-09 PROCEDURE — 99396 PREV VISIT EST AGE 40-64: CPT | Performed by: STUDENT IN AN ORGANIZED HEALTH CARE EDUCATION/TRAINING PROGRAM

## 2023-01-09 PROCEDURE — 83540 ASSAY OF IRON: CPT | Performed by: STUDENT IN AN ORGANIZED HEALTH CARE EDUCATION/TRAINING PROGRAM

## 2023-01-09 RX ORDER — LOSARTAN POTASSIUM AND HYDROCHLOROTHIAZIDE 12.5; 5 MG/1; MG/1
1 TABLET ORAL DAILY
Qty: 90 TABLET | Refills: 2 | Status: SHIPPED | OUTPATIENT
Start: 2023-01-09

## 2023-01-09 NOTE — PROGRESS NOTES
Chief Complaint  Annual Exam (Referral to different neurosurgeon )    Subjective          Holly Apple presents to [facility]  History of Present Illness    When taking full dose of her combo blood pressure pill, reports had \"heart pain\" in chest.  This quickly resolved after cutting pill in half.  No further episodes of chest pain.  Reports able to climb two flights of steps without chest pain.    Reports would like to see a different neurosurgeon.  Requesting referral to [facility] for this purpose.    Current Outpatient Medications   Medication Instructions   • EPINEPHrine (EPIPEN) 0.3 MG/0.3ML solution auto-injector injection 0.3 mg   • fexofenadine (ALLEGRA) 180 mg, Oral, Nightly   • fluticasone (Flonase) 50 MCG/ACT nasal spray 2 sprays, Nasal, Daily   • losartan-hydrochlorothiazide (Hyzaar) 50-12.5 MG per tablet 1 tablet, Oral, Daily   • metroNIDAZOLE (METROCREAM) 0.75 % cream 1 application, Topical, 2 Times Daily   • triamcinolone (KENALOG) 0.1 % ointment 1 application, Topical, 2 Times Daily       The following portions of the patient's history were reviewed and updated as appropriate: allergies, current medications, past family history, past medical history, past social history, past surgical history, and problem list.    Objective   Vital Signs:   /83   Pulse 107   Temp 97.5 °F (36.4 °C) (Temporal)   Ht 162.6 cm (64\")   Wt 95.3 kg (210 lb)   SpO2 96%   BMI 36.05 kg/m²     Wt Readings from Last 3 Encounters:   01/09/23 95.3 kg (210 lb)   11/30/22 94.6 kg (208 lb 9.6 oz)   10/17/22 94.3 kg (208 lb)     BP Readings from Last 3 Encounters:   01/09/23 139/83   11/30/22 128/82   10/17/22 122/79     Physical Exam  Vitals reviewed.   Constitutional:       General: She is not in acute distress.     Appearance: Normal appearance. She is not ill-appearing, toxic-appearing or diaphoretic.   HENT:      Head: Normocephalic and atraumatic.      Right Ear:  External ear normal.      Left Ear: External ear normal.   Eyes:      Conjunctiva/sclera: Conjunctivae normal.   Cardiovascular:      Rate and Rhythm: Normal rate and regular rhythm.      Pulses: Normal pulses.      Heart sounds: Normal heart sounds. No murmur heard.    No friction rub. No gallop.   Pulmonary:      Effort: Pulmonary effort is normal. No respiratory distress.      Breath sounds: Normal breath sounds. No stridor. No wheezing, rhonchi or rales.   Chest:      Chest wall: No tenderness.   Abdominal:      General: Abdomen is flat.      Palpations: Abdomen is soft. There is no mass.      Tenderness: There is no abdominal tenderness.   Musculoskeletal:      Right lower leg: No edema.      Left lower leg: No edema.   Skin:     General: Skin is warm and dry.      Comments: Erythema noted on bilateral cheeks of face.  Erythematous plaque noted on back of left wrist.   Neurological:      General: No focal deficit present.      Mental Status: She is alert. Mental status is at baseline.   Psychiatric:         Mood and Affect: Mood normal.         Speech: Speech is tangential.         Behavior: Behavior normal.         Thought Content: Thought content normal.         Judgment: Judgment normal.        Result Review :   The following data was reviewed by: Mauri Vital MD on 01/09/2023:  Common labs    Common Labs 7/27/22 7/27/22 7/27/22 8/31/22 9/15/22 9/15/22    1447 1447 1447  1816 1816   Glucose  86  113 (A)  127 (A)   BUN  14  13  18   Creatinine  0.90  0.90  0.88   Sodium  139  136  140   Potassium  4.3  3.6  3.6   Chloride  101  98  100   Calcium  9.7  9.5  10.2   Albumin  4.70  4.50  4.50   Total Bilirubin  0.2  0.3  0.3   Alkaline Phosphatase  72  73  78   AST (SGOT)  24  26  25   ALT (SGPT)  38 (A)  44 (A)  45 (A)   WBC 6.81    6.08    Hemoglobin 13.8    13.6    Hematocrit 42.0    42.1    Platelets 315    267    Total Cholesterol   209 (A)      Triglycerides   154 (A)      HDL Cholesterol   54      LDL  Cholesterol    128 (A)      (A) Abnormal value                   Lab Results   Component Value Date    SARSANTIGEN Not Detected 11/30/2022    COVID19 Not Detected 11/30/2022    RAPFLUA Negative 05/09/2022    RAPFLUB Negative 05/09/2022    FLUAAG Not Detected 11/30/2022    FLUBAG Not Detected 11/30/2022    RAPSCRN Negative 11/30/2022    BILIRUBINUR Negative 10/12/2022       Procedures        Assessment and Plan    Diagnoses and all orders for this visit:    1. Annual physical exam (Primary)  -     CBC & Differential  -     Comprehensive Metabolic Panel  -     TSH  -     Lipid Panel    2. Essential hypertension  -     Comprehensive Metabolic Panel  -     losartan-hydrochlorothiazide (Hyzaar) 50-12.5 MG per tablet; Take 1 tablet by mouth Daily.  Dispense: 90 tablet; Refill: 2    3. Cigarette nicotine dependence in remission    4. Cervicalgia  -     Cancel: Ambulatory Referral to Neurosurgery  -     Ambulatory Referral to Neurosurgery    5. Spinal stenosis of cervical region  -     Cancel: Ambulatory Referral to Neurosurgery  -     Ambulatory Referral to Neurosurgery    6. Degenerative disc disease, cervical  -     Cancel: Ambulatory Referral to Neurosurgery  -     Ambulatory Referral to Neurosurgery    7. Atopic dermatitis, unspecified type  -     triamcinolone (KENALOG) 0.1 % ointment; Apply 1 application topically to the appropriate area as directed 2 (Two) Times a Day.  Dispense: 80 g; Refill: 2    8. Rosacea  -     metroNIDAZOLE (METROCREAM) 0.75 % cream; Apply 1 application topically to the appropriate area as directed 2 (Two) Times a Day.  Dispense: 45 g; Refill: 3    9. Chest pain, unspecified type      Chest pain:  -appears non-cardiac on history, and not related to exertion or relieved by rest  -will monitor for now    Health Maintenance:  -nutritional counseling on the components of a heart healthy diet  -exercise counseling, recommending at least 2.5 hours of moderate exercise weekly      Medications  Discontinued During This Encounter   Medication Reason   • phenol (CHLORASEPTIC) 1.4 % liquid liquid    • ketorolac (TORADOL) 10 MG tablet    • losartan-hydrochlorothiazide (Hyzaar) 100-25 MG per tablet           Follow Up   Return in about 3 months (around 4/9/2023) for HTN.  Patient was given instructions and counseling regarding her condition or for health maintenance advice. Please see specific information pulled into the AVS if appropriate.       aMuri Vital MD  01/09/23  17:36 EST

## 2023-01-10 LAB
ALBUMIN SERPL-MCNC: 4.1 G/DL (ref 3.5–5.2)
ALBUMIN/GLOB SERPL: 1.1 G/DL
ALP SERPL-CCNC: 74 U/L (ref 39–117)
ALT SERPL W P-5'-P-CCNC: 43 U/L (ref 1–33)
ANION GAP SERPL CALCULATED.3IONS-SCNC: 12.4 MMOL/L (ref 5–15)
AST SERPL-CCNC: 21 U/L (ref 1–32)
BASOPHILS # BLD AUTO: 0.05 10*3/MM3 (ref 0–0.2)
BASOPHILS NFR BLD AUTO: 0.7 % (ref 0–1.5)
BILIRUB SERPL-MCNC: 0.3 MG/DL (ref 0–1.2)
BUN SERPL-MCNC: 16 MG/DL (ref 6–20)
BUN/CREAT SERPL: 17.8 (ref 7–25)
CALCIUM SPEC-SCNC: 10.1 MG/DL (ref 8.6–10.5)
CHLORIDE SERPL-SCNC: 96 MMOL/L (ref 98–107)
CHOLEST SERPL-MCNC: 232 MG/DL (ref 0–200)
CO2 SERPL-SCNC: 27.6 MMOL/L (ref 22–29)
CREAT SERPL-MCNC: 0.9 MG/DL (ref 0.57–1)
DEPRECATED RDW RBC AUTO: 36.5 FL (ref 37–54)
EGFRCR SERPLBLD CKD-EPI 2021: 76.1 ML/MIN/1.73
EOSINOPHIL # BLD AUTO: 0.12 10*3/MM3 (ref 0–0.4)
EOSINOPHIL NFR BLD AUTO: 1.6 % (ref 0.3–6.2)
ERYTHROCYTE [DISTWIDTH] IN BLOOD BY AUTOMATED COUNT: 13.5 % (ref 12.3–15.4)
GLOBULIN UR ELPH-MCNC: 3.7 GM/DL
GLUCOSE SERPL-MCNC: 142 MG/DL (ref 65–99)
HCT VFR BLD AUTO: 44.3 % (ref 34–46.6)
HDLC SERPL-MCNC: 52 MG/DL (ref 40–60)
HGB BLD-MCNC: 14.8 G/DL (ref 12–15.9)
LDLC SERPL CALC-MCNC: 144 MG/DL (ref 0–100)
LDLC/HDLC SERPL: 2.7 {RATIO}
LYMPHOCYTES # BLD AUTO: 3.2 10*3/MM3 (ref 0.7–3.1)
LYMPHOCYTES NFR BLD AUTO: 43.2 % (ref 19.6–45.3)
MCH RBC QN AUTO: 25.5 PG (ref 26.6–33)
MCHC RBC AUTO-ENTMCNC: 33.4 G/DL (ref 31.5–35.7)
MCV RBC AUTO: 76.2 FL (ref 79–97)
MONOCYTES # BLD AUTO: 0.58 10*3/MM3 (ref 0.1–0.9)
MONOCYTES NFR BLD AUTO: 7.8 % (ref 5–12)
NEUTROPHILS NFR BLD AUTO: 3.43 10*3/MM3 (ref 1.7–7)
NEUTROPHILS NFR BLD AUTO: 46.4 % (ref 42.7–76)
PLATELET # BLD AUTO: 289 10*3/MM3 (ref 140–450)
PMV BLD AUTO: 8.3 FL (ref 6–12)
POTASSIUM SERPL-SCNC: 3.6 MMOL/L (ref 3.5–5.2)
PROT SERPL-MCNC: 7.8 G/DL (ref 6–8.5)
RBC # BLD AUTO: 5.81 10*6/MM3 (ref 3.77–5.28)
SODIUM SERPL-SCNC: 136 MMOL/L (ref 136–145)
TRIGL SERPL-MCNC: 198 MG/DL (ref 0–150)
TSH SERPL DL<=0.05 MIU/L-ACNC: 3.89 UIU/ML (ref 0.27–4.2)
VLDLC SERPL-MCNC: 36 MG/DL (ref 5–40)
WBC NRBC COR # BLD: 7.4 10*3/MM3 (ref 3.4–10.8)

## 2023-01-11 ENCOUNTER — TELEPHONE (OUTPATIENT)
Dept: INTERNAL MEDICINE | Facility: CLINIC | Age: 53
End: 2023-01-11
Payer: COMMERCIAL

## 2023-01-11 DIAGNOSIS — R71.8 MICROCYTOSIS: Primary | ICD-10-CM

## 2023-01-11 DIAGNOSIS — Z12.11 ENCOUNTER FOR SCREENING COLONOSCOPY: ICD-10-CM

## 2023-01-11 LAB
FERRITIN SERPL-MCNC: 113 NG/ML (ref 13–150)
IRON 24H UR-MRATE: 72 MCG/DL (ref 37–145)
IRON SATN MFR SERPL: 16 % (ref 20–50)
TIBC SERPL-MCNC: 460 MCG/DL (ref 298–536)
TRANSFERRIN SERPL-MCNC: 309 MG/DL (ref 200–360)

## 2023-01-11 NOTE — TELEPHONE ENCOUNTER
----- Message from Mauri Vital MD sent at 1/11/2023  8:16 AM EST -----  CBC notable for white blood cell count, hemoglobin/hematocrit, and platelet count within normal limits.  MCV is low, which is suggestive of iron deficiency.  I have added on some labs to check iron, and those are still pending.  I have also placed a GI referral.  This is because a common cause of low iron after menopause is chronic blood loss in the GI tract, and we really do need to look into this.    CMP notable for blood sugar of 142.  ALT, a liver enzyme, is mildly elevated.    Lipids notable for elevated total and LDL cholesterol.  Triglycerides also elevated.  I would recommend a heart healthy diet.    TSH is within normal limits.

## 2023-01-11 NOTE — TELEPHONE ENCOUNTER
Caller: Holly Apple    Relationship to patient: Self        Patient is needing: PATIENT INFORMED

## 2023-01-11 NOTE — TELEPHONE ENCOUNTER
Attempted to contact patient. Left message to call the office back.    HUB OK TO READ/ADVISE:  CBC notable for white blood cell count, hemoglobin/hematocrit, and platelet count within normal limits.  MCV is low, which is suggestive of iron deficiency.  I have added on some labs to check iron, and those are still pending.  I have also placed a GI referral.  This is because a common cause of low iron after menopause is chronic blood loss in the GI tract, and we really do need to look into this.     CMP notable for blood sugar of 142.  ALT, a liver enzyme, is mildly elevated.     Lipids notable for elevated total and LDL cholesterol.  Triglycerides also elevated.  I would recommend a heart healthy diet.     TSH is within normal limits.

## 2023-01-12 ENCOUNTER — TELEPHONE (OUTPATIENT)
Dept: INTERNAL MEDICINE | Facility: CLINIC | Age: 53
End: 2023-01-12
Payer: COMMERCIAL

## 2023-01-12 NOTE — TELEPHONE ENCOUNTER
Left message to return call.       HUB OK TO READ/ADVISE  ----- Message from Mauri Vital MD sent at 1/12/2023  8:09 AM EST -----  Iron labs are reassuring, and do not suggest iron deficiency.

## 2023-01-12 NOTE — TELEPHONE ENCOUNTER
PATIENT WAS MADE AWARE OF RESULTS VIA THE HUB AND ASKED IF SHE STILL HAD TO SEE GI AND I TOLD HER THAT SHE NEEDS TO AND SHE IS ALSO DUE FOR COLONOSCOPY AND SHE REFUSED AND STATED THAT SHE WILL NOT DO A COLONOSCOPY AND SHE IS MORE WORRIED ABOUT THE REFERRAL FOR HER NECK CAUSE SHE COULD BE PARALYZED AND DIE. I TRANSFERRED THE PATIENT TO THE FRONT TO SPEAK WITH THE REFERRAL TEAM

## 2023-01-12 NOTE — TELEPHONE ENCOUNTER
----- Message from Mauri Vital MD sent at 1/12/2023  8:09 AM EST -----  Iron labs are reassuring, and do not suggest iron deficiency.

## 2023-03-10 ENCOUNTER — OFFICE VISIT (OUTPATIENT)
Dept: INTERNAL MEDICINE | Facility: CLINIC | Age: 53
End: 2023-03-10
Payer: COMMERCIAL

## 2023-03-10 VITALS
HEART RATE: 96 BPM | DIASTOLIC BLOOD PRESSURE: 85 MMHG | BODY MASS INDEX: 35 KG/M2 | TEMPERATURE: 97.8 F | OXYGEN SATURATION: 95 % | HEIGHT: 64 IN | WEIGHT: 205 LBS | SYSTOLIC BLOOD PRESSURE: 127 MMHG

## 2023-03-10 DIAGNOSIS — L03.213 PRESEPTAL CELLULITIS OF RIGHT EYE: ICD-10-CM

## 2023-03-10 DIAGNOSIS — M48.02 SPINAL STENOSIS OF CERVICAL REGION: ICD-10-CM

## 2023-03-10 DIAGNOSIS — I10 ESSENTIAL HYPERTENSION: ICD-10-CM

## 2023-03-10 DIAGNOSIS — Z98.1 HISTORY OF FUSION OF CERVICAL SPINE: ICD-10-CM

## 2023-03-10 DIAGNOSIS — H57.89 EYE SWELLING, RIGHT: Primary | ICD-10-CM

## 2023-03-10 PROBLEM — M47.22 CERVICAL SPONDYLOSIS WITH RADICULOPATHY: Status: ACTIVE | Noted: 2023-02-14

## 2023-03-10 PROBLEM — M50.20 CERVICAL DISC HERNIATION: Status: ACTIVE | Noted: 2023-03-01

## 2023-03-10 PROCEDURE — 3079F DIAST BP 80-89 MM HG: CPT | Performed by: STUDENT IN AN ORGANIZED HEALTH CARE EDUCATION/TRAINING PROGRAM

## 2023-03-10 PROCEDURE — 99214 OFFICE O/P EST MOD 30 MIN: CPT | Performed by: STUDENT IN AN ORGANIZED HEALTH CARE EDUCATION/TRAINING PROGRAM

## 2023-03-10 PROCEDURE — 3074F SYST BP LT 130 MM HG: CPT | Performed by: STUDENT IN AN ORGANIZED HEALTH CARE EDUCATION/TRAINING PROGRAM

## 2023-03-10 RX ORDER — HYDROCODONE BITARTRATE AND ACETAMINOPHEN 5; 325 MG/1; MG/1
1 TABLET ORAL EVERY 4 HOURS PRN
COMMUNITY
Start: 2023-03-02

## 2023-03-10 RX ORDER — DOCUSATE SODIUM -SENNOSIDES 50; 8.6 MG/1; MG/1
1 TABLET, COATED ORAL DAILY
COMMUNITY
Start: 2023-03-02

## 2023-03-10 RX ORDER — AMOXICILLIN AND CLAVULANATE POTASSIUM 875; 125 MG/1; MG/1
1 TABLET, FILM COATED ORAL 2 TIMES DAILY
Qty: 20 TABLET | Refills: 0 | Status: SHIPPED | OUTPATIENT
Start: 2023-03-10 | End: 2023-03-20

## 2023-03-10 RX ORDER — ACETAMINOPHEN 500 MG
1000 TABLET ORAL EVERY 6 HOURS PRN
COMMUNITY

## 2023-03-10 RX ORDER — CYCLOBENZAPRINE HCL 10 MG
10 TABLET ORAL EVERY 8 HOURS PRN
COMMUNITY
Start: 2023-03-02

## 2023-03-10 RX ORDER — MELATONIN
1000 DAILY
COMMUNITY

## 2023-03-10 NOTE — PROGRESS NOTES
"Chief Complaint  Eye Problem (Right eye, swelling ) and Skin Problem (Patient's skin is really dry. Patient feels like she can't breath because she feels dry. )    Subjective          Holly Apple presents to [facility]  History of Present Illness    Patient does not want to be swabbed for influenza or covid at this time.     March 2nd had neurosurgery (ACDF C5-C6, C7-C7 with plate placement C5-C7).  Since then, reports has had right eye swelling and pressure around her right eye.  She reports no blurry vision, no diplopia, no issues with moving eyes.  She denies fever.    Was given hydrocodone and flexeril after surgery, but stopped both 2 days ago as felt \"nikko like a zombie.\"  She reports she did not drive here.    Current Outpatient Medications   Medication Instructions   • acetaminophen (TYLENOL) 1,000 mg, Oral, Every 6 Hours PRN   • amoxicillin-clavulanate (Augmentin) 875-125 MG per tablet 1 tablet, Oral, 2 Times Daily   • cholecalciferol (VITAMIN D3) 1,000 Units, Oral, Daily   • cyclobenzaprine (FLEXERIL) 10 mg, Oral, Every 8 Hours PRN   • EPINEPHrine (EPIPEN) 0.3 MG/0.3ML solution auto-injector injection 0.3 mg   • fexofenadine (ALLEGRA) 180 mg, Oral, Nightly   • fluticasone (Flonase) 50 MCG/ACT nasal spray 2 sprays, Nasal, Daily   • HYDROcodone-acetaminophen (NORCO) 5-325 MG per tablet 1 tablet, Oral, Every 4 Hours PRN   • losartan-hydrochlorothiazide (Hyzaar) 50-12.5 MG per tablet 1 tablet, Oral, Daily   • metroNIDAZOLE (METROCREAM) 0.75 % cream 1 application, Topical, 2 Times Daily   • Senexon-S 8.6-50 MG per tablet 1 tablet, Oral, Daily   • triamcinolone (KENALOG) 0.1 % ointment 1 application, Topical, 2 Times Daily       The following portions of the patient's history were reviewed and updated as appropriate: allergies, current medications, past family history, past medical history, past social history, past surgical history, and problem " "list.    Objective   Vital Signs:   /85 (BP Location: Left arm, Patient Position: Sitting, Cuff Size: Large Adult)   Pulse 96   Temp 97.8 °F (36.6 °C) (Temporal)   Ht 162.6 cm (64\")   Wt 93 kg (205 lb)   SpO2 95%   BMI 35.19 kg/m²     Wt Readings from Last 3 Encounters:   03/10/23 93 kg (205 lb)   01/09/23 95.3 kg (210 lb)   11/30/22 94.6 kg (208 lb 9.6 oz)     BP Readings from Last 3 Encounters:   03/10/23 127/85   01/09/23 139/83   11/30/22 128/82     Physical Exam  Vitals reviewed.   Constitutional:       General: She is not in acute distress.     Appearance: Normal appearance. She is not ill-appearing, toxic-appearing or diaphoretic.   HENT:      Head: Normocephalic and atraumatic.      Right Ear: External ear normal.      Left Ear: External ear normal.   Eyes:      Extraocular Movements: Extraocular movements intact.      Pupils: Pupils are equal, round, and reactive to light.      Comments: Mild injection, sclera of right eye.  Very mild edema noted right upper and lower eyelid.  No ecchymoses, no erythema, no induration   Cardiovascular:      Rate and Rhythm: Normal rate and regular rhythm.      Pulses: Normal pulses.      Heart sounds: Normal heart sounds. No murmur heard.    No friction rub. No gallop.   Pulmonary:      Effort: Pulmonary effort is normal. No respiratory distress.      Breath sounds: Normal breath sounds. No stridor. No wheezing, rhonchi or rales.   Chest:      Chest wall: No tenderness.   Abdominal:      General: Abdomen is flat.      Palpations: Abdomen is soft. There is no mass.      Tenderness: There is no abdominal tenderness.   Musculoskeletal:      Right lower leg: No edema.      Left lower leg: No edema.   Skin:     General: Skin is warm and dry.      Comments: Right frontal neck surgical site evaluated.  Very mild erythema noted around surgical site.  No induration, no discharge.  Overall, reassuring exam of surgical site.   Neurological:      General: No focal deficit " present.      Mental Status: She is alert. Mental status is at baseline.   Psychiatric:         Mood and Affect: Mood normal.         Behavior: Behavior normal.         Thought Content: Thought content normal.         Judgment: Judgment normal.       Result Review :   The following data was reviewed by: Mauri Vital MD on 03/10/2023:  Common labs    Common Labs 9/15/22 9/15/22 1/9/23 1/9/23 1/9/23 3/1/23    1816 1816 1654 1654 1654    Glucose  127 (A) 142 (A)      BUN  18 16      Creatinine  0.88 0.90      Sodium  140 136      Potassium  3.6 3.6   3.9   Chloride  100 96 (A)      Calcium  10.2 10.1      Albumin  4.50 4.1      Total Bilirubin  0.3 0.3      Alkaline Phosphatase  78 74      AST (SGOT)  25 21      ALT (SGPT)  45 (A) 43 (A)      WBC 6.08    7.40    Hemoglobin 13.6    14.8    Hematocrit 42.1    44.3    Platelets 267    289    Total Cholesterol    232 (A)     Triglycerides    198 (A)     HDL Cholesterol    52     LDL Cholesterol     144 (A)     (A) Abnormal value                   Lab Results   Component Value Date    SARSANTIGEN Not Detected 11/30/2022    COVID19 Not Detected 11/30/2022    RAPFLUA Negative 05/09/2022    RAPFLUB Negative 05/09/2022    FLUAAG Not Detected 11/30/2022    FLUBAG Not Detected 11/30/2022    RAPSCRN Negative 11/30/2022    INR 1.0 02/27/2023    BILIRUBINUR Negative 10/12/2022       Procedures        Assessment and Plan    Diagnoses and all orders for this visit:    1. Eye swelling, right (Primary)    2. Essential hypertension    3. Spinal stenosis of cervical region    4. Preseptal cellulitis of right eye  -     amoxicillin-clavulanate (Augmentin) 875-125 MG per tablet; Take 1 tablet by mouth 2 (Two) Times a Day for 10 days.  Dispense: 20 tablet; Refill: 0    5. History of fusion of cervical spine      Preseptal cellulitis:  -counseled on ED parameters: worsening pain or swelling, diplopia or blurry vision, eye pain, or worsening in other way  -reviewed how to get in touch with  on call provider in case of questions or issues  -have sent antibiotic      There are no discontinued medications.       Follow Up   Return if symptoms worsen or fail to improve.  Patient was given instructions and counseling regarding her condition or for health maintenance advice. Please see specific information pulled into the AVS if appropriate.       Mauri Vital MD  03/10/23  18:53 EST

## 2023-03-23 ENCOUNTER — OFFICE VISIT (OUTPATIENT)
Dept: INTERNAL MEDICINE | Facility: CLINIC | Age: 53
End: 2023-03-23
Payer: COMMERCIAL

## 2023-03-23 VITALS
HEART RATE: 104 BPM | HEIGHT: 64 IN | TEMPERATURE: 97.5 F | WEIGHT: 206.2 LBS | DIASTOLIC BLOOD PRESSURE: 79 MMHG | OXYGEN SATURATION: 97 % | BODY MASS INDEX: 35.2 KG/M2 | SYSTOLIC BLOOD PRESSURE: 121 MMHG

## 2023-03-23 DIAGNOSIS — D22.9 NEVUS: ICD-10-CM

## 2023-03-23 DIAGNOSIS — H57.89 EYE SWELLING, RIGHT: ICD-10-CM

## 2023-03-23 DIAGNOSIS — I10 ESSENTIAL HYPERTENSION: Primary | ICD-10-CM

## 2023-03-23 RX ORDER — METHYLPREDNISOLONE 4 MG/1
TABLET ORAL
Qty: 21 TABLET | Refills: 0 | Status: SHIPPED | OUTPATIENT
Start: 2023-03-23

## 2023-03-23 RX ORDER — EPINEPHRINE 0.3 MG/.3ML
0.3 INJECTION SUBCUTANEOUS ONCE AS NEEDED
Qty: 1 EACH | Refills: 2 | Status: SHIPPED | OUTPATIENT
Start: 2023-03-23

## 2023-03-23 NOTE — PROGRESS NOTES
Chief Complaint  Eye Problem (LEFT EYE-RED, SWOLLEN -STARTED AFTER NECK SURGERY, PATIENT THINKS IT COULD BE A REACTION TO ADHESIVE ) and MOLE (ON BACK, WOULD LIKE IT LOOKED AT/)    Subjective          Holly Apple presents to [facility]  History of Present Illness    Thinks had allergic reaction to adhesive used during surgical procedure earlier this motnh.  Redness has improved, but reports that swelling around her right eye is still a problem.  No swelling present at clinic, but reports that it gets worse later in the day.  No blurry vision.  She requests steroid burst, and is amenable to ophthalmology referral.    She reports that she had a mole on her back, for which she'd like to be referred to derm.    Current Outpatient Medications   Medication Instructions   • acetaminophen (TYLENOL) 1,000 mg, Oral, Every 6 Hours PRN   • cholecalciferol (VITAMIN D3) 1,000 Units, Oral, Daily   • cyclobenzaprine (FLEXERIL) 10 mg, Every 8 Hours PRN   • EPINEPHrine (EPIPEN) 0.3 mg, Intramuscular, Once As Needed   • fexofenadine (ALLEGRA) 180 mg, Oral, Nightly   • fluticasone (Flonase) 50 MCG/ACT nasal spray 2 sprays, Nasal, Daily   • HYDROcodone-acetaminophen (NORCO) 5-325 MG per tablet 1 tablet, Every 4 Hours PRN   • losartan-hydrochlorothiazide (Hyzaar) 50-12.5 MG per tablet 1 tablet, Oral, Daily   • methylPREDNISolone (MEDROL) 4 MG dose pack Take as directed on package instructions.   • metroNIDAZOLE (METROCREAM) 0.75 % cream 1 application, Topical, 2 Times Daily   • Senexon-S 8.6-50 MG per tablet 1 tablet, Oral, Daily, AS NEEDED   • triamcinolone (KENALOG) 0.1 % ointment 1 application, Topical, 2 Times Daily       The following portions of the patient's history were reviewed and updated as appropriate: allergies, current medications, past family history, past medical history, past social history, past surgical history, and problem list.    Objective   Vital Signs:   /79  "(BP Location: Left arm, Patient Position: Sitting)   Pulse 104   Temp 97.5 °F (36.4 °C) (Temporal)   Ht 162.6 cm (64\")   Wt 93.5 kg (206 lb 3.2 oz)   SpO2 97%   BMI 35.39 kg/m²     Wt Readings from Last 3 Encounters:   03/23/23 93.5 kg (206 lb 3.2 oz)   03/10/23 93 kg (205 lb)   01/09/23 95.3 kg (210 lb)     BP Readings from Last 3 Encounters:   03/23/23 121/79   03/10/23 127/85   01/09/23 139/83     Physical Exam  Vitals reviewed.   Constitutional:       General: She is not in acute distress.     Appearance: Normal appearance. She is not ill-appearing, toxic-appearing or diaphoretic.   HENT:      Head: Normocephalic and atraumatic.      Right Ear: External ear normal.      Left Ear: External ear normal.   Eyes:      Extraocular Movements: Extraocular movements intact.      Conjunctiva/sclera: Conjunctivae normal.      Pupils: Pupils are equal, round, and reactive to light.      Comments: No swelling or edema noted around eyes   Cardiovascular:      Rate and Rhythm: Normal rate and regular rhythm.      Pulses: Normal pulses.      Heart sounds: Normal heart sounds. No murmur heard.    No friction rub. No gallop.   Pulmonary:      Effort: Pulmonary effort is normal. No respiratory distress.      Breath sounds: Normal breath sounds. No stridor. No wheezing, rhonchi or rales.   Chest:      Chest wall: No tenderness.   Abdominal:      General: Abdomen is flat.      Palpations: Abdomen is soft. There is no mass.      Tenderness: There is no abdominal tenderness.   Musculoskeletal:      Right lower leg: No edema.      Left lower leg: No edema.   Skin:     General: Skin is warm and dry.      Comments: Hyperpigmented papule noted upper back between scapulae   Neurological:      General: No focal deficit present.      Mental Status: She is alert. Mental status is at baseline.   Psychiatric:         Mood and Affect: Mood normal.         Behavior: Behavior normal.         Thought Content: Thought content normal.         " Judgment: Judgment normal.       Result Review :   The following data was reviewed by: Mauri Vital MD on 03/23/2023:  Common labs    Common Labs 9/15/22 9/15/22 1/9/23 1/9/23 1/9/23 3/1/23    1816 1816 1654 1654 1654    Glucose  127 (A) 142 (A)      BUN  18 16      Creatinine  0.88 0.90      Sodium  140 136      Potassium  3.6 3.6   3.9   Chloride  100 96 (A)      Calcium  10.2 10.1      Albumin  4.50 4.1      Total Bilirubin  0.3 0.3      Alkaline Phosphatase  78 74      AST (SGOT)  25 21      ALT (SGPT)  45 (A) 43 (A)      WBC 6.08    7.40    Hemoglobin 13.6    14.8    Hematocrit 42.1    44.3    Platelets 267    289    Total Cholesterol    232 (A)     Triglycerides    198 (A)     HDL Cholesterol    52     LDL Cholesterol     144 (A)     (A) Abnormal value                   Lab Results   Component Value Date    SARSANTIGEN Not Detected 11/30/2022    COVID19 Not Detected 11/30/2022    RAPFLUA Negative 05/09/2022    RAPFLUB Negative 05/09/2022    FLUAAG Not Detected 11/30/2022    FLUBAG Not Detected 11/30/2022    RAPSCRN Negative 11/30/2022    INR 1.0 02/27/2023    BILIRUBINUR Negative 10/12/2022       Procedures        Assessment and Plan    Diagnoses and all orders for this visit:    1. Essential hypertension (Primary)    2. Eye swelling, right  -     Ambulatory Referral to Ophthalmology  -     methylPREDNISolone (MEDROL) 4 MG dose pack; Take as directed on package instructions.  Dispense: 21 tablet; Refill: 0    3. Nevus  -     Ambulatory Referral to Dermatology    Other orders  -     EPINEPHrine (EPIPEN) 0.3 MG/0.3ML solution auto-injector injection; Inject 0.3 mL into the appropriate muscle as directed by prescriber 1 (One) Time As Needed (anaphylactic shock) for up to 1 dose.  Dispense: 1 each; Refill: 2      -exam reassuring, no evidence of cellulitis        Medications Discontinued During This Encounter   Medication Reason   • EPINEPHrine (EPIPEN) 0.3 MG/0.3ML solution auto-injector injection Reorder           Follow Up   Return in about 3 months (around 6/23/2023) for HTN.  Patient was given instructions and counseling regarding her condition or for health maintenance advice. Please see specific information pulled into the AVS if appropriate.       Mauri Vital MD  03/23/23  17:27 EDT

## 2023-04-11 NOTE — PROGRESS NOTES
Chief Complaint  Suture / Staple Removal (Patient states she has had them in since 3/1/23.) and Jaw Pain    Subjective          Holly Apple presents to [facility]  History of Present Illness      Had anterior cervical discectomy and fusion C5/C6 and C6/C7 as well as plate placement C5-C7 on 3/1/2023.  Reports noted a piece of suture around this area recently, and would like to have removed.  Explained that these are dissolvable sutures, and recommended she contact her neurosurgeon if she has concerns.    Also reports one week of sick symptoms.  Symptoms include sore throat, congestion, sinus drainage, sinus pressure.  Having mild cough and subjective fever.  No vomiting or diarrhea.    She is not checking BP at home.    She would like a referral to PT for her neck pain and associated should pain.    Current Outpatient Medications   Medication Instructions   • acetaminophen (TYLENOL) 1,000 mg, Oral, Every 6 Hours PRN   • amoxicillin-clavulanate (Augmentin) 875-125 MG per tablet 1 tablet, Oral, 2 Times Daily   • cholecalciferol (VITAMIN D3) 1,000 Units, Oral, Daily   • EPINEPHrine (EPIPEN) 0.3 mg, Intramuscular, Once As Needed   • fexofenadine (ALLEGRA) 180 mg, Oral, Nightly   • losartan-hydrochlorothiazide (Hyzaar) 50-12.5 MG per tablet 1 tablet, Oral, Daily   • Senexon-S 8.6-50 MG per tablet 1 tablet, Oral, Daily, AS NEEDED   • TobraDex 0.3-0.1 % ophthalmic suspension INSTILL 1 DRIP INTO RIGHT EYE 3 TIMES A DAY FOR 2 WEEKS THEN STOP   • triamcinolone (KENALOG) 0.1 % ointment 1 application, Topical, 2 Times Daily       The following portions of the patient's history were reviewed and updated as appropriate: allergies, current medications, past family history, past medical history, past social history, past surgical history, and problem list.    Objective   Vital Signs:   /84 (BP Location: Left arm, Patient Position: Sitting)   Pulse 106   Temp 98.3 °F (36.8  "°C) (Temporal)   Ht 162.6 cm (64\")   Wt 95.9 kg (211 lb 6.4 oz)   SpO2 95%   BMI 36.29 kg/m²     Wt Readings from Last 3 Encounters:   04/12/23 95.9 kg (211 lb 6.4 oz)   03/23/23 93.5 kg (206 lb 3.2 oz)   03/10/23 93 kg (205 lb)     BP Readings from Last 3 Encounters:   04/12/23 140/84   03/23/23 121/79   03/10/23 127/85     Physical Exam  Vitals reviewed.   Constitutional:       General: She is not in acute distress.     Appearance: Normal appearance. She is not ill-appearing, toxic-appearing or diaphoretic.   HENT:      Head: Normocephalic and atraumatic.      Right Ear: External ear normal.      Left Ear: External ear normal.      Mouth/Throat:      Mouth: Mucous membranes are moist.      Pharynx: Oropharynx is clear. No oropharyngeal exudate.      Comments: No dental anomalies noted.  No gum abnormalities noted  Eyes:      Conjunctiva/sclera: Conjunctivae normal.   Cardiovascular:      Rate and Rhythm: Normal rate and regular rhythm.      Pulses: Normal pulses.      Heart sounds: Normal heart sounds. No murmur heard.    No friction rub. No gallop.   Pulmonary:      Effort: Pulmonary effort is normal. No respiratory distress.      Breath sounds: Normal breath sounds. No stridor. No wheezing, rhonchi or rales.   Chest:      Chest wall: No tenderness.   Abdominal:      General: Abdomen is flat.      Palpations: Abdomen is soft. There is no mass.      Tenderness: There is no abdominal tenderness.   Musculoskeletal:      Right lower leg: No edema.      Left lower leg: No edema.   Skin:     General: Skin is warm and dry.      Comments: Surgical scar on neck noted.  No erythema, no induration.  Small piece of suture exposed.   Neurological:      General: No focal deficit present.      Mental Status: She is alert. Mental status is at baseline.   Psychiatric:         Mood and Affect: Mood normal.         Behavior: Behavior normal.         Thought Content: Thought content normal.         Judgment: Judgment normal. "       Result Review :   The following data was reviewed by: Mauri Vital MD on 04/12/2023:  Common labs        1/9/2023    16:54 2/27/2023    12:42 3/1/2023    10:39   Common Labs   Glucose 142       BUN 16       Creatinine 0.90       Sodium 136       Potassium 3.6    3.9        Chloride 96       Calcium 10.1       Albumin 4.1       Total Bilirubin 0.3       Alkaline Phosphatase 74       AST (SGOT) 21       ALT (SGPT) 43       WBC 7.40   5.71         Hemoglobin 14.8   14.4         Hematocrit 44.3   43.1         Platelets 289   319         Total Cholesterol 232       Triglycerides 198       HDL Cholesterol 52       LDL Cholesterol  144           This result is from an external source.            Lab Results   Component Value Date    SARSANTIGEN Not Detected 04/12/2023    COVID19 Not Detected 11/30/2022    RAPFLUA Negative 05/09/2022    RAPFLUB Negative 05/09/2022    FLUAAG Not Detected 04/12/2023    FLUBAG Not Detected 04/12/2023    RAPSCRN Negative 04/12/2023    INR 1.0 02/27/2023    BILIRUBINUR Negative 10/12/2022       Procedures        Assessment and Plan    Diagnoses and all orders for this visit:    1. Cervicalgia (Primary)  -     Ambulatory Referral to Physical Therapy Evaluate and treat    2. Essential hypertension    3. Sore throat  -     POCT rapid strep A  -     Throat / Upper Respiratory Culture - Swab, Throat; Future  -     Throat / Upper Respiratory Culture - Swab, Throat    4. Sinus pressure  -     POCT SARS-CoV-2 Antigen DEION + Flu  -     COVID-19,APTIMA PANTHER(JESSI),[facility], NP/OP SWAB IN UTM/VTM/[facility],24 HR TAT - Swab, Nasopharynx    5. Acute non-recurrent maxillary sinusitis  -     amoxicillin-clavulanate (Augmentin) 875-125 MG per tablet; Take 1 tablet by mouth 2 (Two) Times a Day for 5 days.  Dispense: 10 tablet; Refill: 0    6. Chronic pain of both shoulders  -     Ambulatory Referral to Physical Therapy Evaluate and treat      Neck complaints:  -reassured regarding  surgical site, though I did encourage her to contact neurosurgery as well    HTN:  -above goal  -as she is sick, I will monitor for now    Medications Discontinued During This Encounter   Medication Reason   • methylPREDNISolone (MEDROL) 4 MG dose pack *Therapy completed   • HYDROcodone-acetaminophen (NORCO) 5-325 MG per tablet    • cyclobenzaprine (FLEXERIL) 10 MG tablet    • metroNIDAZOLE (METROCREAM) 0.75 % cream    • fluticasone (Flonase) 50 MCG/ACT nasal spray *Therapy completed          Follow Up   Return if symptoms worsen or fail to improve.  Patient was given instructions and counseling regarding her condition or for health maintenance advice. Please see specific information pulled into the AVS if appropriate.       Mauri Vital MD  04/12/23  17:01 EDT

## 2023-04-12 ENCOUNTER — OFFICE VISIT (OUTPATIENT)
Dept: INTERNAL MEDICINE | Facility: CLINIC | Age: 53
End: 2023-04-12
Payer: COMMERCIAL

## 2023-04-12 VITALS
BODY MASS INDEX: 36.09 KG/M2 | OXYGEN SATURATION: 95 % | HEART RATE: 106 BPM | WEIGHT: 211.4 LBS | HEIGHT: 64 IN | SYSTOLIC BLOOD PRESSURE: 140 MMHG | TEMPERATURE: 98.3 F | DIASTOLIC BLOOD PRESSURE: 84 MMHG

## 2023-04-12 DIAGNOSIS — G89.29 CHRONIC PAIN OF BOTH SHOULDERS: ICD-10-CM

## 2023-04-12 DIAGNOSIS — J34.89 SINUS PRESSURE: ICD-10-CM

## 2023-04-12 DIAGNOSIS — J02.9 SORE THROAT: ICD-10-CM

## 2023-04-12 DIAGNOSIS — M25.512 CHRONIC PAIN OF BOTH SHOULDERS: ICD-10-CM

## 2023-04-12 DIAGNOSIS — I10 ESSENTIAL HYPERTENSION: ICD-10-CM

## 2023-04-12 DIAGNOSIS — M25.511 CHRONIC PAIN OF BOTH SHOULDERS: ICD-10-CM

## 2023-04-12 DIAGNOSIS — M54.2 CERVICALGIA: Primary | ICD-10-CM

## 2023-04-12 DIAGNOSIS — J01.00 ACUTE NON-RECURRENT MAXILLARY SINUSITIS: ICD-10-CM

## 2023-04-12 LAB
EXPIRATION DATE: NORMAL
EXPIRATION DATE: NORMAL
FLUAV AG UPPER RESP QL IA.RAPID: NOT DETECTED
FLUBV AG UPPER RESP QL IA.RAPID: NOT DETECTED
INTERNAL CONTROL: NORMAL
INTERNAL CONTROL: NORMAL
Lab: NORMAL
Lab: NORMAL
S PYO AG THROAT QL: NEGATIVE
SARS-COV-2 AG UPPER RESP QL IA.RAPID: NOT DETECTED
SARS-COV-2 RNA RESP QL NAA+PROBE: NOT DETECTED

## 2023-04-12 PROCEDURE — U0004 COV-19 TEST NON-CDC HGH THRU: HCPCS | Performed by: STUDENT IN AN ORGANIZED HEALTH CARE EDUCATION/TRAINING PROGRAM

## 2023-04-12 PROCEDURE — 87081 CULTURE SCREEN ONLY: CPT | Performed by: STUDENT IN AN ORGANIZED HEALTH CARE EDUCATION/TRAINING PROGRAM

## 2023-04-12 RX ORDER — AMOXICILLIN AND CLAVULANATE POTASSIUM 875; 125 MG/1; MG/1
1 TABLET, FILM COATED ORAL 2 TIMES DAILY
Qty: 10 TABLET | Refills: 0 | Status: SHIPPED | OUTPATIENT
Start: 2023-04-12 | End: 2023-04-17

## 2023-04-13 ENCOUNTER — TELEPHONE (OUTPATIENT)
Dept: INTERNAL MEDICINE | Facility: CLINIC | Age: 53
End: 2023-04-13
Payer: COMMERCIAL

## 2023-04-13 NOTE — TELEPHONE ENCOUNTER
----- Message from Mauri Vital MD sent at 4/13/2023  6:40 AM EDT -----  PCR test for COVID is negative.    Rapid strep, flu and covid also negative.

## 2023-04-14 LAB — BACTERIA SPEC AEROBE CULT: NORMAL

## 2023-04-17 ENCOUNTER — TELEPHONE (OUTPATIENT)
Dept: INTERNAL MEDICINE | Facility: CLINIC | Age: 53
End: 2023-04-17
Payer: COMMERCIAL

## 2023-04-17 NOTE — TELEPHONE ENCOUNTER
----- Message from Mauri Vital MD sent at 4/15/2023 11:12 AM EDT -----  Throat culture is negative.

## 2023-04-17 NOTE — TELEPHONE ENCOUNTER
Attempted to contact patient regarding lab results. Left Voicemail to call our office back.     HUB OK TO READ/ ADVISE:      Throat culture is negative.

## 2023-08-02 ENCOUNTER — OFFICE VISIT (OUTPATIENT)
Dept: INTERNAL MEDICINE | Facility: CLINIC | Age: 53
End: 2023-08-02
Payer: COMMERCIAL

## 2023-08-02 VITALS
SYSTOLIC BLOOD PRESSURE: 128 MMHG | WEIGHT: 213 LBS | DIASTOLIC BLOOD PRESSURE: 85 MMHG | BODY MASS INDEX: 36.37 KG/M2 | TEMPERATURE: 97.8 F | HEIGHT: 64 IN | OXYGEN SATURATION: 94 % | HEART RATE: 90 BPM

## 2023-08-02 DIAGNOSIS — I10 ESSENTIAL HYPERTENSION: Primary | ICD-10-CM

## 2023-08-02 DIAGNOSIS — R74.8 ELEVATED LIVER ENZYMES: ICD-10-CM

## 2023-08-02 DIAGNOSIS — H60.331 ACUTE SWIMMER'S EAR OF RIGHT SIDE: ICD-10-CM

## 2023-08-02 DIAGNOSIS — H92.01 ACUTE OTALGIA, RIGHT: ICD-10-CM

## 2023-08-02 LAB
ALBUMIN SERPL-MCNC: 4.4 G/DL (ref 3.5–5.2)
ALBUMIN/GLOB SERPL: 1.4 G/DL
ALP SERPL-CCNC: 74 U/L (ref 39–117)
ALT SERPL W P-5'-P-CCNC: 46 U/L (ref 1–33)
ANION GAP SERPL CALCULATED.3IONS-SCNC: 8.4 MMOL/L (ref 5–15)
AST SERPL-CCNC: 26 U/L (ref 1–32)
BILIRUB SERPL-MCNC: 0.3 MG/DL (ref 0–1.2)
BUN SERPL-MCNC: 16 MG/DL (ref 6–20)
BUN/CREAT SERPL: 19.3 (ref 7–25)
CALCIUM SPEC-SCNC: 10 MG/DL (ref 8.6–10.5)
CHLORIDE SERPL-SCNC: 100 MMOL/L (ref 98–107)
CO2 SERPL-SCNC: 28.6 MMOL/L (ref 22–29)
CREAT SERPL-MCNC: 0.83 MG/DL (ref 0.57–1)
EGFRCR SERPLBLD CKD-EPI 2021: 83.9 ML/MIN/1.73
GLOBULIN UR ELPH-MCNC: 3.2 GM/DL
GLUCOSE SERPL-MCNC: 139 MG/DL (ref 65–99)
POTASSIUM SERPL-SCNC: 3.8 MMOL/L (ref 3.5–5.2)
PROT SERPL-MCNC: 7.6 G/DL (ref 6–8.5)
SODIUM SERPL-SCNC: 137 MMOL/L (ref 136–145)

## 2023-08-02 PROCEDURE — 80053 COMPREHEN METABOLIC PANEL: CPT

## 2023-08-02 RX ORDER — CIPROFLOXACIN AND DEXAMETHASONE 3; 1 MG/ML; MG/ML
4 SUSPENSION/ DROPS AURICULAR (OTIC) 2 TIMES DAILY
Qty: 7.5 ML | Refills: 0 | Status: SHIPPED | OUTPATIENT
Start: 2023-08-02 | End: 2023-08-16

## 2023-08-03 ENCOUNTER — TELEPHONE (OUTPATIENT)
Dept: INTERNAL MEDICINE | Facility: CLINIC | Age: 53
End: 2023-08-03
Payer: COMMERCIAL

## 2023-08-23 DIAGNOSIS — I10 ESSENTIAL HYPERTENSION: ICD-10-CM

## 2023-08-23 RX ORDER — LOSARTAN POTASSIUM AND HYDROCHLOROTHIAZIDE 12.5; 1 MG/1; MG/1
TABLET ORAL
Qty: 30 TABLET | Refills: 1 | Status: SHIPPED | OUTPATIENT
Start: 2023-08-23

## 2023-08-30 ENCOUNTER — OFFICE VISIT (OUTPATIENT)
Dept: INTERNAL MEDICINE | Facility: CLINIC | Age: 53
End: 2023-08-30
Payer: COMMERCIAL

## 2023-08-30 VITALS
SYSTOLIC BLOOD PRESSURE: 138 MMHG | BODY MASS INDEX: 37.24 KG/M2 | DIASTOLIC BLOOD PRESSURE: 84 MMHG | HEART RATE: 107 BPM | WEIGHT: 218.13 LBS | HEIGHT: 64 IN | TEMPERATURE: 98.7 F | OXYGEN SATURATION: 95 %

## 2023-08-30 DIAGNOSIS — E55.9 VITAMIN D DEFICIENCY: ICD-10-CM

## 2023-08-30 DIAGNOSIS — I10 ESSENTIAL HYPERTENSION: ICD-10-CM

## 2023-08-30 DIAGNOSIS — Z00.00 ENCOUNTER FOR MEDICAL EXAMINATION TO ESTABLISH CARE: Primary | ICD-10-CM

## 2023-08-30 DIAGNOSIS — M54.2 CERVICALGIA: ICD-10-CM

## 2023-08-30 DIAGNOSIS — Z12.11 SCREENING FOR COLON CANCER: ICD-10-CM

## 2023-08-30 DIAGNOSIS — R79.89 ELEVATED LIVER FUNCTION TESTS: ICD-10-CM

## 2023-08-30 DIAGNOSIS — H26.9 CATARACT OF BOTH EYES, UNSPECIFIED CATARACT TYPE: ICD-10-CM

## 2023-08-30 DIAGNOSIS — M25.512 CHRONIC LEFT SHOULDER PAIN: ICD-10-CM

## 2023-08-30 DIAGNOSIS — R63.5 WEIGHT GAIN: ICD-10-CM

## 2023-08-30 DIAGNOSIS — R73.01 IFG (IMPAIRED FASTING GLUCOSE): ICD-10-CM

## 2023-08-30 DIAGNOSIS — J01.00 ACUTE NON-RECURRENT MAXILLARY SINUSITIS: ICD-10-CM

## 2023-08-30 DIAGNOSIS — G89.29 CHRONIC LEFT SHOULDER PAIN: ICD-10-CM

## 2023-08-30 DIAGNOSIS — H04.123 DRY EYES: ICD-10-CM

## 2023-08-30 LAB
ALBUMIN SERPL-MCNC: 4.3 G/DL (ref 3.5–5.2)
ALBUMIN/GLOB SERPL: 1.3 G/DL
ALP SERPL-CCNC: 77 U/L (ref 39–117)
ALT SERPL W P-5'-P-CCNC: 53 U/L (ref 1–33)
ANION GAP SERPL CALCULATED.3IONS-SCNC: 13.6 MMOL/L (ref 5–15)
AST SERPL-CCNC: 32 U/L (ref 1–32)
BASOPHILS # BLD AUTO: 0.04 10*3/MM3 (ref 0–0.2)
BASOPHILS NFR BLD AUTO: 0.6 % (ref 0–1.5)
BILIRUB SERPL-MCNC: 0.4 MG/DL (ref 0–1.2)
BUN SERPL-MCNC: 12 MG/DL (ref 6–20)
BUN/CREAT SERPL: 13 (ref 7–25)
CALCIUM SPEC-SCNC: 9.4 MG/DL (ref 8.6–10.5)
CHLORIDE SERPL-SCNC: 97 MMOL/L (ref 98–107)
CO2 SERPL-SCNC: 26.4 MMOL/L (ref 22–29)
CREAT SERPL-MCNC: 0.92 MG/DL (ref 0.57–1)
DEPRECATED RDW RBC AUTO: 39.2 FL (ref 37–54)
EGFRCR SERPLBLD CKD-EPI 2021: 74.1 ML/MIN/1.73
EOSINOPHIL # BLD AUTO: 0.13 10*3/MM3 (ref 0–0.4)
EOSINOPHIL NFR BLD AUTO: 2 % (ref 0.3–6.2)
ERYTHROCYTE [DISTWIDTH] IN BLOOD BY AUTOMATED COUNT: 13.3 % (ref 12.3–15.4)
GLOBULIN UR ELPH-MCNC: 3.3 GM/DL
GLUCOSE SERPL-MCNC: 223 MG/DL (ref 65–99)
HBA1C MFR BLD: 7.1 % (ref 4.8–5.6)
HCT VFR BLD AUTO: 44.2 % (ref 34–46.6)
HGB BLD-MCNC: 14.4 G/DL (ref 12–15.9)
IMM GRANULOCYTES # BLD AUTO: 0.01 10*3/MM3 (ref 0–0.05)
IMM GRANULOCYTES NFR BLD AUTO: 0.2 % (ref 0–0.5)
LYMPHOCYTES # BLD AUTO: 2.86 10*3/MM3 (ref 0.7–3.1)
LYMPHOCYTES NFR BLD AUTO: 43.5 % (ref 19.6–45.3)
MCH RBC QN AUTO: 26.2 PG (ref 26.6–33)
MCHC RBC AUTO-ENTMCNC: 32.6 G/DL (ref 31.5–35.7)
MCV RBC AUTO: 80.4 FL (ref 79–97)
MONOCYTES # BLD AUTO: 0.44 10*3/MM3 (ref 0.1–0.9)
MONOCYTES NFR BLD AUTO: 6.7 % (ref 5–12)
NEUTROPHILS NFR BLD AUTO: 3.09 10*3/MM3 (ref 1.7–7)
NEUTROPHILS NFR BLD AUTO: 47 % (ref 42.7–76)
NRBC BLD AUTO-RTO: 0 /100 WBC (ref 0–0.2)
PLATELET # BLD AUTO: 289 10*3/MM3 (ref 140–450)
PMV BLD AUTO: 9.1 FL (ref 6–12)
POTASSIUM SERPL-SCNC: 3.8 MMOL/L (ref 3.5–5.2)
PROT SERPL-MCNC: 7.6 G/DL (ref 6–8.5)
RBC # BLD AUTO: 5.5 10*6/MM3 (ref 3.77–5.28)
SODIUM SERPL-SCNC: 137 MMOL/L (ref 136–145)
WBC NRBC COR # BLD: 6.57 10*3/MM3 (ref 3.4–10.8)

## 2023-08-30 PROCEDURE — 83036 HEMOGLOBIN GLYCOSYLATED A1C: CPT | Performed by: NURSE PRACTITIONER

## 2023-08-30 PROCEDURE — 85025 COMPLETE CBC W/AUTO DIFF WBC: CPT | Performed by: NURSE PRACTITIONER

## 2023-08-30 PROCEDURE — 80053 COMPREHEN METABOLIC PANEL: CPT | Performed by: NURSE PRACTITIONER

## 2023-08-30 RX ORDER — AMOXICILLIN AND CLAVULANATE POTASSIUM 875; 125 MG/1; MG/1
1 TABLET, FILM COATED ORAL 2 TIMES DAILY
Qty: 20 TABLET | Refills: 0 | Status: SHIPPED | OUTPATIENT
Start: 2023-08-30 | End: 2023-09-09

## 2023-08-30 RX ORDER — MELATONIN
1000 DAILY
Qty: 90 TABLET | Refills: 1 | Status: SHIPPED | OUTPATIENT
Start: 2023-08-30

## 2023-08-30 NOTE — PROGRESS NOTES
Chief Complaint  Liver Problems (Concerns with Liver, does not drink, concerns that Neighbor did something to cause this ), Weight Gain, and Rapid Heart Rate    Subjective          Holly Apple presents to [facility]    Sinusitis  This is a new problem. The current episode started 1 to 4 weeks ago. The problem is unchanged. There has been no fever. Associated symptoms include congestion, coughing, neck pain and sinus pressure. Pertinent negatives include no chills, diaphoresis, ear pain, headaches, hoarse voice, shortness of breath, sneezing, sore throat or swollen glands. Treatments tried: OTC medication.   Neck Pain   This is a recurrent problem. Pertinent negatives include no chest pain, fever, headaches, leg pain, numbness, pain with swallowing, paresis, photophobia, syncope, tingling, trouble swallowing, visual change, weakness or weight loss.   Wanting new physical therapy       Wants new ophthalmology referral.       Current Outpatient Medications   Medication Instructions    amoxicillin-clavulanate (AUGMENTIN) 875-125 MG per tablet 1 tablet, Oral, 2 Times Daily    cholecalciferol (VITAMIN D3) 1,000 Units, Oral, Daily    EPINEPHrine (EPIPEN) 0.3 mg, Intramuscular, Once As Needed    fexofenadine (ALLEGRA) 180 mg, Oral, Nightly    fluorometholone (FML) 0.1 % ophthalmic suspension     losartan-hydrochlorothiazide (HYZAAR) 100-12.5 MG per tablet TAKE 1 TABLET BY MOUTH EVERY DAY    Polyethylene Glycol 400 (BLINK TEARS OP) Ophthalmic    rosuvastatin (CRESTOR) 5 mg, Oral, Daily    saccharomyces boulardii (FLORASTOR) 250 mg, Oral, 2 Times Daily    Tetrahydroz-Polyvinyl Al-Povid (CLEAR EYES TRIPLE ACTION OP) Ophthalmic    TobraDex 0.3-0.1 % ophthalmic suspension INSTILL 1 DRIP INTO RIGHT EYE 3 TIMES A DAY FOR 2 WEEKS THEN STOP    triamcinolone (KENALOG) 0.1 % ointment 1 application , Topical, 2 Times Daily       The following portions of the patient's history were  "reviewed and updated as appropriate: allergies, current medications, past family history, past medical history, past social history, past surgical history, and problem list.    Objective   Vital Signs:   /84 (BP Location: Left arm, Patient Position: Sitting, Cuff Size: Adult)   Pulse 107   Temp 98.7 øF (37.1 øC) (Temporal)   Ht 162.6 cm (64\")   Wt 98.9 kg (218 lb 2 oz)   SpO2 95%   BMI 37.44 kg/mý     BP Readings from Last 3 Encounters:   08/30/23 138/84   08/02/23 128/85   07/14/23 123/84     Wt Readings from Last 3 Encounters:   08/30/23 98.9 kg (218 lb 2 oz)   08/02/23 96.6 kg (213 lb)   07/14/23 96.5 kg (212 lb 12.8 oz)           The 10-year ASCVD risk score (Huber PETIT, et al., 2019) is: 3.6%    Values used to calculate the score:      Age: 52 years      Sex: Female      Is Non- : No      Diabetic: No      Tobacco smoker: No      Systolic Blood Pressure: 138 mmHg      Is BP treated: Yes      HDL Cholesterol: 55 mg/dL      Total Cholesterol: 247 mg/dL    Physical Exam  HENT:      Nose:      Right Sinus: Maxillary sinus tenderness present.      Left Sinus: Maxillary sinus tenderness present.        Appearance: No acute distress, well-nourished  Head: normocephalic, atraumatic  Eyes: extraocular movements intact, no scleral icterus, no conjunctival injection  Ears, Nose, and Throat: external ears normal, nares patent, moist mucous membranes  Cardiovascular: regular rate and rhythm. no murmurs, rubs, or gallops. no edema  Respiratory: breathing comfortably, symmetric chest rise, clear to auscultation bilaterally. No wheezes, rales, or rhonchi.  Neuro: alert and oriented to time, place, and person. Normal gait  Psych: normal mood and affect     Result Review :   The following data was reviewed by: KASSANDRA Wilder on 08/30/2023:  Common labs          7/14/2023    14:30 8/2/2023    16:02 8/30/2023    15:56   Common Labs   Glucose 114  139  223    BUN 16  16  12  "   Creatinine 0.91  0.83  0.92    Sodium 137  137  137    Potassium 4.3  3.8  3.8    Chloride 98  100  97    Calcium 9.8  10.0  9.4    Albumin 4.4  4.4  4.3    Total Bilirubin 0.3  0.3  0.4    Alkaline Phosphatase 69  74  77    AST (SGOT) 42  26  32    ALT (SGPT) 67  46  53    WBC 6.14   6.57    Hemoglobin 14.1   14.4    Hematocrit 43.7   44.2    Platelets 286   289    Total Cholesterol 247      Triglycerides 158      HDL Cholesterol 55      LDL Cholesterol  163      Hemoglobin A1C   7.10             Lab Results   Component Value Date    SARSANTIGEN Not Detected 04/12/2023    COVID19 Not Detected 04/12/2023    RAPFLUA Negative 05/09/2022    RAPFLUB Negative 05/09/2022    FLUAAG Not Detected 04/12/2023    FLUBAG Not Detected 04/12/2023    RAPSCRN Negative 04/12/2023    INR 1.0 02/27/2023    BILIRUBINUR Negative 06/26/2023       Procedures        Assessment and Plan    Diagnoses and all orders for this visit:    1. Encounter for medical examination to establish care (Primary)    2. Weight gain    3. Vitamin D deficiency  -     cholecalciferol (VITAMIN D3) 25 MCG (1000 UT) tablet; Take 1 tablet by mouth Daily.  Dispense: 90 tablet; Refill: 1    4. Essential hypertension    5. Cervicalgia  -     Ambulatory Referral to Physical Therapy Evaluate and treat    6. Elevated liver function tests  -     Comprehensive metabolic panel    7. Acute non-recurrent maxillary sinusitis  -     amoxicillin-clavulanate (AUGMENTIN) 875-125 MG per tablet; Take 1 tablet by mouth 2 (Two) Times a Day for 10 days.  Dispense: 20 tablet; Refill: 0    8. Chronic left shoulder pain  -     Ambulatory Referral to Physical Therapy Evaluate and treat    9. IFG (impaired fasting glucose)  -     CBC & Differential  -     Hemoglobin A1c    10. Cataract of both eyes, unspecified cataract type  -     Ambulatory Referral to Ophthalmology    11. Dry eyes  -     Ambulatory Referral to Ophthalmology    12. Screening for colon cancer  -     Cologuard - Stool, Per  Rectum; Future          Medications Discontinued During This Encounter   Medication Reason    metroNIDAZOLE (METROGEL) 1 % gel *Therapy completed    Senexon-S 8.6-50 MG per tablet Historical Med - Therapy completed    naphazoline-pheniramine (NAPHCON-A) 0.025-0.3 % ophthalmic solution Historical Med - Therapy completed    Senexon-S 8.6-50 MG per tablet Historical Med - Therapy completed    acetaminophen (TYLENOL) 500 MG tablet Historical Med - Therapy completed    cholecalciferol (VITAMIN D3) 25 MCG (1000 UT) tablet Reorder          Follow Up   Return in about 3 months (around 11/30/2023).  Patient was given instructions and counseling regarding her condition or for health maintenance advice. Please see specific information pulled into the AVS if appropriate.       Isa Wolf, KASSANDRA  09/01/23  08:12 EDT

## 2023-08-30 NOTE — PROGRESS NOTES
"Chief Complaint  Sore Throat, Cough, and Sinus Problem    Subjective          Holly Apple presents to [facility]  History of Present Illness    Here for a sick visit.  Here with complaints of cough, congestion, sinus pressure, sore throat.  Unsure if she has had fever.  Some diarrhea, but no vomiting.  Tolerating PO.    Started 11/12/22.    Current Outpatient Medications   Medication Instructions   • amoxicillin-clavulanate (Augmentin) 875-125 MG per tablet 1 tablet, Oral, 2 Times Daily   • EPINEPHrine (EPIPEN) 0.3 MG/0.3ML solution auto-injector injection 0.3 mg   • fexofenadine (ALLEGRA) 180 mg, Oral, Nightly   • fluticasone (Flonase) 50 MCG/ACT nasal spray 2 sprays, Nasal, Daily   • ketorolac (TORADOL) 10 mg, Oral, Every 6 Hours PRN   • losartan-hydrochlorothiazide (Hyzaar) 100-25 MG per tablet 1 tablet, Oral, Daily   • phenol (CHLORASEPTIC) 1.4 % liquid liquid 1 spray, Mouth/Throat, Every 2 Hours PRN       The following portions of the patient's history were reviewed and updated as appropriate: allergies, current medications, past family history, past medical history, past social history, past surgical history, and problem list.    Objective   Vital Signs:   /82   Pulse 80   Temp 98.1 °F (36.7 °C) (Temporal)   Ht 162.6 cm (64\")   Wt 94.6 kg (208 lb 9.6 oz)   SpO2 97%   BMI 35.81 kg/m²     Wt Readings from Last 3 Encounters:   11/30/22 94.6 kg (208 lb 9.6 oz)   10/17/22 94.3 kg (208 lb)   10/12/22 97.6 kg (215 lb 3.2 oz)     BP Readings from Last 3 Encounters:   11/30/22 128/82   10/17/22 122/79   10/12/22 126/84     Physical Exam  Vitals reviewed.   Constitutional:       General: She is not in acute distress.     Appearance: Normal appearance. She is not ill-appearing, toxic-appearing or diaphoretic.   HENT:      Head: Normocephalic and atraumatic.      Comments: Fluid under right TM, mildly cloudy     Right Ear: External ear normal.      Left Ear: " Tympanic membrane, ear canal and external ear normal.      Ears:      Comments: Sinuses tender bilaterally frontal and maxillary regions     Mouth/Throat:      Mouth: Mucous membranes are moist.      Pharynx: Oropharynx is clear. No oropharyngeal exudate or posterior oropharyngeal erythema.   Eyes:      Conjunctiva/sclera: Conjunctivae normal.   Cardiovascular:      Rate and Rhythm: Normal rate and regular rhythm.      Pulses: Normal pulses.      Heart sounds: Normal heart sounds. No murmur heard.    No friction rub. No gallop.   Pulmonary:      Effort: Pulmonary effort is normal. No respiratory distress.      Breath sounds: Normal breath sounds. No stridor. No wheezing, rhonchi or rales.   Chest:      Chest wall: No tenderness.   Abdominal:      General: Abdomen is flat.      Palpations: Abdomen is soft. There is no mass.      Tenderness: There is no abdominal tenderness.   Musculoskeletal:      Right lower leg: No edema.      Left lower leg: No edema.   Skin:     General: Skin is warm and dry.   Neurological:      General: No focal deficit present.      Mental Status: She is alert. Mental status is at baseline.   Psychiatric:         Mood and Affect: Mood normal.         Behavior: Behavior normal.         Thought Content: Thought content normal.         Judgment: Judgment normal.       Result Review :   The following data was reviewed by: Mauri Vital MD on 11/30/2022:  Common labs    Common Labs 7/27/22 7/27/22 7/27/22 8/31/22 9/15/22 9/15/22    1447 1447 1447  1816 1816   Glucose  86  113 (A)  127 (A)   BUN  14  13  18   Creatinine  0.90  0.90  0.88   Sodium  139  136  140   Potassium  4.3  3.6  3.6   Chloride  101  98  100   Calcium  9.7  9.5  10.2   Albumin  4.70  4.50  4.50   Total Bilirubin  0.2  0.3  0.3   Alkaline Phosphatase  72  73  78   AST (SGOT)  24  26  25   ALT (SGPT)  38 (A)  44 (A)  45 (A)   WBC 6.81    6.08    Hemoglobin 13.8    13.6    Hematocrit 42.0    42.1    Platelets 315    267    Total  Cholesterol   209 (A)      Triglycerides   154 (A)      HDL Cholesterol   54      LDL Cholesterol    128 (A)      (A) Abnormal value                   Lab Results   Component Value Date    SARSANTIGEN Not Detected 11/30/2022    COVID19 Not Detected 06/07/2021    RAPFLUA Negative 05/09/2022    RAPFLUB Negative 05/09/2022    FLUAAG Not Detected 11/30/2022    FLUBAG Not Detected 11/30/2022    RAPSCRN Negative 11/30/2022    BILIRUBINUR Negative 10/12/2022       Procedures        Assessment and Plan    Diagnoses and all orders for this visit:    1. Sore throat (Primary)  -     POC Rapid Strep A  -     POCT SARS-CoV-2 Antigen DEION  -     phenol (CHLORASEPTIC) 1.4 % liquid liquid; Apply 1 spray to the mouth or throat Every 2 (Two) Hours As Needed (sore throat).  Dispense: 177 mL; Refill: 0  -     COVID-19,APTIMA PANTHER(JESSI),[facility], NP/OP SWAB IN UTM/VTM/[facility] TRANSPORT MEDIA,24 HR TAT - Swab, Nasopharynx  -     Beta Strep Culture, Throat - Swab, Throat    2. Sinus complaint    3. Nonintractable headache, unspecified chronicity pattern, unspecified headache type  -     POC Rapid Strep A  -     POCT SARS-CoV-2 Antigen DEION    4. Sinusitis, unspecified chronicity, unspecified location  -     amoxicillin-clavulanate (Augmentin) 875-125 MG per tablet; Take 1 tablet by mouth 2 (Two) Times a Day for 5 days.  Dispense: 10 tablet; Refill: 0          There are no discontinued medications.       Follow Up   Return if symptoms worsen or fail to improve.  Patient was given instructions and counseling regarding her condition or for health maintenance advice. Please see specific information pulled into the AVS if appropriate.       Mauri Vital MD  11/30/22  17:52 EST             No

## 2023-09-01 PROBLEM — E55.9 VITAMIN D DEFICIENCY: Status: ACTIVE | Noted: 2023-09-01

## 2023-09-01 NOTE — PROGRESS NOTES
Liver function slightly elevated. We will continue to monitor.     A1C 7.1, which is indicative of Type 2 diabetes.   I would like patient to start metformin twice daily and decrease carb and sugar intake.   If she is interested on Tuesday we can also start Mounjaro with the metformin which will really help her with weight loss and lower that A1C.

## 2023-09-14 ENCOUNTER — TELEPHONE (OUTPATIENT)
Dept: INTERNAL MEDICINE | Facility: CLINIC | Age: 53
End: 2023-09-14

## 2023-09-14 DIAGNOSIS — E11.65 TYPE 2 DIABETES MELLITUS WITH HYPERGLYCEMIA, WITHOUT LONG-TERM CURRENT USE OF INSULIN: Primary | ICD-10-CM

## 2023-09-14 RX ORDER — TIRZEPATIDE 2.5 MG/.5ML
2.5 INJECTION, SOLUTION SUBCUTANEOUS WEEKLY
Qty: 2 ML | Refills: 0 | Status: SHIPPED | OUTPATIENT
Start: 2023-09-14

## 2023-09-14 NOTE — TELEPHONE ENCOUNTER
Caller: Holly Apple    Relationship: Self    Best call back number: [phone]     What medication are you requesting: WEIGHT LOSS MEDICATION, DIABETIC MEDICATION     If a prescription is needed, what is your preferred pharmacy and phone number: [facility] #[id number] - [place], [place] - [address] [facility] [phone] [facility] [phone] FX     Additional notes: PATIENT STATES THAT PER HER CONVERSATION WITH PROVIDER THAT SHE WAS SUPPOSED TO GO ON A DIABETIC MEDICATION AS WELL AS A WEIGHT LOSS MEDICATION. PATIENT WENT TO PHARMACY AND IS IT NOT THERE. PLEASE CALL TO ADVISE.

## 2023-09-14 NOTE — TELEPHONE ENCOUNTER
Patient called for her lab results, is wanting to have Belkys sent to [facility].

## 2023-09-15 DIAGNOSIS — E11.65 TYPE 2 DIABETES MELLITUS WITH HYPERGLYCEMIA, WITHOUT LONG-TERM CURRENT USE OF INSULIN: Primary | ICD-10-CM

## 2023-09-18 ENCOUNTER — PRIOR AUTHORIZATION (OUTPATIENT)
Dept: INTERNAL MEDICINE | Facility: CLINIC | Age: 53
End: 2023-09-18
Payer: COMMERCIAL

## 2023-09-18 NOTE — TELEPHONE ENCOUNTER
Patient called stating that a PA is needed on the following medication:    Tirzepatide (Mounjaro) 2.5 MG/0.5ML solution pen-injector (09/14/2023)

## 2023-09-20 NOTE — TELEPHONE ENCOUNTER
Approved  The request has been approved. The authorization is effective from 09/18/2023 to 09/17/2024, as long as the member is enrolled in their current health plan

## 2023-09-27 ENCOUNTER — CLINICAL SUPPORT (OUTPATIENT)
Dept: INTERNAL MEDICINE | Facility: CLINIC | Age: 53
End: 2023-09-27
Payer: COMMERCIAL

## 2023-09-27 ENCOUNTER — TELEPHONE (OUTPATIENT)
Dept: INTERNAL MEDICINE | Facility: CLINIC | Age: 53
End: 2023-09-27
Payer: COMMERCIAL

## 2023-09-27 DIAGNOSIS — Z20.822 SUSPECTED COVID-19 VIRUS INFECTION: Primary | ICD-10-CM

## 2023-09-27 LAB
EXPIRATION DATE: NORMAL
FLUAV AG UPPER RESP QL IA.RAPID: NOT DETECTED
FLUBV AG UPPER RESP QL IA.RAPID: NOT DETECTED
INTERNAL CONTROL: NORMAL
Lab: 8208
SARS-COV-2 AG UPPER RESP QL IA.RAPID: NOT DETECTED
SARS-COV-2 RNA RESP QL NAA+PROBE: NOT DETECTED

## 2023-09-27 PROCEDURE — 87428 SARSCOV & INF VIR A&B AG IA: CPT | Performed by: NURSE PRACTITIONER

## 2023-09-27 PROCEDURE — 87635 SARS-COV-2 COVID-19 AMP PRB: CPT | Performed by: NURSE PRACTITIONER

## 2023-09-27 NOTE — TELEPHONE ENCOUNTER
Patient stated she is getting to were she gets hot and gets chills   Patient stated that it seems to be more happening at night.   Its happening every night.     She is scared that her blood sugars are getting high and getting low   She is currently taking the metformin   But she isn't doing the Tripeptide   She doesn't have anything where she can check her blood sugar       Patient has an appt today with a nurse visit.

## 2023-09-28 ENCOUNTER — TELEPHONE (OUTPATIENT)
Dept: INTERNAL MEDICINE | Facility: CLINIC | Age: 53
End: 2023-09-28

## 2023-09-28 DIAGNOSIS — E11.65 TYPE 2 DIABETES MELLITUS WITH HYPERGLYCEMIA, WITHOUT LONG-TERM CURRENT USE OF INSULIN: Primary | ICD-10-CM

## 2023-09-28 NOTE — TELEPHONE ENCOUNTER
Patient is wanting to have a blood sugar reader and supplies sent in to her pharmacy so she can check her blood sugar at home.

## 2023-10-03 NOTE — TELEPHONE ENCOUNTER
Hub staff attempted to follow warm transfer process and was unsuccessful     Caller: Holly Apple    Relationship to patient: Self    Best call back number: [phone]    Patient is needing: PATIENT IS AT [facility] RIGHT NOW AND THEY DO NOT HAVE THE BLOOD SUGAR TESTING SUPPLIES.

## 2023-10-04 ENCOUNTER — TELEPHONE (OUTPATIENT)
Dept: INTERNAL MEDICINE | Facility: CLINIC | Age: 53
End: 2023-10-04
Payer: COMMERCIAL

## 2023-10-04 RX ORDER — BLOOD-GLUCOSE METER
1 KIT MISCELLANEOUS DAILY
Qty: 1 EACH | Refills: 0 | Status: SHIPPED | OUTPATIENT
Start: 2023-10-04

## 2023-10-04 RX ORDER — LANCETS
EACH MISCELLANEOUS
Qty: 102 EACH | Refills: 12 | Status: SHIPPED | OUTPATIENT
Start: 2023-10-04

## 2023-10-04 NOTE — TELEPHONE ENCOUNTER
Patient called and stated her lanlord has faxed over disability paperwork requesting it be signed as they are wanting to spray her house for bugs but she has sinus issues and requesting that they do not. I have uploaded the do

## 2023-10-25 DIAGNOSIS — I10 ESSENTIAL HYPERTENSION: ICD-10-CM

## 2023-10-25 RX ORDER — LOSARTAN POTASSIUM AND HYDROCHLOROTHIAZIDE 12.5; 1 MG/1; MG/1
TABLET ORAL
Qty: 30 TABLET | Refills: 1 | Status: SHIPPED | OUTPATIENT
Start: 2023-10-25

## 2023-12-01 ENCOUNTER — OFFICE VISIT (OUTPATIENT)
Dept: INTERNAL MEDICINE | Facility: CLINIC | Age: 53
End: 2023-12-01
Payer: COMMERCIAL

## 2023-12-01 VITALS
SYSTOLIC BLOOD PRESSURE: 144 MMHG | WEIGHT: 195 LBS | DIASTOLIC BLOOD PRESSURE: 75 MMHG | HEART RATE: 90 BPM | OXYGEN SATURATION: 96 % | TEMPERATURE: 98 F | HEIGHT: 64 IN | BODY MASS INDEX: 33.29 KG/M2

## 2023-12-01 DIAGNOSIS — N95.1 HOT FLASHES DUE TO MENOPAUSE: ICD-10-CM

## 2023-12-01 DIAGNOSIS — E66.9 CLASS 1 OBESITY WITH SERIOUS COMORBIDITY AND BODY MASS INDEX (BMI) OF 33.0 TO 33.9 IN ADULT, UNSPECIFIED OBESITY TYPE: ICD-10-CM

## 2023-12-01 DIAGNOSIS — I10 ESSENTIAL HYPERTENSION: Primary | ICD-10-CM

## 2023-12-01 DIAGNOSIS — Z23 NEED FOR VACCINATION: ICD-10-CM

## 2023-12-01 DIAGNOSIS — E78.2 MIXED HYPERLIPIDEMIA: ICD-10-CM

## 2023-12-01 DIAGNOSIS — H69.91 EUSTACHIAN TUBE DYSFUNCTION, RIGHT: ICD-10-CM

## 2023-12-01 DIAGNOSIS — E11.65 TYPE 2 DIABETES MELLITUS WITH HYPERGLYCEMIA, WITHOUT LONG-TERM CURRENT USE OF INSULIN: ICD-10-CM

## 2023-12-01 LAB
ALBUMIN SERPL-MCNC: 4.6 G/DL (ref 3.5–5.2)
ALBUMIN/GLOB SERPL: 1.5 G/DL
ALP SERPL-CCNC: 68 U/L (ref 39–117)
ALT SERPL W P-5'-P-CCNC: 36 U/L (ref 1–33)
ANION GAP SERPL CALCULATED.3IONS-SCNC: 11.3 MMOL/L (ref 5–15)
AST SERPL-CCNC: 25 U/L (ref 1–32)
BASOPHILS # BLD AUTO: 0.03 10*3/MM3 (ref 0–0.2)
BASOPHILS NFR BLD AUTO: 0.6 % (ref 0–1.5)
BILIRUB SERPL-MCNC: 0.6 MG/DL (ref 0–1.2)
BUN SERPL-MCNC: 15 MG/DL (ref 6–20)
BUN/CREAT SERPL: 13.6 (ref 7–25)
CALCIUM SPEC-SCNC: 9.8 MG/DL (ref 8.6–10.5)
CHLORIDE SERPL-SCNC: 98 MMOL/L (ref 98–107)
CHOLEST SERPL-MCNC: 170 MG/DL (ref 0–200)
CO2 SERPL-SCNC: 27.7 MMOL/L (ref 22–29)
CREAT SERPL-MCNC: 1.1 MG/DL (ref 0.57–1)
DEPRECATED RDW RBC AUTO: 35.3 FL (ref 37–54)
EGFRCR SERPLBLD CKD-EPI 2021: 59.5 ML/MIN/1.73
EOSINOPHIL # BLD AUTO: 0.08 10*3/MM3 (ref 0–0.4)
EOSINOPHIL NFR BLD AUTO: 1.5 % (ref 0.3–6.2)
ERYTHROCYTE [DISTWIDTH] IN BLOOD BY AUTOMATED COUNT: 12.7 % (ref 12.3–15.4)
GLOBULIN UR ELPH-MCNC: 3.1 GM/DL
GLUCOSE SERPL-MCNC: 91 MG/DL (ref 65–99)
HBA1C MFR BLD: 6.3 % (ref 4.8–5.6)
HCT VFR BLD AUTO: 44.8 % (ref 34–46.6)
HDLC SERPL-MCNC: 54 MG/DL (ref 40–60)
HGB BLD-MCNC: 14.1 G/DL (ref 12–15.9)
IMM GRANULOCYTES # BLD AUTO: 0.01 10*3/MM3 (ref 0–0.05)
IMM GRANULOCYTES NFR BLD AUTO: 0.2 % (ref 0–0.5)
LDLC SERPL CALC-MCNC: 97 MG/DL (ref 0–100)
LDLC/HDLC SERPL: 1.76 {RATIO}
LYMPHOCYTES # BLD AUTO: 2.1 10*3/MM3 (ref 0.7–3.1)
LYMPHOCYTES NFR BLD AUTO: 39.5 % (ref 19.6–45.3)
MCH RBC QN AUTO: 24.3 PG (ref 26.6–33)
MCHC RBC AUTO-ENTMCNC: 31.5 G/DL (ref 31.5–35.7)
MCV RBC AUTO: 77.2 FL (ref 79–97)
MONOCYTES # BLD AUTO: 0.3 10*3/MM3 (ref 0.1–0.9)
MONOCYTES NFR BLD AUTO: 5.6 % (ref 5–12)
NEUTROPHILS NFR BLD AUTO: 2.79 10*3/MM3 (ref 1.7–7)
NEUTROPHILS NFR BLD AUTO: 52.6 % (ref 42.7–76)
NRBC BLD AUTO-RTO: 0 /100 WBC (ref 0–0.2)
PLATELET # BLD AUTO: 305 10*3/MM3 (ref 140–450)
PMV BLD AUTO: 9 FL (ref 6–12)
POTASSIUM SERPL-SCNC: 4.1 MMOL/L (ref 3.5–5.2)
PROT SERPL-MCNC: 7.7 G/DL (ref 6–8.5)
RBC # BLD AUTO: 5.8 10*6/MM3 (ref 3.77–5.28)
SODIUM SERPL-SCNC: 137 MMOL/L (ref 136–145)
TRIGL SERPL-MCNC: 105 MG/DL (ref 0–150)
VLDLC SERPL-MCNC: 19 MG/DL (ref 5–40)
WBC NRBC COR # BLD AUTO: 5.31 10*3/MM3 (ref 3.4–10.8)

## 2023-12-01 PROCEDURE — 85025 COMPLETE CBC W/AUTO DIFF WBC: CPT | Performed by: NURSE PRACTITIONER

## 2023-12-01 PROCEDURE — 83036 HEMOGLOBIN GLYCOSYLATED A1C: CPT | Performed by: NURSE PRACTITIONER

## 2023-12-01 PROCEDURE — 80061 LIPID PANEL: CPT | Performed by: NURSE PRACTITIONER

## 2023-12-01 PROCEDURE — 80053 COMPREHEN METABOLIC PANEL: CPT | Performed by: NURSE PRACTITIONER

## 2023-12-01 RX ORDER — ROSUVASTATIN CALCIUM 5 MG/1
5 TABLET, COATED ORAL DAILY
Qty: 90 TABLET | Refills: 0 | Status: SHIPPED | OUTPATIENT
Start: 2023-12-01

## 2023-12-01 RX ORDER — DIPHENOXYLATE HYDROCHLORIDE AND ATROPINE SULFATE 2.5; .025 MG/1; MG/1
TABLET ORAL DAILY
COMMUNITY

## 2023-12-01 RX ORDER — ALCLOMETASONE DIPROPIONATE 0.5 MG/G
OINTMENT TOPICAL
COMMUNITY
Start: 2023-11-28

## 2023-12-01 RX ORDER — DULOXETIN HYDROCHLORIDE 20 MG/1
20 CAPSULE, DELAYED RELEASE ORAL DAILY
Qty: 30 CAPSULE | Refills: 1 | Status: SHIPPED | OUTPATIENT
Start: 2023-12-01

## 2023-12-01 RX ORDER — BLOOD-GLUCOSE METER
KIT MISCELLANEOUS
COMMUNITY
Start: 2023-10-04

## 2023-12-01 RX ORDER — FLUTICASONE PROPIONATE 50 MCG
2 SPRAY, SUSPENSION (ML) NASAL DAILY
Qty: 16 G | Refills: 0 | Status: SHIPPED | OUTPATIENT
Start: 2023-12-01

## 2023-12-01 NOTE — PROGRESS NOTES
Chief Complaint  Hypertension (Concerned for low HR ), Diabetes, Earache (Right), and Hot Flashes    Subjective          Holly Apple presents to [facility]  Hyperlipidemia  This is a chronic problem. Exacerbating diseases include diabetes and obesity. She has no history of chronic renal disease, hypothyroidism, liver disease or nephrotic syndrome. Pertinent negatives include no chest pain, focal sensory loss, focal weakness, leg pain, myalgias or shortness of breath.   Hypertension  This is a chronic problem. Associated symptoms include anxiety. Pertinent negatives include no blurred vision, chest pain, headaches, malaise/fatigue, neck pain, orthopnea, palpitations, peripheral edema, PND, shortness of breath or sweats. Compliance problems include diet and exercise.  There is no history of angina, kidney disease, CAD/MI, CVA, heart failure, left ventricular hypertrophy or PVD. There is no history of chronic renal disease.   Diabetes  She presents for her initial diabetic visit. She has type 2 diabetes mellitus. Hypoglycemia symptoms include nervousness/anxiousness. Pertinent negatives for hypoglycemia include no confusion, dizziness, headaches, hunger, mood changes, pallor, seizures, sleepiness, speech difficulty, sweats or tremors. There are no diabetic associated symptoms. Pertinent negatives for diabetes include no blurred vision, no chest pain, no fatigue, no foot paresthesias, no foot ulcerations, no polydipsia, no polyphagia, no polyuria, no visual change, no weakness and no weight loss. There are no hypoglycemic complications. Symptoms are stable. There are no diabetic complications. Pertinent negatives for diabetic complications include no CVA, impotence or PVD. Risk factors for coronary artery disease include dyslipidemia, family history, obesity, hypertension, stress and post-menopausal.   Anxiety  Presents for follow-up visit. Symptoms include decreased  concentration, depressed mood, irritability and nervous/anxious behavior. Patient reports no chest pain, compulsions, confusion, dizziness, dry mouth, excessive worry, feeling of choking, hyperventilation, impotence, insomnia, malaise, muscle tension, nausea, obsessions, palpitations, panic, restlessness, shortness of breath or suicidal ideas.       Obesity  This is a chronic problem. Pertinent negatives include no abdominal pain, anorexia, arthralgias, change in bowel habit, chest pain, chills, congestion, coughing, diaphoresis, fatigue, fever, headaches, joint swelling, myalgias, nausea, neck pain, numbness, rash, sore throat, swollen glands, urinary symptoms, vertigo, visual change, vomiting or weakness.     Going to dermatologist every 3 months for her skin.   Rosacea.         Current Outpatient Medications   Medication Instructions    alclometasone (ACLOVATE) 0.05 % ointment     Blood Glucose Monitoring Suppl (FreeStyle Lite) w/Device kit     cholecalciferol (VITAMIN D3) 1,000 Units, Oral, Daily    CRANBERRY PO Oral    DULoxetine (CYMBALTA) 20 mg, Oral, Daily    EPINEPHrine (EPIPEN) 0.3 mg, Intramuscular, Once As Needed    fexofenadine (ALLEGRA) 180 mg, Nightly    fluorometholone (FML) 0.1 % ophthalmic suspension     fluticasone (FLONASE) 50 MCG/ACT nasal spray 2 sprays, Nasal, Daily    glucose blood test strip Daily in the AM, fasting    glucose monitor monitoring kit 1 each, Does not apply, Daily    Lancets (accu-chek multiclix) lancets Use one daily in the AM, fasting    losartan-hydrochlorothiazide (HYZAAR) 100-12.5 MG per tablet TAKE 1 TABLET BY MOUTH EVERY DAY    metFORMIN (GLUCOPHAGE) 500 mg, Oral, 2 Times Daily With Meals    metroNIDAZOLE (FLAGYL) 500 mg, Oral, 2 Times Daily    multivitamin (MULTIVITAMIN PO) Oral, Daily    Omega-3 Fatty Acids (FISH OIL PO) Oral    Polyethylene Glycol 400 (BLINK TEARS OP) Apply  to eye(s) as directed by provider.    rosuvastatin (CRESTOR) 5 mg, Oral, Daily     "saccharomyces boulardii (FLORASTOR) 250 mg, Oral, 2 Times Daily    Tetrahydroz-Polyvinyl Al-Povid (CLEAR EYES TRIPLE ACTION OP) Apply  to eye(s) as directed by provider.    Tirzepatide 5 mg, Subcutaneous, Weekly    TobraDex 0.3-0.1 % ophthalmic suspension INSTILL 1 DRIP INTO RIGHT EYE 3 TIMES A DAY FOR 2 WEEKS THEN STOP    triamcinolone (KENALOG) 0.1 % ointment 1 application , Topical, 2 Times Daily       The following portions of the patient's history were reviewed and updated as appropriate: allergies, current medications, past family history, past medical history, past social history, past surgical history, and problem list.    Objective   Vital Signs:   /75 (BP Location: Left arm, Patient Position: Sitting, Cuff Size: Adult)   Pulse 90   Temp 98 °F (36.7 °C) (Temporal)   Ht 162.6 cm (64\")   Wt 88.5 kg (195 lb)   SpO2 96%   BMI 33.47 kg/m²     BP Readings from Last 3 Encounters:   12/01/23 144/75   08/30/23 138/84   08/02/23 128/85     Wt Readings from Last 3 Encounters:   12/01/23 88.5 kg (195 lb)   08/30/23 98.9 kg (218 lb 2 oz)   08/02/23 96.6 kg (213 lb)           Physical Exam  Constitutional:       Appearance: She is obese.          Appearance: No acute distress, well-nourished  Head: normocephalic, atraumatic  Eyes: extraocular movements intact, no scleral icterus, no conjunctival injection  Ears, Nose, and Throat: external ears normal, nares patent, moist mucous membranes  Cardiovascular: regular rate and rhythm. no murmurs, rubs, or gallops. no edema  Respiratory: breathing comfortably, symmetric chest rise, clear to auscultation bilaterally. No wheezes, rales, or rhonchi.  Neuro: alert and oriented to time, place, and person. Normal gait  Psych: normal mood and affect     Result Review :   The following data was reviewed by: KASSANDRA Wilder on 12/01/2023:  Common labs          8/2/2023    16:02 8/30/2023    15:56 12/1/2023    17:00   Common Labs   Glucose 139  223  91    BUN 16  12 "  15    Creatinine 0.83  0.92  1.10    Sodium 137  137  137    Potassium 3.8  3.8  4.1    Chloride 100  97  98    Calcium 10.0  9.4  9.8    Albumin 4.4  4.3  4.6    Total Bilirubin 0.3  0.4  0.6    Alkaline Phosphatase 74  77  68    AST (SGOT) 26  32  25    ALT (SGPT) 46  53  36    WBC  6.57  5.31    Hemoglobin  14.4  14.1    Hematocrit  44.2  44.8    Platelets  289  305    Total Cholesterol   170    Triglycerides   105    HDL Cholesterol   54    LDL Cholesterol    97    Hemoglobin A1C  7.10  6.30             Lab Results   Component Value Date    SARSANTIGEN Not Detected 09/27/2023    COVID19 Not Detected 09/27/2023    RAPFLUA Negative 05/09/2022    RAPFLUB Negative 05/09/2022    FLUAAG Not Detected 09/27/2023    FLUBAG Not Detected 09/27/2023    RAPSCRN Negative 04/12/2023    INR 1.0 02/27/2023    BILIRUBINUR Negative 06/26/2023       Procedures        Assessment and Plan    Diagnoses and all orders for this visit:    1. Essential hypertension (Primary)  Assessment & Plan:  Hypertension is improving with treatment.  Continue current treatment regimen.  Dietary sodium restriction.  Weight loss.  Regular aerobic exercise.  Continue current medications.  Ambulatory blood pressure monitoring.  Blood pressure will be reassessed in 3 months.    Orders:  -     CBC w AUTO Differential  -     Comprehensive metabolic panel  -     Microalbumin / Creatinine Urine Ratio - Urine, Clean Catch; Future    2. Mixed hyperlipidemia  Assessment & Plan:  Lipid abnormalities are  will recheck lipids today  .  Nutritional counseling was provided. and Pharmacotherapy as ordered.  Lipids will be reassessed in 3 months.    Orders:  -     rosuvastatin (Crestor) 5 MG tablet; Take 1 tablet by mouth Daily.  Dispense: 90 tablet; Refill: 0  -     Lipid panel  -     Microalbumin / Creatinine Urine Ratio - Urine, Clean Catch; Future    3. Type 2 diabetes mellitus with hyperglycemia, without long-term current use of insulin  Assessment &  Plan:  Diabetes is improving with treatment.   Continue current treatment regimen.  Reminded to bring in blood sugar diary at next visit.  Dietary recommendations for ADA diet.  Regular aerobic exercise.  Discussed ways to avoid symptomatic hypoglycemia.  Discussed sick day management.  Discussed foot care.  Reminded to get yearly retinal exam.  Diabetes will be reassessed in 3 months.    Orders:  -     Hemoglobin A1c  -     Cancel: Microalbumin / Creatinine Urine Ratio - Urine, Clean Catch  -     metFORMIN (GLUCOPHAGE) 500 MG tablet; Take 1 tablet by mouth 2 (Two) Times a Day With Meals.  Dispense: 180 tablet; Refill: 3  -     Tirzepatide 5 MG/0.5ML solution pen-injector; Inject 0.5 mL under the skin into the appropriate area as directed 1 (One) Time Per Week.  Dispense: 2 mL; Refill: 2  -     Microalbumin / Creatinine Urine Ratio - Urine, Clean Catch; Future    4. Need for vaccination  -     Pneumococcal Conjugate Vaccine 20-Valent (PCV20)  -     Cancel: Tdap Vaccine => 8yo IM (BOOSTRIX)  -     Microalbumin / Creatinine Urine Ratio - Urine, Clean Catch; Future    5. Hot flashes due to menopause  -     DULoxetine (Cymbalta) 20 MG capsule; Take 1 capsule by mouth Daily.  Dispense: 30 capsule; Refill: 1  -     Microalbumin / Creatinine Urine Ratio - Urine, Clean Catch; Future    6. Eustachian tube dysfunction, right  -     fluticasone (FLONASE) 50 MCG/ACT nasal spray; 2 sprays into the nostril(s) as directed by provider Daily.  Dispense: 16 g; Refill: 0  -     Microalbumin / Creatinine Urine Ratio - Urine, Clean Catch; Future    7. Class 1 obesity with serious comorbidity and body mass index (BMI) of 33.0 to 33.9 in adult, unspecified obesity type  Assessment & Plan:  Patient's (Body mass index is 33.47 kg/m².) indicates that they are obese (BMI >30) with health conditions that include hypertension, diabetes mellitus, and dyslipidemias . Weight is  improving with treatment  . BMI  is above average; BMI management  plan is completed. We discussed low calorie, low carb based diet program, portion control, and increasing exercise.             Medications Discontinued During This Encounter   Medication Reason    rosuvastatin (Crestor) 5 MG tablet Reorder    Tirzepatide (Mounjaro) 2.5 MG/0.5ML solution pen-injector Historical Med - Therapy completed    Tirzepatide 5 MG/0.5ML solution pen-injector Reorder    metFORMIN (GLUCOPHAGE) 500 MG tablet Reorder          Follow Up   Return in about 3 months (around 3/1/2024) for Hypertension, Diabetes, Hyperlipidemia, Weight; HOT FLASHES .  Patient was given instructions and counseling regarding her condition or for health maintenance advice. Please see specific information pulled into the AVS if appropriate.       Isa Wolf, KASSANDRA  12/07/23  07:43 EST

## 2023-12-01 NOTE — LETTER
[facility]  Vaccine Consent Form    Patient Name:  Holly Apple  Patient :  1970     Vaccine(s) Ordered    Pneumococcal Conjugate Vaccine 20-Valent (PCV20)  Tdap Vaccine => 8yo IM (BOOSTRIX)        Screening Checklist  The following questions should be completed prior to vaccination. If you answer “yes” to any question, it does not necessarily mean you should not be vaccinated. It just means we may need to clarify or ask more questions. If a question is unclear, please ask your healthcare provider to explain it.    Yes No   Any fever or moderate to severe illness today (mild illness and/or antibiotic treatment are not contraindications)?     Do you have a history of a serious reaction to any previous vaccinations, such as anaphylaxis, encephalopathy within 7 days, Guillain-Fairmount syndrome within 6 weeks, seizure?     Have you received any live vaccine(s) (e.g MMR, ADRIAN) or any other vaccines in the last month (to ensure duplicate doses aren't given)?     Do you have an anaphylactic allergy to latex (DTaP, DTaP-IPV, Hep A, Hep B, MenB, RV, Td, Tdap), baker’s yeast (Hep B, HPV), polysorbates (RSV, nirsevimab, PCV 20, Rotavirrus, Tdap, Shingrix), or gelatin (ADRIAN, MMR)?     Do you have an anaphylactic allergy to neomycin (Rabies, ADRIAN, MMR, IPV, Hep A), polymyxin B (IPV), or streptomycin (IPV)?      Any cancer, leukemia, AIDS, or other immune system disorder? (ADRIAN, MMR, RV)     Do you have a parent, brother, or sister with an immune system problem (if immune competence of vaccine recipient clinically verified, can proceed)? (MMR, ADRIAN)     Any recent steroid treatments for >2 weeks, chemotherapy, or radiation treatment? (ADRIAN, MMR)     Have you received antibody-containing blood transfusions or IVIG in the past 11 months (recommended interval is dependent on product)? (MMR, ADRIAN)     Have you taken antiviral drugs (acyclovir, famciclovir, valacyclovir for ADRIAN) in the last 24 or 48 hours, respectively?      Are  you pregnant or planning to become pregnant within 1 month? (ADRIAN, MMR, HPV, IPV, MenB, Abrexvy; For Hep B- refer to Engerix-B; For RSV - Abrysvo is indicated for 32-36 weeks of pregnancy from September to January)     For infants, have you ever been told your child has had intussusception or a medical emergency involving obstruction of the intestine (Rotavirus)? If not for an infant, can skip this question.         *Ordering Physician/APC should be consulted if “yes” is checked by the patient or guardian above.      I have received, read, and understand the Vaccine Information Statement (VIS) for each vaccine ordered above.  I have considered my health status as well as the health status of my close contacts.  I have taken the opportunity to discuss my vaccine questions with my health care provider.   I have requested that the ordered vaccine(s) be given to me.  I understand the benefits and risks of the vaccines.  I understand that I should remain in the clinic for 15 minutes after receiving the vaccine(s).  _________________________________________________________  Signature of Patient or Parent/Legal Guardian ____________________  Date

## 2023-12-04 ENCOUNTER — TELEPHONE (OUTPATIENT)
Dept: INTERNAL MEDICINE | Facility: CLINIC | Age: 53
End: 2023-12-04
Payer: COMMERCIAL

## 2023-12-04 NOTE — TELEPHONE ENCOUNTER
Spoke with patient. Verified .   Made aware she needs to come in for a nurse visit for vaginal screening.   Patient is on nurse schedule for tomorrow at 2

## 2023-12-04 NOTE — TELEPHONE ENCOUNTER
Caller: Holly Apple    Relationship: Self    Best call back number:     [phone]       What medication are you requesting: SOMETHING FOR BV    What are your current symptoms: PRESSURE IN THE VAGINAL AREA    How long have you been experiencing symptoms: 1 WEEK    Have you had these symptoms before:    [] Yes  [x] No    Have you been treated for these symptoms before:   [] Yes  [x] No    If a prescription is needed, what is your preferred pharmacy and phone number: [facility] [address] [place] [address] [phone] [facility] [phone]

## 2023-12-05 ENCOUNTER — CLINICAL SUPPORT (OUTPATIENT)
Dept: INTERNAL MEDICINE | Facility: CLINIC | Age: 53
End: 2023-12-05
Payer: COMMERCIAL

## 2023-12-05 DIAGNOSIS — N89.8 ITCHING IN THE VAGINAL AREA: Primary | ICD-10-CM

## 2023-12-05 LAB
CANDIDA SPECIES: NEGATIVE
GARDNERELLA VAGINALIS: POSITIVE
T VAGINALIS DNA VAG QL PROBE+SIG AMP: NEGATIVE

## 2023-12-05 PROCEDURE — 87660 TRICHOMONAS VAGIN DIR PROBE: CPT | Performed by: NURSE PRACTITIONER

## 2023-12-05 PROCEDURE — 87510 GARDNER VAG DNA DIR PROBE: CPT | Performed by: NURSE PRACTITIONER

## 2023-12-05 PROCEDURE — 87480 CANDIDA DNA DIR PROBE: CPT | Performed by: NURSE PRACTITIONER

## 2023-12-06 ENCOUNTER — TELEPHONE (OUTPATIENT)
Dept: INTERNAL MEDICINE | Facility: CLINIC | Age: 53
End: 2023-12-06
Payer: COMMERCIAL

## 2023-12-06 DIAGNOSIS — B96.89 BV (BACTERIAL VAGINOSIS): Primary | ICD-10-CM

## 2023-12-06 DIAGNOSIS — N76.0 BV (BACTERIAL VAGINOSIS): Primary | ICD-10-CM

## 2023-12-06 RX ORDER — METRONIDAZOLE 500 MG/1
500 TABLET ORAL 2 TIMES DAILY
Qty: 14 TABLET | Refills: 0 | Status: SHIPPED | OUTPATIENT
Start: 2023-12-06 | End: 2023-12-11

## 2023-12-06 NOTE — TELEPHONE ENCOUNTER
Left message for patient to return call to clinic.    HUB TO READ:  ----- Message from KASSANDRA Wilder sent at 12/6/2023  8:10 AM EST -----  Positive for bacterial vaginosis. This is not a sexually transmitted disease. It is an overgrowth of bacteria within the vagina.   I am sending in flagyl (which is an antibiotic) x 7 days.   Avoid intercourse until completion of the antibiotic.   Also avoid alcohol while taking the Flagyl.

## 2023-12-06 NOTE — TELEPHONE ENCOUNTER
----- Message from KASSANDRA Wilder sent at 12/6/2023  8:10 AM EST -----  Positive for bacterial vaginosis. This is not a sexually transmitted disease. It is an overgrowth of bacteria within the vagina.   I am sending in flagyl (which is an antibiotic) x 7 days.   Avoid intercourse until completion of the antibiotic.   Also avoid alcohol while taking the Flagyl.

## 2023-12-06 NOTE — TELEPHONE ENCOUNTER
Caller: Holly Apple    Relationship to patient: Self    Patient is needing: INFORMED PATIENT OF MESSAGE, NO FURTHER QUESTIONS

## 2023-12-07 ENCOUNTER — TELEPHONE (OUTPATIENT)
Dept: INTERNAL MEDICINE | Facility: CLINIC | Age: 53
End: 2023-12-07
Payer: COMMERCIAL

## 2023-12-07 DIAGNOSIS — N17.9 ACUTE KIDNEY INJURY: Primary | ICD-10-CM

## 2023-12-07 PROBLEM — E78.2 MIXED HYPERLIPIDEMIA: Status: ACTIVE | Noted: 2023-12-07

## 2023-12-07 PROBLEM — E66.9 CLASS 1 OBESITY WITH SERIOUS COMORBIDITY AND BODY MASS INDEX (BMI) OF 33.0 TO 33.9 IN ADULT: Status: ACTIVE | Noted: 2023-12-07

## 2023-12-07 PROBLEM — E66.811 CLASS 1 OBESITY WITH SERIOUS COMORBIDITY AND BODY MASS INDEX (BMI) OF 33.0 TO 33.9 IN ADULT: Status: ACTIVE | Noted: 2023-12-07

## 2023-12-07 PROBLEM — N95.1 HOT FLASHES DUE TO MENOPAUSE: Status: ACTIVE | Noted: 2023-12-07

## 2023-12-07 PROBLEM — E11.65 TYPE 2 DIABETES MELLITUS WITH HYPERGLYCEMIA, WITHOUT LONG-TERM CURRENT USE OF INSULIN: Status: ACTIVE | Noted: 2023-12-07

## 2023-12-07 NOTE — ASSESSMENT & PLAN NOTE
Patient's (Body mass index is 33.47 kg/m².) indicates that they are obese (BMI >30) with health conditions that include hypertension, diabetes mellitus, and dyslipidemias . Weight is  improving with treatment  . BMI  is above average; BMI management plan is completed. We discussed low calorie, low carb based diet program, portion control, and increasing exercise.

## 2023-12-07 NOTE — TELEPHONE ENCOUNTER
Caller: Holly Apple    Relationship to patient: Self    Patient is needing: INFORMED PATIENT OF MESSAGE, TRANSFERRED TO CLINICAL STAFF FOR FURTHER QUESTIONS.

## 2023-12-07 NOTE — TELEPHONE ENCOUNTER
Attempted to contact patient. Left message to call the office back.    HUB OK TO READ/ADVISE:  Renal function took a hit. Increase water intake and repeat BMP in 1 week      A1c 6.3 - good control of DM!      Cholesterol significantly improved!! Keep up the great work!

## 2023-12-07 NOTE — TELEPHONE ENCOUNTER
Spoke with patient. Transferred from HUB.     Made aware the kidney fuction is not of concern at this moment. We are going to need her to recheck in 1 week and drink lots of water. Patient verbalized understanding

## 2023-12-07 NOTE — TELEPHONE ENCOUNTER
----- Message from KASSANDRA Wilder sent at 12/6/2023  1:30 PM EST -----  Renal function took a hit. Increase water intake and repeat BMP in 1 week     A1c 6.3 - good control of DM!     Cholesterol significantly improved!! Keep up the great work!

## 2023-12-07 NOTE — ASSESSMENT & PLAN NOTE
Lipid abnormalities are  will recheck lipids today  .  Nutritional counseling was provided. and Pharmacotherapy as ordered.  Lipids will be reassessed in 3 months.

## 2023-12-11 ENCOUNTER — OFFICE VISIT (OUTPATIENT)
Dept: OBSTETRICS AND GYNECOLOGY | Facility: CLINIC | Age: 53
End: 2023-12-11
Payer: COMMERCIAL

## 2023-12-11 VITALS
HEART RATE: 102 BPM | HEIGHT: 64 IN | DIASTOLIC BLOOD PRESSURE: 69 MMHG | BODY MASS INDEX: 34.15 KG/M2 | SYSTOLIC BLOOD PRESSURE: 103 MMHG | WEIGHT: 200 LBS

## 2023-12-11 DIAGNOSIS — N89.8 VAGINAL DISCHARGE: Primary | ICD-10-CM

## 2023-12-11 DIAGNOSIS — N89.8 VAGINAL ITCHING: ICD-10-CM

## 2023-12-11 PROCEDURE — 3078F DIAST BP <80 MM HG: CPT | Performed by: OBSTETRICS & GYNECOLOGY

## 2023-12-11 PROCEDURE — 1159F MED LIST DOCD IN RCRD: CPT | Performed by: OBSTETRICS & GYNECOLOGY

## 2023-12-11 PROCEDURE — 3074F SYST BP LT 130 MM HG: CPT | Performed by: OBSTETRICS & GYNECOLOGY

## 2023-12-11 PROCEDURE — 1160F RVW MEDS BY RX/DR IN RCRD: CPT | Performed by: OBSTETRICS & GYNECOLOGY

## 2023-12-11 PROCEDURE — 99213 OFFICE O/P EST LOW 20 MIN: CPT | Performed by: OBSTETRICS & GYNECOLOGY

## 2023-12-11 RX ORDER — METRONIDAZOLE 7.5 MG/G
GEL VAGINAL NIGHTLY
Qty: 70 G | Refills: 1 | Status: SHIPPED | OUTPATIENT
Start: 2023-12-11 | End: 2023-12-18

## 2023-12-11 NOTE — PROGRESS NOTES
"GYN Problem/Follow Up Visit    Chief Complaint   Patient presents with    Vaginal Discharge           HPI  Holly Apple is a 53 y.o. female, , who presents for vaginal d/c and itching for a couple weeks. Saw her pcp and was positive for bv on  but states the oral flagyl is not helping. No new sex partners.        Additional OB/GYN History   Patient's last menstrual period was 2021 (approximate).  Allergies : Diethyl toluamide (deet); Fluconazole; Measles, mumps & rubella vac; Wasp venom; Eggs or egg-derived products; Measles virus live vaccine; and Peanut-containing drug products     The additional following portions of the patient's history were reviewed and updated as appropriate: allergies, current medications, past family history, past medical history, past social history, past surgical history, and problem list.    Review of Systems    I have reviewed and agree with the HPI, ROS, and historical information as entered above. Tomasa Amaro, APRN    Objective   /69   Pulse 102   Ht 162.6 cm (64\")   Wt 90.7 kg (200 lb)   LMP 2021 (Approximate)   BMI 34.33 kg/m²     Physical Exam  Vitals reviewed.   Genitourinary:     General: Normal vulva.      Vagina: No signs of injury and foreign body. Vaginal discharge (scant thin white) present. No erythema, tenderness, bleeding, lesions or prolapsed vaginal walls.   Skin:     General: Skin is warm and dry.   Neurological:      Mental Status: She is alert and oriented to person, place, and time.            Assessment and Plan    Diagnoses and all orders for this visit:    1. Vaginal discharge (Primary)  -     metroNIDAZOLE (METROGEL) 0.75 % vaginal gel; Insert  into the vagina Every Night for 7 doses.  Dispense: 70 g; Refill: 1  -     NuSwab BV & Candida - Swab, Vagina    2. Vaginal itching  -     metroNIDAZOLE (METROGEL) 0.75 % vaginal gel; Insert  into the vagina Every Night for 7 doses.  Dispense: 70 g; Refill: 1  -     NuSwab BV & Candida " - Swab, Vagina    Will check for infections. Stop the oral flagyl and try the metrogel. Daily probiotics. F/u if sx persist or any new concerns.     Counseling:  She understands the importance of having the above orders performed in a timely fashion.  She is encouraged to review her results online and/or contact or office if she has questions.     Follow Up:  Return for Annual physical.      KASSANDRA Cabrera  12/11/2023

## 2023-12-12 LAB
A VAGINAE DNA VAG QL NAA+PROBE: NORMAL SCORE
BVAB2 DNA VAG QL NAA+PROBE: NORMAL SCORE
C ALBICANS DNA VAG QL NAA+PROBE: NEGATIVE
C GLABRATA DNA VAG QL NAA+PROBE: NEGATIVE
MEGA1 DNA VAG QL NAA+PROBE: NORMAL SCORE

## 2023-12-27 DIAGNOSIS — I10 ESSENTIAL HYPERTENSION: ICD-10-CM

## 2023-12-27 RX ORDER — LOSARTAN POTASSIUM AND HYDROCHLOROTHIAZIDE 12.5; 1 MG/1; MG/1
TABLET ORAL
Qty: 30 TABLET | Refills: 1 | Status: SHIPPED | OUTPATIENT
Start: 2023-12-27

## 2024-01-26 ENCOUNTER — LAB (OUTPATIENT)
Dept: LAB | Facility: HOSPITAL | Age: 54
End: 2024-01-26
Payer: COMMERCIAL

## 2024-01-26 DIAGNOSIS — E11.65 TYPE 2 DIABETES MELLITUS WITH HYPERGLYCEMIA, WITHOUT LONG-TERM CURRENT USE OF INSULIN: ICD-10-CM

## 2024-01-26 DIAGNOSIS — N95.1 HOT FLASHES DUE TO MENOPAUSE: ICD-10-CM

## 2024-01-26 DIAGNOSIS — N17.9 ACUTE KIDNEY INJURY: ICD-10-CM

## 2024-01-26 DIAGNOSIS — E78.2 MIXED HYPERLIPIDEMIA: ICD-10-CM

## 2024-01-26 DIAGNOSIS — I10 ESSENTIAL HYPERTENSION: ICD-10-CM

## 2024-01-26 DIAGNOSIS — H69.91 EUSTACHIAN TUBE DYSFUNCTION, RIGHT: ICD-10-CM

## 2024-01-26 DIAGNOSIS — Z23 NEED FOR VACCINATION: ICD-10-CM

## 2024-01-26 LAB
ALBUMIN UR-MCNC: <1.2 MG/DL
ANION GAP SERPL CALCULATED.3IONS-SCNC: 12.8 MMOL/L (ref 5–15)
BUN SERPL-MCNC: 17 MG/DL (ref 6–20)
BUN/CREAT SERPL: 15.9 (ref 7–25)
CALCIUM SPEC-SCNC: 10.1 MG/DL (ref 8.6–10.5)
CHLORIDE SERPL-SCNC: 96 MMOL/L (ref 98–107)
CO2 SERPL-SCNC: 29.2 MMOL/L (ref 22–29)
CREAT SERPL-MCNC: 1.07 MG/DL (ref 0.57–1)
CREAT UR-MCNC: 148.5 MG/DL
EGFRCR SERPLBLD CKD-EPI 2021: 61.5 ML/MIN/1.73
GLUCOSE SERPL-MCNC: 155 MG/DL (ref 65–99)
MICROALBUMIN/CREAT UR: NORMAL MG/G{CREAT}
POTASSIUM SERPL-SCNC: 3.7 MMOL/L (ref 3.5–5.2)
SODIUM SERPL-SCNC: 138 MMOL/L (ref 136–145)

## 2024-01-26 PROCEDURE — 36415 COLL VENOUS BLD VENIPUNCTURE: CPT

## 2024-01-26 PROCEDURE — 82043 UR ALBUMIN QUANTITATIVE: CPT

## 2024-01-26 PROCEDURE — 82570 ASSAY OF URINE CREATININE: CPT

## 2024-01-26 PROCEDURE — 80048 BASIC METABOLIC PNL TOTAL CA: CPT

## 2024-02-01 DIAGNOSIS — E11.65 TYPE 2 DIABETES MELLITUS WITH HYPERGLYCEMIA, WITHOUT LONG-TERM CURRENT USE OF INSULIN: ICD-10-CM

## 2024-02-01 RX ORDER — TIRZEPATIDE 5 MG/.5ML
INJECTION, SOLUTION SUBCUTANEOUS
Qty: 2 ML | Refills: 2 | Status: SHIPPED | OUTPATIENT
Start: 2024-02-01

## 2024-02-02 ENCOUNTER — TELEPHONE (OUTPATIENT)
Dept: INTERNAL MEDICINE | Facility: CLINIC | Age: 54
End: 2024-02-02
Payer: COMMERCIAL

## 2024-02-02 ENCOUNTER — OFFICE VISIT (OUTPATIENT)
Dept: INTERNAL MEDICINE | Facility: CLINIC | Age: 54
End: 2024-02-02
Payer: COMMERCIAL

## 2024-02-02 VITALS
SYSTOLIC BLOOD PRESSURE: 119 MMHG | BODY MASS INDEX: 31.86 KG/M2 | HEIGHT: 64 IN | WEIGHT: 186.6 LBS | OXYGEN SATURATION: 96 % | TEMPERATURE: 98 F | DIASTOLIC BLOOD PRESSURE: 61 MMHG | HEART RATE: 90 BPM

## 2024-02-02 DIAGNOSIS — I10 ESSENTIAL HYPERTENSION: Primary | ICD-10-CM

## 2024-02-02 DIAGNOSIS — E11.65 TYPE 2 DIABETES MELLITUS WITH HYPERGLYCEMIA, WITHOUT LONG-TERM CURRENT USE OF INSULIN: ICD-10-CM

## 2024-02-02 DIAGNOSIS — J01.00 ACUTE NON-RECURRENT MAXILLARY SINUSITIS: ICD-10-CM

## 2024-02-02 PROCEDURE — 99214 OFFICE O/P EST MOD 30 MIN: CPT | Performed by: NURSE PRACTITIONER

## 2024-02-02 PROCEDURE — 1160F RVW MEDS BY RX/DR IN RCRD: CPT | Performed by: NURSE PRACTITIONER

## 2024-02-02 PROCEDURE — 1159F MED LIST DOCD IN RCRD: CPT | Performed by: NURSE PRACTITIONER

## 2024-02-02 PROCEDURE — 3078F DIAST BP <80 MM HG: CPT | Performed by: NURSE PRACTITIONER

## 2024-02-02 PROCEDURE — 3074F SYST BP LT 130 MM HG: CPT | Performed by: NURSE PRACTITIONER

## 2024-02-02 RX ORDER — LOSARTAN POTASSIUM AND HYDROCHLOROTHIAZIDE 12.5; 5 MG/1; MG/1
1 TABLET ORAL DAILY
Qty: 90 TABLET | Refills: 0 | Status: SHIPPED | OUTPATIENT
Start: 2024-02-02

## 2024-02-02 RX ORDER — AMOXICILLIN AND CLAVULANATE POTASSIUM 875; 125 MG/1; MG/1
1 TABLET, FILM COATED ORAL 2 TIMES DAILY
Qty: 14 TABLET | Refills: 0 | Status: SHIPPED | OUTPATIENT
Start: 2024-02-02 | End: 2024-02-09

## 2024-02-02 NOTE — TELEPHONE ENCOUNTER
----- Message from Jimmy Kelly Jr., MD sent at 2/2/2024 11:25 AM EST -----  All labs reassuring   Detail Level: Simple Render Risk Assessment In Note?: no Additional Notes: Pt in the past has had AA and is now resolving as hair is coming back. Told pt and mom if AA coming back to let us know for treatment

## 2024-02-02 NOTE — PROGRESS NOTES
Chief Complaint  6 month f/u HTN    Subjective          Hollyderek Apple is a 53 year old female that presents to [facility] for 6 month f/u of HTN.   BP looks really good today but she states she has had an occasional fluttery feeling in her chest. She denies feeling like she is going to pass out, dizziness, chest pain or headaches. She has lost weight recently due to being on mounjouro and feels like her medication may need to be decreased. She was 218 lbs in August, today she is 186. She does not check her blood pressure at home. She does keep up with her heart rate. She is scheduling an appt to follow up with cardiology.     Today she also c/o right side sinus pain/pressure for almost 2 weeks. She denies fever or chills. Taking allegra, not using her flonase.             History of Present Illness      Current Outpatient Medications   Medication Instructions    alclometasone (ACLOVATE) 0.05 % ointment     amoxicillin-clavulanate (AUGMENTIN) 875-125 MG per tablet 1 tablet, Oral, 2 Times Daily    Blood Glucose Monitoring Suppl (FreeStyle Lite) w/Device kit     cholecalciferol (VITAMIN D3) 1,000 Units, Oral, Daily    CRANBERRY PO Oral    DULoxetine (CYMBALTA) 20 mg, Oral, Daily    EPINEPHrine (EPIPEN) 0.3 mg, Intramuscular, Once As Needed    fexofenadine (ALLEGRA) 180 mg, Oral, Nightly    fluorometholone (FML) 0.1 % ophthalmic suspension     fluticasone (FLONASE) 50 MCG/ACT nasal spray 2 sprays, Nasal, Daily    glucose blood test strip Daily in the AM, fasting    glucose monitor monitoring kit 1 each, Does not apply, Daily    Lancets (accu-chek multiclix) lancets Use one daily in the AM, fasting    losartan-hydrochlorothiazide (Hyzaar) 50-12.5 MG per tablet 1 tablet, Oral, Daily    metFORMIN (GLUCOPHAGE) 500 mg, Oral, 2 Times Daily With Meals    multivitamin (MULTIVITAMIN PO) Oral, Daily    Omega-3 Fatty Acids (FISH OIL PO) Oral    Polyethylene Glycol 400 (BLINK TEARS  "OP) Apply  to eye(s) as directed by provider.    rosuvastatin (CRESTOR) 5 mg, Oral, Daily    saccharomyces boulardii (FLORASTOR) 250 mg, Oral, 2 Times Daily    Tetrahydroz-Polyvinyl Al-Povid (CLEAR EYES TRIPLE ACTION OP) Apply  to eye(s) as directed by provider.    Tirzepatide (Mounjaro) 5 MG/0.5ML solution pen-injector pen INJECT 5MG SUBCUTANEOUSLY INTO THE APPROPRIATE AREA AS DIRECTED ONCE WEEKLY    TobraDex 0.3-0.1 % ophthalmic suspension INSTILL 1 DRIP INTO RIGHT EYE 3 TIMES A DAY FOR 2 WEEKS THEN STOP    triamcinolone (KENALOG) 0.1 % ointment 1 application , Topical, 2 Times Daily       The following portions of the patient's history were reviewed and updated as appropriate: allergies, current medications, past family history, past medical history, past social history, past surgical history, and problem list.    Objective   Vital Signs:   /61 (BP Location: Left arm, Patient Position: Sitting, Cuff Size: Adult)   Pulse 90   Temp 98 °F (36.7 °C) (Temporal)   Ht 162.6 cm (64\")   Wt 84.6 kg (186 lb 9.6 oz)   SpO2 96%   BMI 32.03 kg/m²     BP Readings from Last 3 Encounters:   02/02/24 119/61   12/11/23 103/69   12/01/23 144/75     Wt Readings from Last 3 Encounters:   02/02/24 84.6 kg (186 lb 9.6 oz)   12/11/23 90.7 kg (200 lb)   12/01/23 88.5 kg (195 lb)           Physical Exam  Vitals and nursing note reviewed.   Constitutional:       Appearance: Normal appearance. She is obese. She is not ill-appearing.   HENT:      Head: Normocephalic and atraumatic.      Right Ear: External ear normal.      Left Ear: External ear normal.   Eyes:      Conjunctiva/sclera: Conjunctivae normal.      Pupils: Pupils are equal, round, and reactive to light.   Cardiovascular:      Rate and Rhythm: Normal rate and regular rhythm.      Heart sounds: Normal heart sounds.   Pulmonary:      Effort: Pulmonary effort is normal.      Breath sounds: Normal breath sounds.   Skin:     General: Skin is warm and dry.   Neurological:    "   Mental Status: She is alert and oriented to person, place, and time.   Psychiatric:         Mood and Affect: Mood normal.         Behavior: Behavior normal.              Result Review :   The following data was reviewed by: KASSANDRA Liang on 02/02/2024:  Common labs          8/30/2023    15:56 12/1/2023    17:00 1/26/2024    16:34 1/26/2024    16:42   Common Labs   Glucose 223  91  155     BUN 12  15  17     Creatinine 0.92  1.10  1.07     Sodium 137  137  138     Potassium 3.8  4.1  3.7     Chloride 97  98  96     Calcium 9.4  9.8  10.1     Albumin 4.3  4.6      Total Bilirubin 0.4  0.6      Alkaline Phosphatase 77  68      AST (SGOT) 32  25      ALT (SGPT) 53  36      WBC 6.57  5.31      Hemoglobin 14.4  14.1      Hematocrit 44.2  44.8      Platelets 289  305      Total Cholesterol  170      Triglycerides  105      HDL Cholesterol  54      LDL Cholesterol   97      Hemoglobin A1C 7.10  6.30      Microalbumin, Urine    <1.2             Lab Results   Component Value Date    SARSANTIGEN Not Detected 09/27/2023    COVID19 Not Detected 09/27/2023    RAPFLUA Negative 05/09/2022    RAPFLUB Negative 05/09/2022    FLUAAG Not Detected 09/27/2023    FLUBAG Not Detected 09/27/2023    RAPSCRN Negative 04/12/2023    INR 1.0 02/27/2023    BILIRUBINUR Negative 06/26/2023       Procedures        Assessment and Plan    Diagnoses and all orders for this visit:    1. Essential hypertension (Primary)  Comments:  we will decrease hyzaar. pt advised to monitor blood pressure and keep a log to bring in for her next visit.  Orders:  -     losartan-hydrochlorothiazide (Hyzaar) 50-12.5 MG per tablet; Take 1 tablet by mouth Daily.  Dispense: 90 tablet; Refill: 0    2. Type 2 diabetes mellitus with hyperglycemia, without long-term current use of insulin  Comments:  stable. continue with current regimen.    3. Acute non-recurrent maxillary sinusitis  Comments:  advised to resume flonase and perform sinus rinse with distilled  water.  Orders:  -     amoxicillin-clavulanate (AUGMENTIN) 875-125 MG per tablet; Take 1 tablet by mouth 2 (Two) Times a Day for 7 days.  Dispense: 14 tablet; Refill: 0          Medications Discontinued During This Encounter   Medication Reason    losartan-hydrochlorothiazide (HYZAAR) 100-12.5 MG per tablet           Follow Up   Return in about 3 months (around 5/2/2024) for Recheck HTN after medication change.  Patient was given instructions and counseling regarding her condition or for health maintenance advice. Please see specific information pulled into the AVS if appropriate.     Call the office if you have any issues with your blood pressure before your next visit.     KASSANDRA Liang  02/02/24  15:03 EST

## 2024-02-28 DIAGNOSIS — I10 ESSENTIAL HYPERTENSION: ICD-10-CM

## 2024-02-28 RX ORDER — LOSARTAN POTASSIUM AND HYDROCHLOROTHIAZIDE 12.5; 1 MG/1; MG/1
TABLET ORAL
Qty: 30 TABLET | Refills: 1 | OUTPATIENT
Start: 2024-02-28

## 2024-04-14 DIAGNOSIS — I10 ESSENTIAL HYPERTENSION: ICD-10-CM

## 2024-04-15 ENCOUNTER — TELEPHONE (OUTPATIENT)
Dept: INTERNAL MEDICINE | Facility: CLINIC | Age: 54
End: 2024-04-15
Payer: COMMERCIAL

## 2024-04-15 RX ORDER — LOSARTAN POTASSIUM AND HYDROCHLOROTHIAZIDE 12.5; 5 MG/1; MG/1
1 TABLET ORAL DAILY
Qty: 90 TABLET | Refills: 0 | Status: SHIPPED | OUTPATIENT
Start: 2024-04-15

## 2024-04-15 NOTE — TELEPHONE ENCOUNTER
I'd suggest she reach out to her pharmacy, and ask if the next larger or smaller dose is available.  I'm happy to send that in if she'd like.

## 2024-04-15 NOTE — TELEPHONE ENCOUNTER
Spoke with patient.   Verified .     Patient aware to contact the pharmacy about available dosing.  Verbalized understanding.

## 2024-04-15 NOTE — TELEPHONE ENCOUNTER
Spoke with patient.   Verified .     Patient can not find Mounjaro 5mg/5ml in stock anywhere local.   Patient is wanting to know what she can take. She reports that she feels bad not having it. She is having abdominal pain, back pain, and just an overall sick feeling.  Provider please advise.

## 2024-04-16 ENCOUNTER — TELEPHONE (OUTPATIENT)
Dept: INTERNAL MEDICINE | Facility: CLINIC | Age: 54
End: 2024-04-16
Payer: COMMERCIAL

## 2024-04-16 DIAGNOSIS — E11.65 TYPE 2 DIABETES MELLITUS WITH HYPERGLYCEMIA, WITHOUT LONG-TERM CURRENT USE OF INSULIN: ICD-10-CM

## 2024-04-16 NOTE — TELEPHONE ENCOUNTER
Caller: Holly Apple    Relationship: Self    Best call back number: [phone]     Requested Prescriptions:   Requested Prescriptions     Pending Prescriptions Disp Refills    Tirzepatide (Mounjaro) 5 MG/0.5ML solution pen-injector pen 2 mL 2        Pharmacy where request should be sent: [facility] [address] [place] [address] [address] [facility] [phone] [facility] [phone]      Last office visit with prescribing clinician: 12/1/2023   Last telemedicine visit with prescribing clinician: Visit date not found   Next office visit with prescribing clinician: 5/23/2024     Additional details provided by patient: PATIENT STATES PHARMACY ABOVE HAS THE PRESCRIPTION AVAILABLE IN THE 2.5 MG ONLY. PATIENT IS REQUESTING PRESCRIPTION WITH DOSAGE UPDATE TO BE SENT THERE.    MAY LEAVE DETAILED MESSAGE IF NO ANSWER WITH UPDATE.     Jewell Ann Rep   04/16/24 15:39 EDT

## 2024-04-17 NOTE — TELEPHONE ENCOUNTER
Name: Holly Apple    Relationship: Self    Best Callback Number:     [phone]     HUB PROVIDED THE RELAY MESSAGE FROM THE OFFICE   PATIENT VOICED UNDERSTANDING AND HAS NO FURTHER QUESTIONS AT THIS TIME    ADDITIONAL INFORMATION: PATIENT STATES THAT SHE WILL TRY THE TRULICITY.

## 2024-04-17 NOTE — TELEPHONE ENCOUNTER
Left message for patient to return call to clinic.    HUB TO READ:  Due to Mounjaro shortage, we can try to switch to Trulicity for the time being. This is also a once a week shot that is in also indicated for Type 2 Diabetes. If this is okay, please let us know and we will send to pharmacy for you.

## 2024-04-18 RX ORDER — DULAGLUTIDE 1.5 MG/.5ML
1.5 INJECTION, SOLUTION SUBCUTANEOUS WEEKLY
Qty: 2 ML | Refills: 0 | Status: SHIPPED | OUTPATIENT
Start: 2024-04-18

## 2024-04-30 ENCOUNTER — TELEPHONE (OUTPATIENT)
Dept: INTERNAL MEDICINE | Facility: CLINIC | Age: 54
End: 2024-04-30
Payer: COMMERCIAL

## 2024-04-30 DIAGNOSIS — I10 ESSENTIAL HYPERTENSION: ICD-10-CM

## 2024-04-30 NOTE — TELEPHONE ENCOUNTER
Caller: Holly Apple    Relationship: Self    Best call back number: [phone]     What was the call regarding: PATIENT STATES THAT SHE NEEDS HER losartan-hydrochlorothiazide (HYZAAR) 50-12.5 MG per tablet MOVED TO [address] - [phone] [facility] [phone] FX  .      SHE STATES THAT SHE WILL NO LONGER BE USING [facility] FOR ANY OF HER PRESCRIPTIONS.

## 2024-05-01 RX ORDER — LOSARTAN POTASSIUM AND HYDROCHLOROTHIAZIDE 12.5; 5 MG/1; MG/1
1 TABLET ORAL DAILY
Qty: 90 TABLET | Refills: 0 | Status: SHIPPED | OUTPATIENT
Start: 2024-05-01

## 2024-05-01 NOTE — TELEPHONE ENCOUNTER
Name: Holly Apple    Relationship: Self    Best Callback Number: [phone]     HUB PROVIDED THE RELAY MESSAGE FROM THE OFFICE   PATIENT VOICED UNDERSTANDING AND HAS NO FURTHER QUESTIONS AT THIS TIME

## 2024-05-01 NOTE — TELEPHONE ENCOUNTER
Attempted to contact patient. Left message to call the office back.     HUB OK TO READ/ADVISE:  Medication has been sent over to [facility]!

## 2024-06-04 NOTE — PROGRESS NOTES
Chief Complaint  Hyperlipidemia, Hypertension, Diabetes, Med Refill, Foot Problem (Poss fungus), and Neck Pain (Neck/throat pain)    Debbie Apple presents to [facility]  History of Present Illness    Was having trouble getting the mounjaro   Has had 2 doses of the mounjaro   Starting weight 218 lbs.       Hyperlipidemia  This is a chronic problem. Exacerbating diseases include diabetes and obesity. She has no history of chronic renal disease, hypothyroidism, liver disease or nephrotic syndrome. Pertinent negatives include no chest pain, focal sensory loss, focal weakness, leg pain, myalgias or shortness of breath.   Hypertension  This is a chronic problem. Associated symptoms include anxiety. Pertinent negatives include no blurred vision, chest pain, headaches, malaise/fatigue, neck pain, orthopnea, palpitations, peripheral edema, PND, shortness of breath or sweats. Compliance problems include diet and exercise.  There is no history of angina, kidney disease, CAD/MI, CVA, heart failure, left ventricular hypertrophy or PVD. There is no history of chronic renal disease.   Diabetes  She presents for her initial diabetic visit. She has type 2 diabetes mellitus. Hypoglycemia symptoms include nervousness/anxiousness. Pertinent negatives for hypoglycemia include no confusion, dizziness, headaches, hunger, mood changes, pallor, seizures, sleepiness, speech difficulty, sweats or tremors. There are no diabetic associated symptoms. Pertinent negatives for diabetes include no blurred vision, no chest pain, no fatigue, no foot paresthesias, no foot ulcerations, no polydipsia, no polyphagia, no polyuria, no visual change, no weakness and no weight loss. There are no hypoglycemic complications. Symptoms are stable. There are no diabetic complications. Pertinent negatives for diabetic complications include no CVA, impotence or PVD. Risk factors for coronary  artery disease include dyslipidemia, family history, obesity, hypertension, stress and post-menopausal.   Anxiety  Presents for follow-up visit. Symptoms include decreased concentration, depressed mood, irritability and nervous/anxious behavior. Patient reports no chest pain, compulsions, confusion, dizziness, dry mouth, excessive worry, feeling of choking, hyperventilation, impotence, insomnia, malaise, muscle tension, nausea, obsessions, palpitations, panic, restlessness, shortness of breath or suicidal ideas.       Obesity  This is a chronic problem. Pertinent negatives include no abdominal pain, anorexia, arthralgias, change in bowel habit, chest pain, chills, congestion, coughing, diaphoresis, fatigue, fever, headaches, joint swelling, myalgias, nausea, neck pain, numbness, rash, sore throat, swollen glands, urinary symptoms, vertigo, visual change, vomiting or weakness.     Going to dermatologist every 3 months for her skin.   Rosacea.           Current Outpatient Medications   Medication Instructions    Blood Glucose Monitoring Suppl (FreeStyle Lite) w/Device kit     ciclopirox (PENLAC) 8 % solution Topical, Nightly    CRANBERRY PO Oral    EPINEPHrine (EPIPEN) 0.3 mg, Intramuscular, Once As Needed    Estrogens Conjugated (Premarin) 0.625 MG/GM vaginal cream Vaginal, Daily    fexofenadine (ALLEGRA) 180 mg, Oral, Nightly    glucose blood test strip Daily in the AM, fasting    glucose monitor monitoring kit 1 each, Does not apply, Daily    Lancets (accu-chek multiclix) lancets Use one daily in the AM, fasting    losartan-hydrochlorothiazide (HYZAAR) 50-12.5 MG per tablet 1 tablet, Oral, Daily    metFORMIN (GLUCOPHAGE) 500 mg, Oral, 2 Times Daily With Meals    multivitamin (MULTIVITAMIN PO) Oral, Daily    rosuvastatin (CRESTOR) 5 mg, Oral, Daily       The following portions of the patient's history were reviewed and updated as appropriate: allergies, current medications, past family history, past medical history,  "past social history, past surgical history, and problem list.    Objective   Vital Signs:   /84   Pulse 78   Temp 96.8 °F (36 °C)   Ht 162.6 cm (64\")   Wt 82.6 kg (182 lb)   SpO2 95%   BMI 31.24 kg/m²     BP Readings from Last 3 Encounters:   06/05/24 128/84   02/02/24 119/61   12/11/23 103/69     Wt Readings from Last 3 Encounters:   06/05/24 82.6 kg (182 lb)   02/02/24 84.6 kg (186 lb 9.6 oz)   12/11/23 90.7 kg (200 lb)           Physical Exam  Constitutional:       Appearance: She is obese.          Appearance: No acute distress, well-nourished  Head: normocephalic, atraumatic  Eyes: extraocular movements intact, no scleral icterus, no conjunctival injection  Ears, Nose, and Throat: external ears normal, nares patent, moist mucous membranes  Cardiovascular: regular rate and rhythm. no murmurs, rubs, or gallops. no edema  Respiratory: breathing comfortably, symmetric chest rise, clear to auscultation bilaterally. No wheezes, rales, or rhonchi.  Neuro: alert and oriented to time, place, and person. Normal gait  Psych: normal mood and affect     Result Review :   The following data was reviewed by: KASSANDRA Wilder on 06/05/2024:  Common labs          12/1/2023    17:00 1/26/2024    16:34 1/26/2024    16:42 6/5/2024    12:57   Common Labs   Glucose 91  155   82    BUN 15  17   15    Creatinine 1.10  1.07   0.98    Sodium 137  138   138    Potassium 4.1  3.7   4.1    Chloride 98  96   96    Calcium 9.8  10.1   10.0    Albumin 4.6    4.5    Total Bilirubin 0.6    0.4    Alkaline Phosphatase 68    70    AST (SGOT) 25    20    ALT (SGPT) 36    22    WBC 5.31    6.57    Hemoglobin 14.1    13.9    Hematocrit 44.8    44.1    Platelets 305    289    Total Cholesterol 170    149    Triglycerides 105    135    HDL Cholesterol 54    57    LDL Cholesterol  97    69    Hemoglobin A1C 6.30    5.80    Microalbumin, Urine   <1.2              Lab Results   Component Value Date    SARSANTIGEN Not Detected " 09/27/2023    COVID19 Not Detected 09/27/2023    RAPFLUA Negative 05/09/2022    RAPFLUB Negative 05/09/2022    FLUAAG Not Detected 09/27/2023    FLUBAG Not Detected 09/27/2023    RAPSCRN Negative 04/12/2023    INR 1.0 02/27/2023    BILIRUBINUR Negative 06/26/2023       Procedures        Assessment and Plan    Diagnoses and all orders for this visit:    1. Essential hypertension (Primary)  Assessment & Plan:  Hypertension is stable and controlled  Continue current treatment regimen.  Dietary sodium restriction.  Weight loss.  Regular aerobic exercise.  Ambulatory blood pressure monitoring.  Blood pressure will be reassessed in 3 months.    Orders:  -     CBC w AUTO Differential  -     Comprehensive metabolic panel    2. Mixed hyperlipidemia  Assessment & Plan:   Lipid abnormalities are stable    Plan:  Continue same medication/s without change.      Discussed medication dosage, use, side effects, and goals of treatment in detail.    Counseled patient on lifestyle modifications to help control hyperlipidemia.   Advised patient to exercise for 150 minutes weekly. (30 minute brisk walk, 5 days a week for example)  Weight Loss encouraged    Patient Treatment Goals:   LDL goal is less than 70    Followup in 3 months.    Orders:  -     Lipid panel  -     rosuvastatin (Crestor) 5 MG tablet; Take 1 tablet by mouth Daily.  Dispense: 90 tablet; Refill: 0    3. Type 2 diabetes mellitus with hyperglycemia, without long-term current use of insulin  Assessment & Plan:  Diabetes is  will rehceck A1C today  .   Continue current treatment regimen.  Recommended an ADA diet.  Recommended a Mediterranean style of eating  Regular aerobic exercise.  Discussed foot care.  Diabetes will be reassessed in 3 months    Orders:  -     Hemoglobin A1c    4. Need for shingles vaccine  -     Shingrix Vaccine    5. Nail problem  -     ciclopirox (PENLAC) 8 % solution; Apply  topically to the appropriate area as directed Every Night.  Dispense: 6 mL;  Refill: 0  -     Ambulatory Referral to Podiatry    6. Encounter for immunization  -     Tdap Vaccine Greater Than or Equal To 6yo IM    7. Hot flashes  -     Estrogens Conjugated (Premarin) 0.625 MG/GM vaginal cream; Insert  into the vagina Daily.  Dispense: 30 g; Refill: 2    8. Vaginal dryness, menopausal  -     Estrogens Conjugated (Premarin) 0.625 MG/GM vaginal cream; Insert  into the vagina Daily.  Dispense: 30 g; Refill: 2    9. Class 1 obesity with serious comorbidity and body mass index (BMI) of 31.0 to 31.9 in adult, unspecified obesity type  Assessment & Plan:  Patient's (Body mass index is 31.24 kg/m².) indicates that they are obese (BMI >30) with health conditions that include hypertension, diabetes mellitus, and dyslipidemias . Weight is improving with treatment. BMI  is above average; BMI management plan is completed. We discussed low calorie, low carb based diet program, portion control, and increasing exercise.       Other orders  -     EPINEPHrine (EPIPEN) 0.3 MG/0.3ML solution auto-injector injection; Inject 0.3 mL into the appropriate muscle as directed by prescriber 1 (One) Time As Needed (anaphylactic shock) for up to 1 dose.  Dispense: 1 each; Refill: 2      - increased cancer risk discussed with patient with vaginal estrogen. Pt verbalizes understanding and is agreeable to take medication.     Medications Discontinued During This Encounter   Medication Reason    EPINEPHrine (EPIPEN) 0.3 MG/0.3ML solution auto-injector injection Reorder    rosuvastatin (Crestor) 5 MG tablet Reorder    Tetrahydroz-Polyvinyl Al-Povid (CLEAR EYES TRIPLE ACTION OP) Historical Med - Therapy completed    Omega-3 Fatty Acids (FISH OIL PO) Historical Med - Therapy completed    Polyethylene Glycol 400 (BLINK TEARS OP) Historical Med - Therapy completed    saccharomyces boulardii (Florastor) 250 MG capsule Historical Med - Therapy completed    fluticasone (FLONASE) 50 MCG/ACT nasal spray     Dulaglutide (Trulicity) 1.5  MG/0.5ML solution pen-injector Historical Med - Therapy completed    TobraDex 0.3-0.1 % ophthalmic suspension Historical Med - Therapy completed    triamcinolone (KENALOG) 0.1 % ointment Historical Med - Therapy completed    cholecalciferol (VITAMIN D3) 25 MCG (1000 UT) tablet Historical Med - Therapy completed    alclometasone (ACLOVATE) 0.05 % ointment Historical Med - Therapy completed    fluorometholone (FML) 0.1 % ophthalmic suspension Historical Med - Therapy completed    DULoxetine (Cymbalta) 20 MG capsule Historical Med - Therapy completed          Follow Up   Return in about 3 months (around 9/5/2024) for Diabetes, Hyperlipidemia, Weight.  Patient was given instructions and counseling regarding her condition or for health maintenance advice. Please see specific information pulled into the AVS if appropriate.       Isa Wolf, KASSANDRA  06/12/24  13:30 EDT

## 2024-06-05 ENCOUNTER — OFFICE VISIT (OUTPATIENT)
Dept: INTERNAL MEDICINE | Facility: CLINIC | Age: 54
End: 2024-06-05
Payer: COMMERCIAL

## 2024-06-05 VITALS
OXYGEN SATURATION: 95 % | SYSTOLIC BLOOD PRESSURE: 128 MMHG | TEMPERATURE: 96.8 F | DIASTOLIC BLOOD PRESSURE: 84 MMHG | WEIGHT: 182 LBS | HEIGHT: 64 IN | HEART RATE: 78 BPM | BODY MASS INDEX: 31.07 KG/M2

## 2024-06-05 DIAGNOSIS — Z23 ENCOUNTER FOR IMMUNIZATION: ICD-10-CM

## 2024-06-05 DIAGNOSIS — Z23 NEED FOR SHINGLES VACCINE: ICD-10-CM

## 2024-06-05 DIAGNOSIS — E78.2 MIXED HYPERLIPIDEMIA: ICD-10-CM

## 2024-06-05 DIAGNOSIS — E11.65 TYPE 2 DIABETES MELLITUS WITH HYPERGLYCEMIA, WITHOUT LONG-TERM CURRENT USE OF INSULIN: ICD-10-CM

## 2024-06-05 DIAGNOSIS — R23.2 HOT FLASHES: ICD-10-CM

## 2024-06-05 DIAGNOSIS — L60.9 NAIL PROBLEM: ICD-10-CM

## 2024-06-05 DIAGNOSIS — I10 ESSENTIAL HYPERTENSION: Primary | ICD-10-CM

## 2024-06-05 DIAGNOSIS — N95.1 VAGINAL DRYNESS, MENOPAUSAL: ICD-10-CM

## 2024-06-05 DIAGNOSIS — E66.9 CLASS 1 OBESITY WITH SERIOUS COMORBIDITY AND BODY MASS INDEX (BMI) OF 31.0 TO 31.9 IN ADULT, UNSPECIFIED OBESITY TYPE: ICD-10-CM

## 2024-06-05 LAB
ALBUMIN SERPL-MCNC: 4.5 G/DL (ref 3.5–5.2)
ALBUMIN/GLOB SERPL: 1.6 G/DL
ALP SERPL-CCNC: 70 U/L (ref 39–117)
ALT SERPL W P-5'-P-CCNC: 22 U/L (ref 1–33)
ANION GAP SERPL CALCULATED.3IONS-SCNC: 13.8 MMOL/L (ref 5–15)
AST SERPL-CCNC: 20 U/L (ref 1–32)
BASOPHILS # BLD AUTO: 0.05 10*3/MM3 (ref 0–0.2)
BASOPHILS NFR BLD AUTO: 0.8 % (ref 0–1.5)
BILIRUB SERPL-MCNC: 0.4 MG/DL (ref 0–1.2)
BUN SERPL-MCNC: 15 MG/DL (ref 6–20)
BUN/CREAT SERPL: 15.3 (ref 7–25)
CALCIUM SPEC-SCNC: 10 MG/DL (ref 8.6–10.5)
CHLORIDE SERPL-SCNC: 96 MMOL/L (ref 98–107)
CHOLEST SERPL-MCNC: 149 MG/DL (ref 0–200)
CO2 SERPL-SCNC: 28.2 MMOL/L (ref 22–29)
CREAT SERPL-MCNC: 0.98 MG/DL (ref 0.57–1)
DEPRECATED RDW RBC AUTO: 39.9 FL (ref 37–54)
EGFRCR SERPLBLD CKD-EPI 2021: 68.3 ML/MIN/1.73
EOSINOPHIL # BLD AUTO: 0.18 10*3/MM3 (ref 0–0.4)
EOSINOPHIL NFR BLD AUTO: 2.7 % (ref 0.3–6.2)
ERYTHROCYTE [DISTWIDTH] IN BLOOD BY AUTOMATED COUNT: 13.6 % (ref 12.3–15.4)
GLOBULIN UR ELPH-MCNC: 2.8 GM/DL
GLUCOSE SERPL-MCNC: 82 MG/DL (ref 65–99)
HBA1C MFR BLD: 5.8 % (ref 4.8–5.6)
HCT VFR BLD AUTO: 44.1 % (ref 34–46.6)
HDLC SERPL-MCNC: 57 MG/DL (ref 40–60)
HGB BLD-MCNC: 13.9 G/DL (ref 12–15.9)
IMM GRANULOCYTES # BLD AUTO: 0.02 10*3/MM3 (ref 0–0.05)
IMM GRANULOCYTES NFR BLD AUTO: 0.3 % (ref 0–0.5)
LDLC SERPL CALC-MCNC: 69 MG/DL (ref 0–100)
LDLC/HDLC SERPL: 1.14 {RATIO}
LYMPHOCYTES # BLD AUTO: 2.87 10*3/MM3 (ref 0.7–3.1)
LYMPHOCYTES NFR BLD AUTO: 43.7 % (ref 19.6–45.3)
MCH RBC QN AUTO: 25.4 PG (ref 26.6–33)
MCHC RBC AUTO-ENTMCNC: 31.5 G/DL (ref 31.5–35.7)
MCV RBC AUTO: 80.5 FL (ref 79–97)
MONOCYTES # BLD AUTO: 0.39 10*3/MM3 (ref 0.1–0.9)
MONOCYTES NFR BLD AUTO: 5.9 % (ref 5–12)
NEUTROPHILS NFR BLD AUTO: 3.06 10*3/MM3 (ref 1.7–7)
NEUTROPHILS NFR BLD AUTO: 46.6 % (ref 42.7–76)
NRBC BLD AUTO-RTO: 0 /100 WBC (ref 0–0.2)
PLATELET # BLD AUTO: 289 10*3/MM3 (ref 140–450)
PMV BLD AUTO: 8.7 FL (ref 6–12)
POTASSIUM SERPL-SCNC: 4.1 MMOL/L (ref 3.5–5.2)
PROT SERPL-MCNC: 7.3 G/DL (ref 6–8.5)
RBC # BLD AUTO: 5.48 10*6/MM3 (ref 3.77–5.28)
SODIUM SERPL-SCNC: 138 MMOL/L (ref 136–145)
TRIGL SERPL-MCNC: 135 MG/DL (ref 0–150)
VLDLC SERPL-MCNC: 23 MG/DL (ref 5–40)
WBC NRBC COR # BLD AUTO: 6.57 10*3/MM3 (ref 3.4–10.8)

## 2024-06-05 PROCEDURE — 3044F HG A1C LEVEL LT 7.0%: CPT | Performed by: NURSE PRACTITIONER

## 2024-06-05 PROCEDURE — 90471 IMMUNIZATION ADMIN: CPT | Performed by: NURSE PRACTITIONER

## 2024-06-05 PROCEDURE — 3079F DIAST BP 80-89 MM HG: CPT | Performed by: NURSE PRACTITIONER

## 2024-06-05 PROCEDURE — 80053 COMPREHEN METABOLIC PANEL: CPT | Performed by: NURSE PRACTITIONER

## 2024-06-05 PROCEDURE — 90472 IMMUNIZATION ADMIN EACH ADD: CPT | Performed by: NURSE PRACTITIONER

## 2024-06-05 PROCEDURE — 85025 COMPLETE CBC W/AUTO DIFF WBC: CPT | Performed by: NURSE PRACTITIONER

## 2024-06-05 PROCEDURE — 80061 LIPID PANEL: CPT | Performed by: NURSE PRACTITIONER

## 2024-06-05 PROCEDURE — 90715 TDAP VACCINE 7 YRS/> IM: CPT | Performed by: NURSE PRACTITIONER

## 2024-06-05 PROCEDURE — 83036 HEMOGLOBIN GLYCOSYLATED A1C: CPT | Performed by: NURSE PRACTITIONER

## 2024-06-05 PROCEDURE — 90750 HZV VACC RECOMBINANT IM: CPT | Performed by: NURSE PRACTITIONER

## 2024-06-05 PROCEDURE — 99214 OFFICE O/P EST MOD 30 MIN: CPT | Performed by: NURSE PRACTITIONER

## 2024-06-05 PROCEDURE — 3074F SYST BP LT 130 MM HG: CPT | Performed by: NURSE PRACTITIONER

## 2024-06-05 RX ORDER — CICLOPIROX 80 MG/ML
SOLUTION TOPICAL NIGHTLY
Qty: 6 ML | Refills: 0 | Status: SHIPPED | OUTPATIENT
Start: 2024-06-05

## 2024-06-05 RX ORDER — CONJUGATED ESTROGENS 0.62 MG/G
CREAM VAGINAL DAILY
Qty: 30 G | Refills: 2 | Status: SHIPPED | OUTPATIENT
Start: 2024-06-05

## 2024-06-05 RX ORDER — ROSUVASTATIN CALCIUM 5 MG/1
5 TABLET, COATED ORAL DAILY
Qty: 90 TABLET | Refills: 0 | Status: SHIPPED | OUTPATIENT
Start: 2024-06-05

## 2024-06-05 RX ORDER — EPINEPHRINE 0.3 MG/.3ML
0.3 INJECTION SUBCUTANEOUS ONCE AS NEEDED
Qty: 1 EACH | Refills: 2 | Status: SHIPPED | OUTPATIENT
Start: 2024-06-05

## 2024-06-05 NOTE — LETTER
[facility]  Vaccine Consent Form    Patient Name:  Holly Apple  Patient :  1970     Vaccine(s) Ordered    Shingrix Vaccine  Tdap Vaccine Greater Than or Equal To 6yo IM        Screening Checklist  The following questions should be completed prior to vaccination. If you answer “yes” to any question, it does not necessarily mean you should not be vaccinated. It just means we may need to clarify or ask more questions. If a question is unclear, please ask your healthcare provider to explain it.    Yes No   Any fever or moderate to severe illness today (mild illness and/or antibiotic treatment are not contraindications)?     Do you have a history of a serious reaction to any previous vaccinations, such as anaphylaxis, encephalopathy within 7 days, Guillain-Bedias syndrome within 6 weeks, seizure?     Have you received any live vaccine(s) (e.g MMR, ADRIAN) or any other vaccines in the last month (to ensure duplicate doses aren't given)?     Do you have an anaphylactic allergy to latex (DTaP, DTaP-IPV, Hep A, Hep B, MenB, RV, Td, Tdap), baker’s yeast (Hep B, HPV), polysorbates (RSV, nirsevimab, PCV 20, Rotavirrus, Tdap, Shingrix), or gelatin (ADRIAN, MMR)?     Do you have an anaphylactic allergy to neomycin (Rabies, ADRIAN, MMR, IPV, Hep A), polymyxin B (IPV), or streptomycin (IPV)?      Any cancer, leukemia, AIDS, or other immune system disorder? (ADRIAN, MMR, RV)     Do you have a parent, brother, or sister with an immune system problem (if immune competence of vaccine recipient clinically verified, can proceed)? (MMR, ADRIAN)     Any recent steroid treatments for >2 weeks, chemotherapy, or radiation treatment? (ADRIAN, MMR)     Have you received antibody-containing blood transfusions or IVIG in the past 11 months (recommended interval is dependent on product)? (MMR, ADRIAN)     Have you taken antiviral drugs (acyclovir, famciclovir, valacyclovir for ADRIAN) in the last 24 or 48 hours, respectively?      Are you pregnant or  "planning to become pregnant within 1 month? (ADRIAN, MMR, HPV, IPV, MenB, Abrexvy; For Hep B- refer to Engerix-B; For RSV - Abrysvo is indicated for 32-36 weeks of pregnancy from September to January)     For infants, have you ever been told your child has had intussusception or a medical emergency involving obstruction of the intestine (Rotavirus)? If not for an infant, can skip this question.         *Ordering Physicians/APC should be consulted if \"yes\" is checked by the patient or guardian above.  I have received, read, and understand the Vaccine Information Statement (VIS) for each vaccine ordered.  I have considered my or my child's health status as well as the health status of my close contacts.  I have taken the opportunity to discuss my vaccine questions with my or my child's health care provider.   I have requested that the ordered vaccine(s) be given to me or my child.  I understand the benefits and risks of the vaccines.  I understand that I should remain in the clinic for 15 minutes after receiving the vaccine(s).  _________________________________________________________  Signature of Patient or Parent/Legal Guardian ____________________  Date     "

## 2024-06-11 ENCOUNTER — TELEPHONE (OUTPATIENT)
Dept: INTERNAL MEDICINE | Facility: CLINIC | Age: 54
End: 2024-06-11
Payer: COMMERCIAL

## 2024-06-11 NOTE — TELEPHONE ENCOUNTER
Caller: Holly Apple    Relationship: Self    Best call back number: [phone]     What is the best time to reach you: ANY    Who are you requesting to speak with (clinical staff, provider,  specific staff member): CLINICAL STAFF    What was the call regarding: PATIENT CALLED WANTING TO KNOW HER RECENT A1C RESULTS

## 2024-06-11 NOTE — TELEPHONE ENCOUNTER
Left message for patient to return call to clinic.    HUB TO READ:  Most recent A1c was 5.8 which is down from previous A1c 6 months ago that was 6.3! Awesome work!

## 2024-06-12 NOTE — ASSESSMENT & PLAN NOTE
Diabetes is  will rehceck A1C today  .   Continue current treatment regimen.  Recommended an ADA diet.  Recommended a Mediterranean style of eating  Regular aerobic exercise.  Discussed foot care.  Diabetes will be reassessed in 3 months

## 2024-06-12 NOTE — ASSESSMENT & PLAN NOTE
Lipid abnormalities are stable    Plan:  Continue same medication/s without change.      Discussed medication dosage, use, side effects, and goals of treatment in detail.    Counseled patient on lifestyle modifications to help control hyperlipidemia.   Advised patient to exercise for 150 minutes weekly. (30 minute brisk walk, 5 days a week for example)  Weight Loss encouraged    Patient Treatment Goals:   LDL goal is less than 70    Followup in 3 months.

## 2024-06-12 NOTE — TELEPHONE ENCOUNTER
Name: Holly Apple    Relationship: Self    Best Callback Number: [phone]    HUB PROVIDED THE RELAY MESSAGE FROM THE OFFICE   PATIENT VOICED UNDERSTANDING AND HAS NO FURTHER QUESTIONS AT THIS TIME    ADDITIONAL INFORMATION:

## 2024-06-12 NOTE — ASSESSMENT & PLAN NOTE
Patient's (Body mass index is 31.24 kg/m².) indicates that they are obese (BMI >30) with health conditions that include hypertension, diabetes mellitus, and dyslipidemias . Weight is improving with treatment. BMI  is above average; BMI management plan is completed. We discussed low calorie, low carb based diet program, portion control, and increasing exercise.

## 2024-07-15 ENCOUNTER — TELEPHONE (OUTPATIENT)
Dept: INTERNAL MEDICINE | Facility: CLINIC | Age: 54
End: 2024-07-15
Payer: COMMERCIAL

## 2024-08-05 DIAGNOSIS — I10 ESSENTIAL HYPERTENSION: ICD-10-CM

## 2024-08-05 NOTE — TELEPHONE ENCOUNTER
Caller: Holly Apple    Relationship: Self    Best call back number: [phone]     Requested Prescriptions:   Requested Prescriptions     Pending Prescriptions Disp Refills    losartan-hydrochlorothiazide (HYZAAR) 50-12.5 MG per tablet 90 tablet 0     Sig: Take 1 tablet by mouth Daily.        Pharmacy where request should be sent: [address] [phone] [facility] [phone]      Last office visit with prescribing clinician: 6/5/2024   Last telemedicine visit with prescribing clinician: Visit date not found   Next office visit with prescribing clinician: 9/5/2024       Does the patient have less than a 3 day supply:  [x] Yes  [] No    Would you like a call back once the refill request has been completed: [] Yes [] No    If the office needs to give you a call back, can they leave a voicemail: [] Yes [] No    Jewell Chavez   08/05/24 16:12 EDT

## 2024-08-06 DIAGNOSIS — I10 ESSENTIAL HYPERTENSION: ICD-10-CM

## 2024-08-06 RX ORDER — LOSARTAN POTASSIUM AND HYDROCHLOROTHIAZIDE 12.5; 5 MG/1; MG/1
1 TABLET ORAL DAILY
Qty: 90 TABLET | Refills: 0 | OUTPATIENT
Start: 2024-08-06

## 2024-08-06 RX ORDER — LOSARTAN POTASSIUM AND HYDROCHLOROTHIAZIDE 12.5; 5 MG/1; MG/1
1 TABLET ORAL DAILY
Qty: 90 TABLET | Refills: 0 | Status: SHIPPED | OUTPATIENT
Start: 2024-08-06

## 2024-08-06 NOTE — TELEPHONE ENCOUNTER
Caller: Holly Apple    Relationship: Self    Best call back number: [phone]    Requested Prescriptions:   Requested Prescriptions     Pending Prescriptions Disp Refills    losartan-hydrochlorothiazide (HYZAAR) 50-12.5 MG per tablet 90 tablet 0     Sig: Take 1 tablet by mouth Daily.        Pharmacy where request should be sent: [address] [phone] [facility] [phone]      Last office visit with prescribing clinician: 6/5/2024   Last telemedicine visit with prescribing clinician: Visit date not found   Next office visit with prescribing clinician: 8/6/2024     Additional details provided by patient: PATIENT IS CURRENTLY OUT OF THIS.     Does the patient have less than a 3 day supply:  [x] Yes  [] No    Would you like a call back once the refill request has been completed: [] Yes [x] No    If the office needs to give you a call back, can they leave a voicemail: [] Yes [x] No    Jewell Douglas   08/06/24 15:36 EDT

## 2024-09-05 ENCOUNTER — OFFICE VISIT (OUTPATIENT)
Dept: INTERNAL MEDICINE | Facility: CLINIC | Age: 54
End: 2024-09-05
Payer: COMMERCIAL

## 2024-09-05 VITALS
TEMPERATURE: 96.7 F | OXYGEN SATURATION: 96 % | SYSTOLIC BLOOD PRESSURE: 116 MMHG | HEIGHT: 64 IN | HEART RATE: 79 BPM | BODY MASS INDEX: 31.24 KG/M2 | DIASTOLIC BLOOD PRESSURE: 79 MMHG | WEIGHT: 183 LBS

## 2024-09-05 DIAGNOSIS — Z23 NEED FOR VACCINATION: ICD-10-CM

## 2024-09-05 DIAGNOSIS — Z12.31 ENCOUNTER FOR SCREENING MAMMOGRAM FOR MALIGNANT NEOPLASM OF BREAST: ICD-10-CM

## 2024-09-05 DIAGNOSIS — E11.65 TYPE 2 DIABETES MELLITUS WITH HYPERGLYCEMIA, WITHOUT LONG-TERM CURRENT USE OF INSULIN: Chronic | ICD-10-CM

## 2024-09-05 DIAGNOSIS — I10 ESSENTIAL HYPERTENSION: Chronic | ICD-10-CM

## 2024-09-05 DIAGNOSIS — Z12.11 SCREENING FOR COLON CANCER: ICD-10-CM

## 2024-09-05 DIAGNOSIS — E78.2 MIXED HYPERLIPIDEMIA: Primary | ICD-10-CM

## 2024-09-05 DIAGNOSIS — T78.1XXA GASTROINTESTINAL FOOD SENSITIVITY: ICD-10-CM

## 2024-09-05 DIAGNOSIS — E66.9 CLASS 1 OBESITY WITH SERIOUS COMORBIDITY AND BODY MASS INDEX (BMI) OF 31.0 TO 31.9 IN ADULT, UNSPECIFIED OBESITY TYPE: ICD-10-CM

## 2024-09-05 LAB
ALBUMIN SERPL-MCNC: 4.6 G/DL (ref 3.5–5.2)
ALBUMIN/GLOB SERPL: 1.5 G/DL
ALP SERPL-CCNC: 71 U/L (ref 39–117)
ALT SERPL W P-5'-P-CCNC: 25 U/L (ref 1–33)
ANION GAP SERPL CALCULATED.3IONS-SCNC: 9.1 MMOL/L (ref 5–15)
AST SERPL-CCNC: 21 U/L (ref 1–32)
BASOPHILS # BLD AUTO: 0.04 10*3/MM3 (ref 0–0.2)
BASOPHILS NFR BLD AUTO: 0.6 % (ref 0–1.5)
BILIRUB SERPL-MCNC: 0.4 MG/DL (ref 0–1.2)
BUN SERPL-MCNC: 17 MG/DL (ref 6–20)
BUN/CREAT SERPL: 17.7 (ref 7–25)
CALCIUM SPEC-SCNC: 10 MG/DL (ref 8.6–10.5)
CHLORIDE SERPL-SCNC: 100 MMOL/L (ref 98–107)
CHOLEST SERPL-MCNC: 165 MG/DL (ref 0–200)
CO2 SERPL-SCNC: 29.9 MMOL/L (ref 22–29)
CREAT SERPL-MCNC: 0.96 MG/DL (ref 0.57–1)
DEPRECATED RDW RBC AUTO: 39.8 FL (ref 37–54)
EGFRCR SERPLBLD CKD-EPI 2021: 70 ML/MIN/1.73
EOSINOPHIL # BLD AUTO: 0.16 10*3/MM3 (ref 0–0.4)
EOSINOPHIL NFR BLD AUTO: 2.3 % (ref 0.3–6.2)
ERYTHROCYTE [DISTWIDTH] IN BLOOD BY AUTOMATED COUNT: 13.6 % (ref 12.3–15.4)
GLOBULIN UR ELPH-MCNC: 3.1 GM/DL
GLUCOSE SERPL-MCNC: 89 MG/DL (ref 65–99)
HBA1C MFR BLD: 6 % (ref 4.8–5.6)
HCT VFR BLD AUTO: 42.5 % (ref 34–46.6)
HDLC SERPL-MCNC: 53 MG/DL (ref 40–60)
HGB BLD-MCNC: 13.6 G/DL (ref 12–15.9)
IMM GRANULOCYTES # BLD AUTO: 0.02 10*3/MM3 (ref 0–0.05)
IMM GRANULOCYTES NFR BLD AUTO: 0.3 % (ref 0–0.5)
LDLC SERPL CALC-MCNC: 81 MG/DL (ref 0–100)
LDLC/HDLC SERPL: 1.43 {RATIO}
LYMPHOCYTES # BLD AUTO: 2.79 10*3/MM3 (ref 0.7–3.1)
LYMPHOCYTES NFR BLD AUTO: 40.6 % (ref 19.6–45.3)
MCH RBC QN AUTO: 25.6 PG (ref 26.6–33)
MCHC RBC AUTO-ENTMCNC: 32 G/DL (ref 31.5–35.7)
MCV RBC AUTO: 80 FL (ref 79–97)
MONOCYTES # BLD AUTO: 0.44 10*3/MM3 (ref 0.1–0.9)
MONOCYTES NFR BLD AUTO: 6.4 % (ref 5–12)
NEUTROPHILS NFR BLD AUTO: 3.42 10*3/MM3 (ref 1.7–7)
NEUTROPHILS NFR BLD AUTO: 49.8 % (ref 42.7–76)
NRBC BLD AUTO-RTO: 0 /100 WBC (ref 0–0.2)
PLATELET # BLD AUTO: 278 10*3/MM3 (ref 140–450)
PMV BLD AUTO: 8.5 FL (ref 6–12)
POTASSIUM SERPL-SCNC: 3.7 MMOL/L (ref 3.5–5.2)
PROT SERPL-MCNC: 7.7 G/DL (ref 6–8.5)
RBC # BLD AUTO: 5.31 10*6/MM3 (ref 3.77–5.28)
SODIUM SERPL-SCNC: 139 MMOL/L (ref 136–145)
TRIGL SERPL-MCNC: 182 MG/DL (ref 0–150)
VLDLC SERPL-MCNC: 31 MG/DL (ref 5–40)
WBC NRBC COR # BLD AUTO: 6.87 10*3/MM3 (ref 3.4–10.8)

## 2024-09-05 PROCEDURE — 83036 HEMOGLOBIN GLYCOSYLATED A1C: CPT | Performed by: NURSE PRACTITIONER

## 2024-09-05 PROCEDURE — 86003 ALLG SPEC IGE CRUDE XTRC EA: CPT | Performed by: NURSE PRACTITIONER

## 2024-09-05 PROCEDURE — 3078F DIAST BP <80 MM HG: CPT | Performed by: NURSE PRACTITIONER

## 2024-09-05 PROCEDURE — 3044F HG A1C LEVEL LT 7.0%: CPT | Performed by: NURSE PRACTITIONER

## 2024-09-05 PROCEDURE — 85025 COMPLETE CBC W/AUTO DIFF WBC: CPT | Performed by: NURSE PRACTITIONER

## 2024-09-05 PROCEDURE — 1159F MED LIST DOCD IN RCRD: CPT | Performed by: NURSE PRACTITIONER

## 2024-09-05 PROCEDURE — 3074F SYST BP LT 130 MM HG: CPT | Performed by: NURSE PRACTITIONER

## 2024-09-05 PROCEDURE — 80061 LIPID PANEL: CPT | Performed by: NURSE PRACTITIONER

## 2024-09-05 PROCEDURE — 1160F RVW MEDS BY RX/DR IN RCRD: CPT | Performed by: NURSE PRACTITIONER

## 2024-09-05 PROCEDURE — 80053 COMPREHEN METABOLIC PANEL: CPT | Performed by: NURSE PRACTITIONER

## 2024-09-05 PROCEDURE — 99214 OFFICE O/P EST MOD 30 MIN: CPT | Performed by: NURSE PRACTITIONER

## 2024-09-05 RX ORDER — TIRZEPATIDE 5 MG/.5ML
5 INJECTION, SOLUTION SUBCUTANEOUS WEEKLY
Qty: 2 ML | Refills: 3 | Status: SHIPPED | OUTPATIENT
Start: 2024-09-05

## 2024-09-05 RX ORDER — LOSARTAN POTASSIUM AND HYDROCHLOROTHIAZIDE 12.5; 5 MG/1; MG/1
1 TABLET ORAL DAILY
Qty: 90 TABLET | Refills: 1 | Status: SHIPPED | OUTPATIENT
Start: 2024-09-05

## 2024-09-05 RX ORDER — ROSUVASTATIN CALCIUM 5 MG/1
5 TABLET, COATED ORAL DAILY
Qty: 90 TABLET | Refills: 1 | Status: SHIPPED | OUTPATIENT
Start: 2024-09-05

## 2024-09-05 RX ORDER — TIRZEPATIDE 5 MG/.5ML
5 INJECTION, SOLUTION SUBCUTANEOUS WEEKLY
COMMUNITY
Start: 2024-08-03 | End: 2024-09-05 | Stop reason: SDUPTHER

## 2024-09-05 NOTE — PROGRESS NOTES
Chief Complaint  Diabetes (Mounjaro is coming due for refill, patient only has 1 week left. Patient is going to be getting A1c checked today. ), Hyperlipidemia, Obesity, and Hypertension    Subjective          Holly Apple presents to [facility]  History of Present Illness      Was having trouble getting the mounjaro   Has had 2 doses of the mounjaro   Starting weight 218 lbs.       Hyperlipidemia  This is a chronic problem. Exacerbating diseases include diabetes and obesity. She has no history of chronic renal disease, hypothyroidism, liver disease or nephrotic syndrome. Pertinent negatives include no chest pain, focal sensory loss, focal weakness, leg pain, myalgias or shortness of breath.   Hypertension  This is a chronic problem. Associated symptoms include anxiety. Pertinent negatives include no blurred vision, chest pain, headaches, malaise/fatigue, neck pain, orthopnea, palpitations, peripheral edema, PND, shortness of breath or sweats. Compliance problems include diet and exercise.  There is no history of angina, kidney disease, CAD/MI, CVA, heart failure, left ventricular hypertrophy or PVD. There is no history of chronic renal disease.   Diabetes  She presents for her initial diabetic visit. She has type 2 diabetes mellitus. Hypoglycemia symptoms include nervousness/anxiousness. Pertinent negatives for hypoglycemia include no confusion, dizziness, headaches, hunger, mood changes, pallor, seizures, sleepiness, speech difficulty, sweats or tremors. There are no diabetic associated symptoms. Pertinent negatives for diabetes include no blurred vision, no chest pain, no fatigue, no foot paresthesias, no foot ulcerations, no polydipsia, no polyphagia, no polyuria, no visual change, no weakness and no weight loss. There are no hypoglycemic complications. Symptoms are stable. There are no diabetic complications. Pertinent negatives for diabetic complications  include no CVA, impotence or PVD. Risk factors for coronary artery disease include dyslipidemia, family history, obesity, hypertension, stress and post-menopausal.   Anxiety  Presents for follow-up visit. Symptoms include decreased concentration, depressed mood, irritability and nervous/anxious behavior. Patient reports no chest pain, compulsions, confusion, dizziness, dry mouth, excessive worry, feeling of choking, hyperventilation, impotence, insomnia, malaise, muscle tension, nausea, obsessions, palpitations, panic, restlessness, shortness of breath or suicidal ideas.       Obesity  This is a chronic problem. Pertinent negatives include no abdominal pain, anorexia, arthralgias, change in bowel habit, chest pain, chills, congestion, coughing, diaphoresis, fatigue, fever, headaches, joint swelling, myalgias, nausea, neck pain, numbness, rash, sore throat, swollen glands, urinary symptoms, vertigo, visual change, vomiting or weakness.     Going to dermatologist every 3 months for her skin.   Rosacea.     Current Outpatient Medications   Medication Instructions    Blood Glucose Monitoring Suppl (FreeStyle Lite) w/Device kit     ciclopirox (PENLAC) 8 % solution Topical, Nightly    CRANBERRY PO Oral    EPINEPHrine (EPIPEN) 0.3 mg, Intramuscular, Once As Needed    fexofenadine (ALLEGRA) 180 mg, Oral, Nightly    glucose blood test strip Daily in the AM, fasting    glucose monitor monitoring kit 1 each, Does not apply, Daily    Lancets (accu-chek multiclix) lancets Use one daily in the AM, fasting    losartan-hydrochlorothiazide (HYZAAR) 50-12.5 MG per tablet 1 tablet, Oral, Daily    metFORMIN (GLUCOPHAGE) 500 mg, Oral, 2 Times Daily With Meals    metroNIDAZOLE (FLAGYL) 500 mg, Oral, 2 Times Daily    Mounjaro 5 mg, Subcutaneous, Weekly    multivitamin (MULTIVITAMIN PO) Oral, Daily    rosuvastatin (CRESTOR) 5 mg, Oral, Daily       The following portions of the patient's history were reviewed and updated as appropriate:  "allergies, current medications, past family history, past medical history, past social history, past surgical history, and problem list.    Objective   Vital Signs:   /79 (BP Location: Left arm, Patient Position: Sitting, Cuff Size: Adult)   Pulse 79   Temp 96.7 °F (35.9 °C) (Temporal)   Ht 162.6 cm (64\")   Wt 83 kg (183 lb)   SpO2 96%   BMI 31.41 kg/m²     BP Readings from Last 3 Encounters:   09/05/24 116/79   06/05/24 128/84   02/02/24 119/61     Wt Readings from Last 3 Encounters:   09/05/24 83 kg (183 lb)   06/05/24 82.6 kg (182 lb)   02/02/24 84.6 kg (186 lb 9.6 oz)           Physical Exam  Constitutional:       Appearance: She is obese.          Appearance: No acute distress, well-nourished  Head: normocephalic, atraumatic  Eyes: extraocular movements intact, no scleral icterus, no conjunctival injection  Ears, Nose, and Throat: external ears normal, nares patent, moist mucous membranes  Cardiovascular: regular rate and rhythm. no murmurs, rubs, or gallops. no edema  Respiratory: breathing comfortably, symmetric chest rise, clear to auscultation bilaterally. No wheezes, rales, or rhonchi.  Neuro: alert and oriented to time, place, and person. Normal gait  Psych: normal mood and affect     Result Review :   The following data was reviewed by: KASSANDRA Wilder on 09/05/2024:  Common labs          1/26/2024    16:34 1/26/2024    16:42 6/5/2024    12:57 9/5/2024    12:39   Common Labs   Glucose 155   82  89    BUN 17   15  17    Creatinine 1.07   0.98  0.96    Sodium 138   138  139    Potassium 3.7   4.1  3.7    Chloride 96   96  100    Calcium 10.1   10.0  10.0    Albumin   4.5  4.6    Total Bilirubin   0.4  0.4    Alkaline Phosphatase   70  71    AST (SGOT)   20  21    ALT (SGPT)   22  25    WBC   6.57  6.87    Hemoglobin   13.9  13.6    Hematocrit   44.1  42.5    Platelets   289  278    Total Cholesterol   149  165    Triglycerides   135  182    HDL Cholesterol   57  53    LDL Cholesterol  "   69  81    Hemoglobin A1C   5.80  6.00    Microalbumin, Urine  <1.2               Lab Results   Component Value Date    SARSANTIGEN Not Detected 09/27/2023    COVID19 Not Detected 09/27/2023    RAPFLUA Negative 05/09/2022    RAPFLUB Negative 05/09/2022    FLUAAG Not Detected 09/27/2023    FLUBAG Not Detected 09/27/2023    RAPSCRN Negative 04/12/2023    INR 1.0 02/27/2023    BILIRUBINUR Negative 06/26/2023            Assessment and Plan    Diagnoses and all orders for this visit:    1. Mixed hyperlipidemia (Primary)  Assessment & Plan:      Lipid abnormalities are stable     Plan:  Continue same medication/s without change.       Discussed medication dosage, use, side effects, and goals of treatment in detail.    Counseled patient on lifestyle modifications to help control hyperlipidemia.   Advised patient to exercise for 150 minutes weekly. (30 minute brisk walk, 5 days a week for example)  Weight Loss encouraged     Patient Treatment Goals:   LDL goal is less than 70     Followup in 3 months.       Orders:  -     Lipid Panel  -     Comprehensive Metabolic Panel  -     CBC & Differential  -     rosuvastatin (Crestor) 5 MG tablet; Take 1 tablet by mouth Daily.  Dispense: 90 tablet; Refill: 1    2. Type 2 diabetes mellitus with hyperglycemia, without long-term current use of insulin  Comments:  will recheck a1c today; cont current regimen  Assessment & Plan:  Diabetes is  will rehceck A1C today  .   Continue current treatment regimen.  Recommended an ADA diet.  Recommended a Mediterranean style of eating  Regular aerobic exercise.  Discussed foot care.  Diabetes will be reassessed in 3 months    Orders:  -     Hemoglobin A1c  -     Mounjaro 5 MG/0.5ML solution pen-injector pen; Inject 0.5 mL under the skin into the appropriate area as directed 1 (One) Time Per Week.  Dispense: 2 mL; Refill: 3    3. Class 1 obesity with serious comorbidity and body mass index (BMI) of 31.0 to 31.9 in adult, unspecified obesity  type    4. Screening for colon cancer  -     Cologuard - Stool, Per Rectum; Future    5. Need for vaccination  -     Shingrix Vaccine    6. Encounter for screening mammogram for malignant neoplasm of breast  -     Mammo Screening Digital Tomosynthesis Bilateral With CAD; Future    7. Essential hypertension  Comments:  we will decrease hyzaar. pt advised to monitor blood pressure and keep a log to bring in for her next visit.  Assessment & Plan:  Hypertension is stable and controlled  Continue current treatment regimen.  Dietary sodium restriction.  Weight loss.  Regular aerobic exercise.  Ambulatory blood pressure monitoring.  Blood pressure will be reassessed in 3 months.    Orders:  -     Lipid Panel  -     Comprehensive Metabolic Panel  -     CBC & Differential  -     losartan-hydrochlorothiazide (HYZAAR) 50-12.5 MG per tablet; Take 1 tablet by mouth Daily.  Dispense: 90 tablet; Refill: 1    8. Gastrointestinal food sensitivity  -     Food Allergy Profile          Medications Discontinued During This Encounter   Medication Reason    Estrogens Conjugated (Premarin) 0.625 MG/GM vaginal cream     rosuvastatin (Crestor) 5 MG tablet Reorder    losartan-hydrochlorothiazide (HYZAAR) 50-12.5 MG per tablet Reorder    Mounjaro 5 MG/0.5ML solution pen-injector pen Reorder          Follow Up   Return in about 3 months (around 12/5/2024) for Depression, Anxiety, Hypertension, Hyperlipidemia, Diabetes.  Patient was given instructions and counseling regarding her condition or for health maintenance advice. Please see specific information pulled into the AVS if appropriate.       Isa Wolf, KASSANDRA  09/12/24  08:10 EDT

## 2024-09-10 ENCOUNTER — CLINICAL SUPPORT (OUTPATIENT)
Dept: INTERNAL MEDICINE | Facility: CLINIC | Age: 54
End: 2024-09-10
Payer: COMMERCIAL

## 2024-09-10 DIAGNOSIS — N89.8 VAGINAL DISCHARGE: Primary | ICD-10-CM

## 2024-09-10 LAB
CANDIDA SPECIES: NEGATIVE
CLAM IGE QN: <0.1 KU/L
CODFISH IGE QN: <0.1 KU/L
CONV CLASS DESCRIPTION: NORMAL
CORN IGE QN: <0.1 KU/L
COW MILK IGE QN: <0.1 KU/L
EGG WHITE IGE QN: <0.1 KU/L
GARDNERELLA VAGINALIS: POSITIVE
PEANUT IGE QN: <0.1 KU/L
SCALLOP IGE QN: <0.1 KU/L
SESAME SEED IGE QN: <0.1 KU/L
SHRIMP IGE QN: <0.1 KU/L
SOYBEAN IGE QN: <0.1 KU/L
T VAGINALIS DNA VAG QL PROBE+SIG AMP: NEGATIVE
WALNUT IGE QN: <0.1 KU/L
WHEAT IGE QN: <0.1 KU/L

## 2024-09-10 PROCEDURE — 87510 GARDNER VAG DNA DIR PROBE: CPT | Performed by: NURSE PRACTITIONER

## 2024-09-10 PROCEDURE — 87660 TRICHOMONAS VAGIN DIR PROBE: CPT | Performed by: NURSE PRACTITIONER

## 2024-09-10 PROCEDURE — 87480 CANDIDA DNA DIR PROBE: CPT | Performed by: NURSE PRACTITIONER

## 2024-09-11 ENCOUNTER — TELEPHONE (OUTPATIENT)
Dept: INTERNAL MEDICINE | Facility: CLINIC | Age: 54
End: 2024-09-11
Payer: COMMERCIAL

## 2024-09-11 DIAGNOSIS — B96.89 BV (BACTERIAL VAGINOSIS): Primary | ICD-10-CM

## 2024-09-11 DIAGNOSIS — N76.0 BV (BACTERIAL VAGINOSIS): Primary | ICD-10-CM

## 2024-09-11 RX ORDER — METRONIDAZOLE 500 MG/1
500 TABLET ORAL 2 TIMES DAILY
Qty: 14 TABLET | Refills: 0 | Status: SHIPPED | OUTPATIENT
Start: 2024-09-11 | End: 2024-09-18

## 2024-09-11 NOTE — TELEPHONE ENCOUNTER
"Relay   HUB OK TO READ/ADVISE:  \"Positive for bacterial vaginosis. This is not a sexually transmitted disease. It is an overgrowth of bacteria within the vagina.   I am sending in flagyl (which is an antibiotic) x 7 days.   Avoid intercourse until completion of the antibiotic.   Also avoid alcohol while taking the Flagyl.   A1c 6.0.  Good control diabetes.     Glycerides are slightly elevated.  Would recommend watching carb and sugar intake     Other labs reassuring\"        "

## 2024-09-11 NOTE — TELEPHONE ENCOUNTER
----- Message from Isa Wolf sent at 9/11/2024  9:34 AM EDT -----  Positive for bacterial vaginosis. This is not a sexually transmitted disease. It is an overgrowth of bacteria within the vagina.   I am sending in flagyl (which is an antibiotic) x 7 days.   Avoid intercourse until completion of the antibiotic.   Also avoid alcohol while taking the Flagyl.

## 2024-09-13 NOTE — TELEPHONE ENCOUNTER
"2nd attempt to contact patient. Left message to call the office back.     Relay   HUB OK TO READ/ADVISE:  \"Positive for bacterial vaginosis. This is not a sexually transmitted disease. It is an overgrowth of bacteria within the vagina.   I am sending in flagyl (which is an antibiotic) x 7 days.   Avoid intercourse until completion of the antibiotic.   Also avoid alcohol while taking the Flagyl.   A1c 6.0.  Good control diabetes.     Glycerides are slightly elevated.  Would recommend watching carb and sugar intake     Other labs reassuring\"  "

## 2024-09-16 ENCOUNTER — TELEPHONE (OUTPATIENT)
Dept: INTERNAL MEDICINE | Facility: CLINIC | Age: 54
End: 2024-09-16

## 2024-09-16 PROCEDURE — 87086 URINE CULTURE/COLONY COUNT: CPT | Performed by: NURSE PRACTITIONER

## 2024-09-16 NOTE — TELEPHONE ENCOUNTER
Caller: Holly Apple    Relationship to patient: Self    Best call back number: [phone]    Chief complaint: POSSIBLE SINUS INFECTION    Type of visit: SAME DAY    Requested date: 9/16/2024    If rescheduling, when is the original appointment: NA    Additional notes:PATIENT IS FEELING LIKE SHE MIGHT HAVE A SINUS INFECTION AND WOULD LIKE TO BE SEEN TODAY IF POSSIBLE. PATIENT IS NEEDING AN APPOINTMENT AFTER 2 P.M. IF POSSIBLE PLEASE.

## 2024-09-16 NOTE — TELEPHONE ENCOUNTER
"   3rd attempt to contact patient. Left message to call the office back.           Relay   HUB OK TO READ/ADVISE:  \"Positive for bacterial vaginosis. This is not a sexually transmitted disease. It is an overgrowth of bacteria within the vagina.   I am sending in flagyl (which is an antibiotic) x 7 days.   Avoid intercourse until completion of the antibiotic.   Also avoid alcohol while taking the Flagyl.   A1c 6.0.  Good control diabetes.     Glycerides are slightly elevated.  Would recommend watching carb and sugar intake     Other labs reassuring\"        "

## 2024-09-19 ENCOUNTER — TELEPHONE (OUTPATIENT)
Dept: INTERNAL MEDICINE | Facility: CLINIC | Age: 54
End: 2024-09-19
Payer: COMMERCIAL

## 2024-11-11 NOTE — PROGRESS NOTES
Chief Complaint  Arthritis (Patient was told by Dr. Vital she has Arthritis but patient feels it is more serious than what she was told. She is having swelling in her hands and her fingers are changing direction. )    Debbie Apple presents to [facility]  Arthritis  Presents for initial visit. The disease course has been worsening. Affected locations include the right MCP and left MCP. Associated symptoms include pain at night and pain while resting. Pertinent negatives include no diarrhea, dry eyes, dry mouth, dysuria, fatigue, fever, rash, Raynaud's syndrome, uveitis or weight loss. Her pertinent risk factors include overuse.   Knee Pain   Incident onset: Chronic. There was no injury mechanism. The pain is present in the right knee and left knee. The pain has been Constant since onset. Pertinent negatives include no inability to bear weight, loss of motion, loss of sensation, muscle weakness, numbness or tingling.   Sinusitis  This is a new problem. The problem has been rapidly worsening since onset. Associated symptoms include sinus pressure. Pertinent negatives include no chills, congestion, coughing, diaphoresis, ear pain, headaches, hoarse voice, neck pain, shortness of breath, sneezing, sore throat or swollen glands.       History of Present Illness      Current Outpatient Medications   Medication Instructions    amoxicillin-clavulanate (AUGMENTIN) 875-125 MG per tablet 1 tablet, Oral, 2 Times Daily    Blood Glucose Monitoring Suppl (FreeStyle Lite) w/Device kit     CRANBERRY PO Oral    EPINEPHrine (EPIPEN) 0.3 mg, Intramuscular, Once As Needed    fexofenadine (ALLEGRA) 180 mg, Oral, Nightly    glucose blood test strip Daily in the AM, fasting    glucose monitor monitoring kit 1 each, Not Applicable, Daily    Lancets (accu-chek multiclix) lancets Use one daily in the AM, fasting    losartan-hydrochlorothiazide (HYZAAR) 50-12.5 MG per  "tablet 1 tablet, Oral, Daily    metFORMIN (GLUCOPHAGE) 500 mg, Oral, 2 Times Daily With Meals    Mounjaro 5 mg, Subcutaneous, Weekly    multivitamin (MULTIVITAMIN PO) Oral, Daily    rosuvastatin (CRESTOR) 5 mg, Oral, Daily       The following portions of the patient's history were reviewed and updated as appropriate: allergies, current medications, past family history, past medical history, past social history, past surgical history, and problem list.    Objective   Vital Signs:   /87 (BP Location: Left arm, Patient Position: Sitting, Cuff Size: Adult)   Pulse 88   Temp 97.5 °F (36.4 °C) (Temporal)   Ht 162.6 cm (64\")   Wt 83.5 kg (184 lb)   SpO2 95%   BMI 31.58 kg/m²     BP Readings from Last 3 Encounters:   11/14/24 131/87   09/16/24 136/80   09/05/24 116/79     Wt Readings from Last 3 Encounters:   11/14/24 83.5 kg (184 lb)   09/16/24 82.7 kg (182 lb 6.4 oz)   09/05/24 83 kg (183 lb)           Physical Exam  HENT:      Nose:      Right Sinus: Maxillary sinus tenderness present.   Musculoskeletal:      Right knee: Tenderness present.      Left knee: Tenderness present.          Appearance: No acute distress, well-nourished  Head: normocephalic, atraumatic  Eyes: extraocular movements intact, no scleral icterus, no conjunctival injection  Ears, Nose, and Throat: external ears normal, nares patent, moist mucous membranes  Cardiovascular: regular rate and rhythm. no murmurs, rubs, or gallops. no edema  Respiratory: breathing comfortably, symmetric chest rise, clear to auscultation bilaterally. No wheezes, rales, or rhonchi.  Neuro: alert and oriented to time, place, and person. Normal gait  Psych: normal mood and affect     Physical Exam        Result Review :   The following data was reviewed by: KASSANDRA Wilder on 11/14/2024:  Common labs          1/26/2024    16:34 1/26/2024    16:42 6/5/2024    12:57 9/5/2024    12:39   Common Labs   Glucose 155   82  89    BUN 17   15  17    Creatinine 1.07 "   0.98  0.96    Sodium 138   138  139    Potassium 3.7   4.1  3.7    Chloride 96   96  100    Calcium 10.1   10.0  10.0    Albumin   4.5  4.6    Total Bilirubin   0.4  0.4    Alkaline Phosphatase   70  71    AST (SGOT)   20  21    ALT (SGPT)   22  25    WBC   6.57  6.87    Hemoglobin   13.9  13.6    Hematocrit   44.1  42.5    Platelets   289  278    Total Cholesterol   149  165    Triglycerides   135  182    HDL Cholesterol   57  53    LDL Cholesterol    69  81    Hemoglobin A1C   5.80  6.00    Microalbumin, Urine  <1.2          Results           Lab Results   Component Value Date    SARSANTIGEN Not Detected 09/27/2023    COVID19 Not Detected 09/27/2023    RAPFLUA Negative 05/09/2022    RAPFLUB Negative 05/09/2022    FLUAAG Not Detected 09/27/2023    FLUBAG Not Detected 09/27/2023    RAPSCRN Negative 04/12/2023    INR 1.0 02/27/2023    BILIRUBINUR Negative 09/16/2024            Assessment and Plan    Diagnoses and all orders for this visit:    1. Need for vaccination (Primary)  -     Shingrix Vaccine    2. Arthralgia of hand, unspecified laterality  -     Sedimentation rate  -     Rheumatoid Factor  -     CK  -     JOHN PAUL Direct Reflex to 11 [employer]  -     C-reactive protein    3. Chronic pain of both knees  -     Ambulatory Referral to Physical Therapy for Evaluation & Treatment    4. Acute non-recurrent maxillary sinusitis  -     amoxicillin-clavulanate (AUGMENTIN) 875-125 MG per tablet; Take 1 tablet by mouth 2 (Two) Times a Day for 10 days.  Dispense: 20 tablet; Refill: 0        Assessment & Plan      There are no discontinued medications.       Follow Up   Return if symptoms worsen or fail to improve.  Patient was given instructions and counseling regarding her condition or for health maintenance advice. Please see specific information pulled into the AVS if appropriate.       Isa Wolf, APRN  11/14/24  13:10 EST      Patient or patient representative verbalized consent for the use of Ambient Listening  during the visit with  KASSANDRA Wilder for chart documentation. 11/14/2024  13:10 EST

## 2024-11-14 ENCOUNTER — OFFICE VISIT (OUTPATIENT)
Dept: INTERNAL MEDICINE | Facility: CLINIC | Age: 54
End: 2024-11-14
Payer: COMMERCIAL

## 2024-11-14 VITALS
SYSTOLIC BLOOD PRESSURE: 131 MMHG | DIASTOLIC BLOOD PRESSURE: 87 MMHG | HEIGHT: 64 IN | BODY MASS INDEX: 31.41 KG/M2 | WEIGHT: 184 LBS | OXYGEN SATURATION: 95 % | TEMPERATURE: 97.5 F | HEART RATE: 88 BPM

## 2024-11-14 DIAGNOSIS — Z23 NEED FOR VACCINATION: Primary | ICD-10-CM

## 2024-11-14 DIAGNOSIS — M25.561 CHRONIC PAIN OF BOTH KNEES: ICD-10-CM

## 2024-11-14 DIAGNOSIS — M25.562 CHRONIC PAIN OF BOTH KNEES: ICD-10-CM

## 2024-11-14 DIAGNOSIS — J01.00 ACUTE NON-RECURRENT MAXILLARY SINUSITIS: ICD-10-CM

## 2024-11-14 DIAGNOSIS — M25.549 ARTHRALGIA OF HAND, UNSPECIFIED LATERALITY: ICD-10-CM

## 2024-11-14 DIAGNOSIS — G89.29 CHRONIC PAIN OF BOTH KNEES: ICD-10-CM

## 2024-11-14 LAB
CHROMATIN AB SERPL-ACNC: 20 IU/ML (ref 0–14)
CK SERPL-CCNC: 128 U/L (ref 20–180)
CRP SERPL-MCNC: 0.43 MG/DL (ref 0–0.5)
ERYTHROCYTE [SEDIMENTATION RATE] IN BLOOD: 9 MM/HR (ref 0–30)

## 2024-11-14 PROCEDURE — 86140 C-REACTIVE PROTEIN: CPT | Performed by: NURSE PRACTITIONER

## 2024-11-14 PROCEDURE — 86038 ANTINUCLEAR ANTIBODIES: CPT | Performed by: NURSE PRACTITIONER

## 2024-11-14 PROCEDURE — 85652 RBC SED RATE AUTOMATED: CPT | Performed by: NURSE PRACTITIONER

## 2024-11-14 PROCEDURE — 82550 ASSAY OF CK (CPK): CPT | Performed by: NURSE PRACTITIONER

## 2024-11-14 PROCEDURE — 86431 RHEUMATOID FACTOR QUANT: CPT | Performed by: NURSE PRACTITIONER

## 2024-11-14 NOTE — LETTER
[facility]  Vaccine Consent Form    Patient Name:  Holly Apple  Patient :  1970     Vaccine(s) Ordered    Shingrix Vaccine        Screening Checklist  The following questions should be completed prior to vaccination. If you answer “yes” to any question, it does not necessarily mean you should not be vaccinated. It just means we may need to clarify or ask more questions. If a question is unclear, please ask your healthcare provider to explain it.    Yes No   Any fever or moderate to severe illness today (mild illness and/or antibiotic treatment are not contraindications)?     Do you have a history of a serious reaction to any previous vaccinations, such as anaphylaxis, encephalopathy within 7 days, Guillain-Calcium syndrome within 6 weeks, seizure?     Have you received any live vaccine(s) (e.g MMR, ADRIAN) or any other vaccines in the last month (to ensure duplicate doses aren't given)?     Do you have an anaphylactic allergy to latex (DTaP, DTaP-IPV, Hep A, Hep B, MenB, RV, Td, Tdap), baker’s yeast (Hep B, HPV), polysorbates (RSV, nirsevimab, PCV 20, Rotavirrus, Tdap, Shingrix), or gelatin (ADRIAN, MMR)?     Do you have an anaphylactic allergy to neomycin (Rabies, ADRIAN, MMR, IPV, Hep A), polymyxin B (IPV), or streptomycin (IPV)?      Any cancer, leukemia, AIDS, or other immune system disorder? (ADRIAN, MMR, RV)     Do you have a parent, brother, or sister with an immune system problem (if immune competence of vaccine recipient clinically verified, can proceed)? (MMR, ADRIAN)     Any recent steroid treatments for >2 weeks, chemotherapy, or radiation treatment? (ADRIAN, MMR)     Have you received antibody-containing blood transfusions or IVIG in the past 11 months (recommended interval is dependent on product)? (MMR, ADRIAN)     Have you taken antiviral drugs (acyclovir, famciclovir, valacyclovir for ADRIAN) in the last 24 or 48 hours, respectively?      Are you pregnant or planning to become pregnant within 1 month? (ADRIAN,  "MMR, HPV, IPV, MenB, Abrexvy; For Hep B- refer to Engerix-B; For RSV - Abrysvo is indicated for 32-36 weeks of pregnancy from September to January)     For infants, have you ever been told your child has had intussusception or a medical emergency involving obstruction of the intestine (Rotavirus)? If not for an infant, can skip this question.         *Ordering Physicians/APC should be consulted if \"yes\" is checked by the patient or guardian above.  I have received, read, and understand the Vaccine Information Statement (VIS) for each vaccine ordered.  I have considered my or my child's health status as well as the health status of my close contacts.  I have taken the opportunity to discuss my vaccine questions with my or my child's health care provider.   I have requested that the ordered vaccine(s) be given to me or my child.  I understand the benefits and risks of the vaccines.  I understand that I should remain in the clinic for 15 minutes after receiving the vaccine(s).  _________________________________________________________  Signature of Patient or Parent/Legal Guardian ____________________  Date     "

## 2024-11-18 LAB — ANA SER QL: NEGATIVE

## 2024-11-19 DIAGNOSIS — M05.80 POLYARTHRITIS WITH POSITIVE RHEUMATOID FACTOR: Primary | ICD-10-CM

## 2024-11-20 ENCOUNTER — TELEPHONE (OUTPATIENT)
Dept: INTERNAL MEDICINE | Facility: CLINIC | Age: 54
End: 2024-11-20
Payer: COMMERCIAL

## 2024-11-20 NOTE — TELEPHONE ENCOUNTER
----- Message from Isa Wolf sent at 11/19/2024  1:02 PM EST -----  Positive rheumatoid factor, but JOHN PAUL is negative.  This could be a false positive.  I will go ahead and place rheumatology referral.   No

## 2024-11-20 NOTE — TELEPHONE ENCOUNTER
"Attempted to contact patient. Left message to call the office back.    Relay   HUB ok to read/advise  \"Positive rheumatoid factor, but JOHN PAUL is negative. This could be a false positive. I will go ahead and place rheumatology referral.\"      "

## 2024-11-20 NOTE — TELEPHONE ENCOUNTER
Name: Holly Apple    Relationship: Self    Best Callback Number: [phone]     HUB PROVIDED THE RELAY MESSAGE FROM THE OFFICE   PATIENT VOICED UNDERSTANDING AND HAS NO FURTHER QUESTIONS AT THIS TIME    ADDITIONAL INFORMATION:

## 2024-12-02 NOTE — PROGRESS NOTES
Chief Complaint  Hypertension (3 month follow-up), Hyperlipidemia (3 month follow-up/), Diabetes (3 month follow-up/), and Vaginal Discharge (Patient is concerned for vaginal infection and would like to do vag screen before leaving today.)    Debbie Apple presents to [facility]  Vaginal Discharge  The patient's primary symptoms include vaginal discharge. The patient's pertinent negatives include no genital itching, genital lesions, genital odor, genital rash, missed menses, pelvic pain or vaginal bleeding. This is a new problem. Pertinent negatives include no abdominal pain, anorexia, back pain, chills, constipation, diarrhea, discolored urine, dysuria, fever, flank pain, frequency, headaches, hematuria, joint pain, joint swelling, nausea, painful intercourse, rash, sore throat, urgency or vomiting.       History of Present Illness          Hyperlipidemia  This is a chronic problem. Exacerbating diseases include diabetes and obesity. She has no history of chronic renal disease, hypothyroidism, liver disease or nephrotic syndrome. Pertinent negatives include no chest pain, focal sensory loss, focal weakness, leg pain, myalgias or shortness of breath.   Hypertension  This is a chronic problem. Associated symptoms include anxiety. Pertinent negatives include no blurred vision, chest pain, headaches, malaise/fatigue, neck pain, orthopnea, palpitations, peripheral edema, PND, shortness of breath or sweats. Compliance problems include diet and exercise.  There is no history of angina, kidney disease, CAD/MI, CVA, heart failure, left ventricular hypertrophy or PVD. There is no history of chronic renal disease.   Diabetes  She presents for her initial diabetic visit. She has type 2 diabetes mellitus. Hypoglycemia symptoms include nervousness/anxiousness. Pertinent negatives for hypoglycemia include no confusion, dizziness, headaches, hunger, mood  changes, pallor, seizures, sleepiness, speech difficulty, sweats or tremors. There are no diabetic associated symptoms. Pertinent negatives for diabetes include no blurred vision, no chest pain, no fatigue, no foot paresthesias, no foot ulcerations, no polydipsia, no polyphagia, no polyuria, no visual change, no weakness and no weight loss. There are no hypoglycemic complications. Symptoms are stable. There are no diabetic complications. Pertinent negatives for diabetic complications include no CVA, impotence or PVD. Risk factors for coronary artery disease include dyslipidemia, family history, obesity, hypertension, stress and post-menopausal.   Anxiety  Presents for follow-up visit. Symptoms include decreased concentration, depressed mood, irritability and nervous/anxious behavior. Patient reports no chest pain, compulsions, confusion, dizziness, dry mouth, excessive worry, feeling of choking, hyperventilation, impotence, insomnia, malaise, muscle tension, nausea, obsessions, palpitations, panic, restlessness, shortness of breath or suicidal ideas.       Obesity  This is a chronic problem. Pertinent negatives include no abdominal pain, anorexia, arthralgias, change in bowel habit, chest pain, chills, congestion, coughing, diaphoresis, fatigue, fever, headaches, joint swelling, myalgias, nausea, neck pain, numbness, rash, sore throat, swollen glands, urinary symptoms, vertigo, visual change, vomiting or weakness.         Current Outpatient Medications   Medication Instructions    Blood Glucose Monitoring Suppl (FreeStyle Lite) w/Device kit     CRANBERRY PO Take  by mouth.    EPINEPHrine (EPIPEN) 0.3 mg, Intramuscular, Once As Needed    fexofenadine (ALLEGRA) 180 mg, Nightly    glucose blood test strip Daily in the AM, fasting    glucose monitor monitoring kit 1 each, Not Applicable, Daily    Lancets (accu-chek multiclix) lancets Use one daily in the AM, fasting    losartan-hydrochlorothiazide (HYZAAR) 50-12.5 MG  "per tablet 1 tablet, Oral, Daily    metFORMIN (GLUCOPHAGE) 500 mg, Oral, 2 Times Daily With Meals    Mounjaro 5 mg, Subcutaneous, Weekly    multivitamin (MULTIVITAMIN PO) Daily    Omega-3 Fatty Acids (FISH OIL PO) Daily    rosuvastatin (CRESTOR) 5 mg, Oral, Daily       The following portions of the patient's history were reviewed and updated as appropriate: allergies, current medications, past family history, past medical history, past social history, past surgical history, and problem list.    Objective   Vital Signs:   /72 (BP Location: Right arm, Patient Position: Sitting, Cuff Size: Adult)   Pulse 92   Temp 97.3 °F (36.3 °C) (Temporal)   Ht 162.6 cm (64\")   Wt 83.5 kg (184 lb)   SpO2 96%   BMI 31.58 kg/m²     BP Readings from Last 3 Encounters:   12/05/24 104/72   11/14/24 131/87   09/16/24 136/80     Wt Readings from Last 3 Encounters:   12/05/24 83.5 kg (184 lb)   11/14/24 83.5 kg (184 lb)   09/16/24 82.7 kg (182 lb 6.4 oz)           Physical Exam     Appearance: No acute distress, well-nourished  Head: normocephalic, atraumatic  Eyes: extraocular movements intact, no scleral icterus, no conjunctival injection  Ears, Nose, and Throat: external ears normal, nares patent, moist mucous membranes  Cardiovascular: regular rate and rhythm. no murmurs, rubs, or gallops. no edema  Respiratory: breathing comfortably, symmetric chest rise, clear to auscultation bilaterally. No wheezes, rales, or rhonchi.  Neuro: alert and oriented to time, place, and person. Normal gait  Psych: normal mood and affect     Physical Exam        Result Review :   The following data was reviewed by: KASSANDRA Wilder on 12/05/2024:  Common labs          1/26/2024    16:34 1/26/2024    16:42 6/5/2024    12:57 9/5/2024    12:39   Common Labs   Glucose 155   82  89    BUN 17   15  17    Creatinine 1.07   0.98  0.96    Sodium 138   138  139    Potassium 3.7   4.1  3.7    Chloride 96   96  100    Calcium 10.1   10.0  10.0  "   Albumin   4.5  4.6    Total Bilirubin   0.4  0.4    Alkaline Phosphatase   70  71    AST (SGOT)   20  21    ALT (SGPT)   22  25    WBC   6.57  6.87    Hemoglobin   13.9  13.6    Hematocrit   44.1  42.5    Platelets   289  278    Total Cholesterol   149  165    Triglycerides   135  182    HDL Cholesterol   57  53    LDL Cholesterol    69  81    Hemoglobin A1C   5.80  6.00    Microalbumin, Urine  <1.2          Results           Lab Results   Component Value Date    SARSANTIGEN Not Detected 09/27/2023    COVID19 Not Detected 09/27/2023    RAPFLUA Negative 05/09/2022    RAPFLUB Negative 05/09/2022    FLUAAG Not Detected 09/27/2023    FLUBAG Not Detected 09/27/2023    RAPSCRN Negative 04/12/2023    INR 1.0 02/27/2023    BILIRUBINUR Negative 09/16/2024            Assessment and Plan    Diagnoses and all orders for this visit:    1. Mixed hyperlipidemia (Primary)  Assessment & Plan:      Lipid abnormalities : will recheck today      Plan:  Continue same medication/s without change.       Discussed medication dosage, use, side effects, and goals of treatment in detail.    Counseled patient on lifestyle modifications to help control hyperlipidemia.   Advised patient to exercise for 150 minutes weekly. (30 minute brisk walk, 5 days a week for example)  Weight Loss encouraged     Patient Treatment Goals:   LDL goal is less than 70     Followup in 3 months.       Orders:  -     CBC w AUTO Differential  -     Comprehensive metabolic panel  -     Lipid panel    2. Essential hypertension  Assessment & Plan:  Hypertension is stable and controlled  Continue current treatment regimen.  Dietary sodium restriction.  Weight loss.  Regular aerobic exercise.  Ambulatory blood pressure monitoring.  Blood pressure will be reassessed in 3 months.    Orders:  -     CBC w AUTO Differential  -     Comprehensive metabolic panel  -     Lipid panel    3. Type 2 diabetes mellitus with hyperglycemia, without long-term current use of insulin  -      Hemoglobin A1c    4. Vaginal discharge  -     Gardnerella vaginalis, Trichomonas vaginalis, Candida albicans, DNA - Swab, Vagina        Assessment & Plan      There are no discontinued medications.       Follow Up   Return in about 3 months (around 3/5/2025) for Anxiety, Depression, Hypertension, Hyperlipidemia, Diabetes.  Patient was given instructions and counseling regarding her condition or for health maintenance advice. Please see specific information pulled into the AVS if appropriate.       KASSANDRA Wilder  12/05/24  15:02 EST      Patient or patient representative verbalized consent for the use of Ambient Listening during the visit with  KASSANDRA Wilder for chart documentation. 12/5/2024  15:01 EST

## 2024-12-05 ENCOUNTER — OFFICE VISIT (OUTPATIENT)
Dept: INTERNAL MEDICINE | Facility: CLINIC | Age: 54
End: 2024-12-05
Payer: COMMERCIAL

## 2024-12-05 VITALS
WEIGHT: 184 LBS | HEART RATE: 92 BPM | HEIGHT: 64 IN | BODY MASS INDEX: 31.41 KG/M2 | DIASTOLIC BLOOD PRESSURE: 72 MMHG | OXYGEN SATURATION: 96 % | SYSTOLIC BLOOD PRESSURE: 104 MMHG | TEMPERATURE: 97.3 F

## 2024-12-05 DIAGNOSIS — E78.2 MIXED HYPERLIPIDEMIA: Primary | ICD-10-CM

## 2024-12-05 DIAGNOSIS — I10 ESSENTIAL HYPERTENSION: ICD-10-CM

## 2024-12-05 DIAGNOSIS — E11.65 TYPE 2 DIABETES MELLITUS WITH HYPERGLYCEMIA, WITHOUT LONG-TERM CURRENT USE OF INSULIN: ICD-10-CM

## 2024-12-05 DIAGNOSIS — N89.8 VAGINAL DISCHARGE: ICD-10-CM

## 2024-12-05 LAB
BASOPHILS # BLD AUTO: 0.05 10*3/MM3 (ref 0–0.2)
BASOPHILS NFR BLD AUTO: 0.7 % (ref 0–1.5)
CANDIDA SPECIES: NEGATIVE
DEPRECATED RDW RBC AUTO: 39.1 FL (ref 37–54)
EOSINOPHIL # BLD AUTO: 0.2 10*3/MM3 (ref 0–0.4)
EOSINOPHIL NFR BLD AUTO: 2.9 % (ref 0.3–6.2)
ERYTHROCYTE [DISTWIDTH] IN BLOOD BY AUTOMATED COUNT: 13.5 % (ref 12.3–15.4)
GARDNERELLA VAGINALIS: POSITIVE
HCT VFR BLD AUTO: 43.1 % (ref 34–46.6)
HGB BLD-MCNC: 14.3 G/DL (ref 12–15.9)
IMM GRANULOCYTES # BLD AUTO: 0.01 10*3/MM3 (ref 0–0.05)
IMM GRANULOCYTES NFR BLD AUTO: 0.1 % (ref 0–0.5)
LYMPHOCYTES # BLD AUTO: 2.92 10*3/MM3 (ref 0.7–3.1)
LYMPHOCYTES NFR BLD AUTO: 42.4 % (ref 19.6–45.3)
MCH RBC QN AUTO: 26.5 PG (ref 26.6–33)
MCHC RBC AUTO-ENTMCNC: 33.2 G/DL (ref 31.5–35.7)
MCV RBC AUTO: 80 FL (ref 79–97)
MONOCYTES # BLD AUTO: 0.46 10*3/MM3 (ref 0.1–0.9)
MONOCYTES NFR BLD AUTO: 6.7 % (ref 5–12)
NEUTROPHILS NFR BLD AUTO: 3.24 10*3/MM3 (ref 1.7–7)
NEUTROPHILS NFR BLD AUTO: 47.2 % (ref 42.7–76)
NRBC BLD AUTO-RTO: 0 /100 WBC (ref 0–0.2)
PLATELET # BLD AUTO: 326 10*3/MM3 (ref 140–450)
PMV BLD AUTO: 9 FL (ref 6–12)
RBC # BLD AUTO: 5.39 10*6/MM3 (ref 3.77–5.28)
T VAGINALIS DNA VAG QL PROBE+SIG AMP: NEGATIVE
WBC NRBC COR # BLD AUTO: 6.88 10*3/MM3 (ref 3.4–10.8)

## 2024-12-05 PROCEDURE — 83036 HEMOGLOBIN GLYCOSYLATED A1C: CPT | Performed by: NURSE PRACTITIONER

## 2024-12-05 PROCEDURE — 85025 COMPLETE CBC W/AUTO DIFF WBC: CPT | Performed by: NURSE PRACTITIONER

## 2024-12-05 PROCEDURE — 87660 TRICHOMONAS VAGIN DIR PROBE: CPT | Performed by: NURSE PRACTITIONER

## 2024-12-05 PROCEDURE — 3044F HG A1C LEVEL LT 7.0%: CPT | Performed by: NURSE PRACTITIONER

## 2024-12-05 PROCEDURE — 99214 OFFICE O/P EST MOD 30 MIN: CPT | Performed by: NURSE PRACTITIONER

## 2024-12-05 PROCEDURE — 87510 GARDNER VAG DNA DIR PROBE: CPT | Performed by: NURSE PRACTITIONER

## 2024-12-05 PROCEDURE — 80061 LIPID PANEL: CPT | Performed by: NURSE PRACTITIONER

## 2024-12-05 PROCEDURE — 3074F SYST BP LT 130 MM HG: CPT | Performed by: NURSE PRACTITIONER

## 2024-12-05 PROCEDURE — 3078F DIAST BP <80 MM HG: CPT | Performed by: NURSE PRACTITIONER

## 2024-12-05 PROCEDURE — 80053 COMPREHEN METABOLIC PANEL: CPT | Performed by: NURSE PRACTITIONER

## 2024-12-05 PROCEDURE — 87480 CANDIDA DNA DIR PROBE: CPT | Performed by: NURSE PRACTITIONER

## 2024-12-05 NOTE — ASSESSMENT & PLAN NOTE
Lipid abnormalities : will recheck today      Plan:  Continue same medication/s without change.       Discussed medication dosage, use, side effects, and goals of treatment in detail.    Counseled patient on lifestyle modifications to help control hyperlipidemia.   Advised patient to exercise for 150 minutes weekly. (30 minute brisk walk, 5 days a week for example)  Weight Loss encouraged     Patient Treatment Goals:   LDL goal is less than 70     Followup in 3 months.

## 2024-12-06 ENCOUNTER — TELEPHONE (OUTPATIENT)
Dept: INTERNAL MEDICINE | Facility: CLINIC | Age: 54
End: 2024-12-06
Payer: COMMERCIAL

## 2024-12-06 DIAGNOSIS — B96.89 BV (BACTERIAL VAGINOSIS): Primary | ICD-10-CM

## 2024-12-06 DIAGNOSIS — N76.0 BV (BACTERIAL VAGINOSIS): Primary | ICD-10-CM

## 2024-12-06 LAB
ALBUMIN SERPL-MCNC: 4.3 G/DL (ref 3.5–5.2)
ALBUMIN/GLOB SERPL: 1.3 G/DL
ALP SERPL-CCNC: 66 U/L (ref 39–117)
ALT SERPL W P-5'-P-CCNC: 23 U/L (ref 1–33)
ANION GAP SERPL CALCULATED.3IONS-SCNC: 12.8 MMOL/L (ref 5–15)
AST SERPL-CCNC: 25 U/L (ref 1–32)
BILIRUB SERPL-MCNC: 0.4 MG/DL (ref 0–1.2)
BUN SERPL-MCNC: 20 MG/DL (ref 6–20)
BUN/CREAT SERPL: 18 (ref 7–25)
CALCIUM SPEC-SCNC: 9.6 MG/DL (ref 8.6–10.5)
CHLORIDE SERPL-SCNC: 97 MMOL/L (ref 98–107)
CHOLEST SERPL-MCNC: 179 MG/DL (ref 0–200)
CO2 SERPL-SCNC: 26.2 MMOL/L (ref 22–29)
CREAT SERPL-MCNC: 1.11 MG/DL (ref 0.57–1)
EGFRCR SERPLBLD CKD-EPI 2021: 58.5 ML/MIN/1.73
GLOBULIN UR ELPH-MCNC: 3.2 GM/DL
GLUCOSE SERPL-MCNC: 90 MG/DL (ref 65–99)
HBA1C MFR BLD: 5.9 % (ref 4.8–5.6)
HDLC SERPL-MCNC: 49 MG/DL (ref 40–60)
LDLC SERPL CALC-MCNC: 83 MG/DL (ref 0–100)
LDLC/HDLC SERPL: 1.47 {RATIO}
POTASSIUM SERPL-SCNC: 3.7 MMOL/L (ref 3.5–5.2)
PROT SERPL-MCNC: 7.5 G/DL (ref 6–8.5)
SODIUM SERPL-SCNC: 136 MMOL/L (ref 136–145)
TRIGL SERPL-MCNC: 289 MG/DL (ref 0–150)
VLDLC SERPL-MCNC: 47 MG/DL (ref 5–40)

## 2024-12-06 RX ORDER — METRONIDAZOLE 500 MG/1
500 TABLET ORAL 2 TIMES DAILY
Qty: 14 TABLET | Refills: 0 | Status: SHIPPED | OUTPATIENT
Start: 2024-12-06 | End: 2024-12-13

## 2024-12-06 NOTE — TELEPHONE ENCOUNTER
"Raulito Melo (76 y.o. Male) [id number]  admit 11/12  [facility] phone    fax          Date of Birth   1946    Social Security Number       Address   [address] [place], [place] [address]    Home Phone   [phone]    MRN   [id number]       Baptism   Restorationism    Marital Status   Single                            Admission Date   11/12/23    Admission Type   Emergency    Admitting Provider   Richardson Marina MD    Attending Provider   Richardson Marina MD    Department, Room/Bed   [facility] 3C, 389/1       Discharge Date       Discharge Disposition       Discharge Destination                                 Attending Provider: Richardson Marina MD    Allergies: No Known Allergies    Isolation: None   Infection: None   Code Status: CPR    Ht: 177.8 cm (70\")   Wt: 71.8 kg (158 lb 3.2 oz)    Admission Cmt: None   Principal Problem: Acute cholecystitis [K81.0]                   Active Insurance as of 11/12/2023       Primary Coverage       Payor Plan Insurance Group Employer/Plan Group    HUMANA MEDICARE REPLACEMENT HUMANA MEDICARE REPLACEMENT [id number]       Payor Plan Address Payor Plan Phone Number Payor Plan Fax Number Effective Dates    [address] [address] [phone]  2/1/2023 - None Entered    [facility] [id number]         Subscriber Name Subscriber Birth Date Member ID       RAULITO MELO 1946 [id number]               Secondary Coverage       Payor Plan Insurance Group Employer/Plan Group    [place]        Payor Plan Address Payor Plan Phone Number Payor Plan Fax Number Effective Dates    [address] [phone]  6/30/2023 - None Entered    [facility] [address]         Subscriber Name Subscriber Birth Date Member ID       RAULITO MELO 1946 [id number]                     Emergency Contacts        (Rel.) Home Phone Work Phone Mobile Phone    WILL GOEL (Sister) [phone] -- " ----- Message from Isa Wolf sent at 12/6/2024 10:31 AM EST -----  Renal function went down. Dramatically increase water intake. Repeat BMP in 1 week     Positive for bacterial vaginosis. This is not a sexually transmitted disease. It is an overgrowth of bacteria within the vagina.   I am sending in flagyl (which is an antibiotic) x 7 days.   Avoid intercourse until completion of the antibiotic.   Also avoid alcohol while taking the Flagyl.      [phone]                 History & Physical        Elizabeth Chapa MD at 11/12/23 9599              [facility] Medicine Services  HISTORY AND PHYSICAL    Date of Admission: 11/12/2023  Primary Care Physician: Matt Garcia DO    Subjective   Primary Historian: Patient and sister    Chief Complaint: abdominal pain, nausea and vomiting since yesterday    Vomiting       76 years old man with a history of asthma, never smoker, history of hypertension, GERD, dyslipidemia, prostate cancer posttreatment in 2015, came to ER with his sister complaining of right upper quadrant pain and periumbilical pain started yesterday, associated with mild abdominal distention, nausea and vomiting.  The sister noticed the patient has generalized weakness and unsteady gait.  No chills or fever, no diarrhea or constipation.  In ER the patient was found dehydrated, with CHARLEEN and slightly lactic acidosis, leukocytosis and neutrophilia, CT abdomen and pelvis showing suspected acute cholecystitis.  General surgery was consulted, will see the patient for possible cholecystectomy, recommended admission under hospitalist service.  In ER he was treated with 3 L LR fluid bolus for sepsis, started on antibiotic treatment with Zosyn, blood cultures were sent, did also with morphine and Zofran.  I examined the patient in the emergency room.  Currently feeling better, less abdominal pain after IV morphine.  Discussed with her sister present at bedside.        Review of Systems   Constitutional:  Positive for activity change.   HENT: Negative.     Eyes: Negative.    Respiratory: Negative.     Cardiovascular: Negative.    Gastrointestinal:  Positive for abdominal distention, nausea and vomiting.        Right upper quadrant pain and periumbilical pain since yesterday, associated with nausea and vomiting   Endocrine: Negative.    Genitourinary: Negative.    Musculoskeletal:  Positive for gait problem.         Unsteady gait for the last 2 days   Skin: Negative.    Allergic/Immunologic: Negative.    Hematological: Negative.    Psychiatric/Behavioral: Negative.        Otherwise complete ROS reviewed and negative except as mentioned in the HPI.    Past Medical History:   Past Medical History:   Diagnosis Date    Anxiety     Hypertension      Past Surgical History:  Past Surgical History:   Procedure Laterality Date    PROSTATE SURGERY       Social History:  reports that he has never smoked. He has never used smokeless tobacco. He reports that he does not drink alcohol and does not use drugs.    Family History: family history includes Asthma in his brother; Cancer in his brother and father; Diabetes in his mother; Heart disease in his mother; Kidney disease in his mother.       Allergies:  No Known Allergies    Medications:  Prior to Admission medications    Medication Sig Start Date End Date Taking? Authorizing Provider   albuterol (ACCUNEB) 1.25 MG/3ML nebulizer solution Take 3 mL by nebulization Every 6 (Six) Hours As Needed for Wheezing. 5/11/21   Nya Germain APRN   albuterol sulfate  (90 Base) MCG/ACT inhaler Inhale 2 puffs Every 4 (Four) Hours As Needed for Wheezing. 2/4/21   DENNIS Caputo MD   aspirin 81 MG EC tablet Take 1 tablet by mouth Daily. 6/30/20   DENNIS Caputo MD   benzonatate (TESSALON) 200 MG capsule Take 1 capsule by mouth 3 (Three) Times a Day As Needed for Cough. 12/30/22   DENNIS Caputo MD   budesonide (PULMICORT) 0.5 MG/2ML nebulizer solution Take 2 mL by nebulization Daily. 3/7/22   DENNIS Caputo MD   cetirizine (zyrTEC) 10 MG tablet Take 1 tablet by mouth Daily. 6/25/21   DENNIS Caputo MD   cholecalciferol (VITAMIN D3) 25 MCG (1000 UT) tablet Take 1 tablet by mouth Daily. 6/30/20   DENNIS Caputo MD   fluticasone-salmeterol (Advair HFA) 115-21 MCG/ACT inhaler Inhale 2 puffs 2 (Two) Times a Day. 12/30/22   DENNIS Caputo MD   guaiFENesin (Mucinex) 600 MG  "12 hr tablet Take 2 tablets by mouth 2 (Two) Times a Day. Take with full glass of water. 10/11/22   Nya Germain APRN   hydroCHLOROthiazide (MICROZIDE) 12.5 MG capsule Take 1 capsule by mouth Every Morning. 7/3/23   DENNIS Caputo MD   ipratropium-albuterol (DUO-NEB) 0.5-2.5 mg/3 ml nebulizer USE 1 VIAL IN NEBULIZER EVERY 6 HOURS 4/10/23   DENNIS Caputo MD   losartan (COZAAR) 50 MG tablet Take 1 tablet by mouth Daily. 7/3/23   DENNIS Caputo MD   montelukast (SINGULAIR) 10 MG tablet Take 1 tablet by mouth Every Night. 6/30/20   DENNIS Caputo MD   pantoprazole (PROTONIX) 40 MG EC tablet Take 1 tablet by mouth Daily. 7/3/23   DENNIS Caputo MD   simvastatin (ZOCOR) 20 MG tablet Take 1 tablet by mouth Every Night. 8/4/23   DENNIS Caputo MD   tamsulosin (FLOMAX) 0.4 MG capsule 24 hr capsule Take 1 capsule by mouth Daily. 7/3/23   DENNIS Caputo MD   tiotropium bromide monohydrate (Spiriva Respimat) 2.5 MCG/ACT aerosol solution inhaler Inhale 2 puffs Daily. 12/30/22   DENNIS Caputo MD   vitamin C (ASCORBIC ACID) 500 MG tablet Take 1 tablet by mouth Daily. 6/30/20   DENNIS Caputo MD     I have utilized all available immediate resources to obtain, update, or review the patient's current medications (including all prescriptions, over-the-counter products, herbals, cannabis/cannabidiol products, and vitamin/mineral/dietary (nutritional) supplements).    Objective     Vital Signs: /79   Pulse 101   Temp 98.3 °F (36.8 °C) (Oral)   Resp 18   Ht 177.8 cm (70\")   Wt 71.8 kg (158 lb 3.2 oz)   SpO2 93%   BMI 22.70 kg/m²   Physical Exam  Vitals reviewed.   Constitutional:       Appearance: He is normal weight.   HENT:      Head: Normocephalic and atraumatic.      Right Ear: External ear normal.      Left Ear: External ear normal.      Nose: Nose normal.      Mouth/Throat:      Mouth: Mucous membranes are dry.      Pharynx: Oropharynx is clear.      Comments: Dry mucous " membrane  Poor dentition  Eyes:      Extraocular Movements: Extraocular movements intact.      Conjunctiva/sclera: Conjunctivae normal.      Pupils: Pupils are equal, round, and reactive to light.   Cardiovascular:      Rate and Rhythm: Normal rate and regular rhythm.      Pulses: Normal pulses.      Heart sounds: Normal heart sounds.   Pulmonary:      Effort: Pulmonary effort is normal.      Breath sounds: Normal breath sounds.   Abdominal:      General: Bowel sounds are normal.      Palpations: Abdomen is soft.      Tenderness: There is abdominal tenderness.      Comments: Tenderness to deep palpation in RUQ   Musculoskeletal:         General: Normal range of motion.      Cervical back: Normal range of motion and neck supple.   Skin:     General: Skin is warm and dry.      Capillary Refill: Capillary refill takes less than 2 seconds.   Neurological:      General: No focal deficit present.      Mental Status: He is alert and oriented to person, place, and time. Mental status is at baseline.   Psychiatric:         Mood and Affect: Mood normal.         Behavior: Behavior normal.         Thought Content: Thought content normal.         Judgment: Judgment normal.              Results Reviewed:  Lab Results (last 24 hours)       Procedure Component Value Units Date/Time    Lactic Acid, Plasma [[id number]]  (Abnormal) Collected: 11/12/23 1547    Specimen: Blood Updated: 11/12/23 1613     Lactate 2.3 mmol/L     Blood Culture - Blood, Arm, Right [[id number]] Collected: 11/12/23 1547    Specimen: Blood from Arm, Right Updated: 11/12/23 1559    Blood Culture - Blood, Arm, Left [[id number]] Collected: 11/12/23 1547    Specimen: Blood from Arm, Left Updated: 11/12/23 1558    Comprehensive Metabolic Panel [[id number]]  (Abnormal) Collected: 11/12/23 1326    Specimen: Blood Updated: 11/12/23 1357     Glucose 161 mg/dL      BUN 45 mg/dL      Creatinine 2.78 mg/dL      Sodium 135 mmol/L      Potassium 3.2 mmol/L      Comment: Slight  hemolysis detected by analyzer. Result may be falsely elevated.        Chloride 97 mmol/L      CO2 24.0 mmol/L      Calcium 9.2 mg/dL      Total Protein 7.4 g/dL      Albumin 3.7 g/dL      ALT (SGPT) 17 U/L      AST (SGOT) 23 U/L      Alkaline Phosphatase 72 U/L      Total Bilirubin 1.1 mg/dL      Globulin 3.7 gm/dL      A/G Ratio 1.0 g/dL      BUN/Creatinine Ratio 16.2     Anion Gap 14.0 mmol/L      eGFR 22.6 mL/min/1.73     Narrative:      GFR Normal >60  Chronic Kidney Disease <60  Kidney Failure <15    The GFR formula is only valid for adults with stable renal function between ages 18 and 70.    Magnesium [[id number]]  (Normal) Collected: 11/12/23 1326    Specimen: Blood Updated: 11/12/23 1353     Magnesium 2.2 mg/dL     Lipase [[id number]]  (Normal) Collected: 11/12/23 1326    Specimen: Blood Updated: 11/12/23 1352     Lipase 24 U/L     Protime-INR [[id number]]  (Abnormal) Collected: 11/12/23 1326    Specimen: Blood Updated: 11/12/23 1348     Protime 15.4 Seconds      INR 1.20    CBC & Differential [[id number]]  (Abnormal) Collected: 11/12/23 1326    Specimen: Blood Updated: 11/12/23 1339    Narrative:      The following orders were created for panel order CBC & Differential.  Procedure                               Abnormality         Status                     ---------                               -----------         ------                     CBC Auto Differential[[id number]]        Abnormal            Final result                 Please view results for these tests on the individual orders.    CBC Auto Differential [[id number]]  (Abnormal) Collected: 11/12/23 1326    Specimen: Blood Updated: 11/12/23 1339     WBC 21.49 10*3/mm3      RBC 4.53 10*6/mm3      Hemoglobin 14.4 g/dL      Hematocrit 42.9 %      MCV 94.7 fL      MCH 31.8 pg      MCHC 33.6 g/dL      RDW 13.0 %      RDW-SD 44.9 fl      MPV 10.2 fL      Platelets 197 10*3/mm3      Neutrophil % 86.9 %      Lymphocyte % 3.3 %      Monocyte % 8.9 %       Eosinophil % 0.0 %      Basophil % 0.2 %      Immature Grans % 0.7 %      Neutrophils, Absolute 18.65 10*3/mm3      Lymphocytes, Absolute 0.71 10*3/mm3      Monocytes, Absolute 1.92 10*3/mm3      Eosinophils, Absolute 0.01 10*3/mm3      Basophils, Absolute 0.04 10*3/mm3      Immature Grans, Absolute 0.16 10*3/mm3      nRBC 0.0 /100 WBC           Imaging Results (Last 24 Hours)       Procedure Component Value Units Date/Time    CT Abdomen Pelvis Without Contrast [[id number]] Collected: 11/12/23 1509     Updated: 11/12/23 1520    Narrative:      EXAM: CT ABDOMEN PELVIS WO CONTRAST-     INDICATION: abd pain, n/v, gfr less than 30        TECHNIQUE: Helically acquired CT images were obtained of the abdomen and  pelvis without intravenous contrast. Coronal and sagittal reformations  were performed.        DOSE LENGTH PRODUCT: 197 mGy cm. Automated exposure control was also  utilized to decrease patient radiation dose.     COMPARISON: Non available.     FINDINGS:     Lower Chest: Trace pericardial fluid. Mild atelectasis in the right lung  base     Liver: Unremarkable.     Biliary Tree: Gallbladder is distended with mild gallbladder wall  thickening and pericholecystic stranding.     Spleen: Unremarkable.     Pancreas: Unremarkable.     Adrenal Glands: Unremarkable.     Kidneys/Ureters/Bladder: Unremarkable.     Reproductive Organs: Unremarkable.     Gastrointestinal Tract: Colonic diverticulosis. Stranding about the  hepatic flexure of the large bowel, likely reactive. Appendix not  visualized, however no secondary signs of acute appendicitis.     Lymphatics: Unremarkable.     Vasculature: Mild to moderate atherosclerosis.     Peritoneum/Retroperitoneum: No additional findings     Abdominal Wall/Soft Tissues: Small fat-containing left inguinal hernia.     Osseous Structures: No acute or suspicious osseous findings.       Impression:            Findings suspicious for acute cholecystitis demonstrated by  distended  gallbladder, mild gallbladder wall thickening, and pericholecystic  stranding.        This report was signed and finalized on 11/12/2023 3:17 PM CST by Selvin Zambrano.       XR Chest 1 View [[id number]] Collected: 11/12/23 1418     Updated: 11/12/23 1424    Narrative:      XR CHEST 1 VW- 11/12/2023 1:28 PM CST     HISTORY: ams       COMPARISON: None     FINDINGS:  Upright frontal radiograph of the chest was obtained     Airspace disease in the right lung base with obscuration of the right  hemidiaphragm. The cardiomediastinal silhouette and pulmonary  vascularity are within normal limits. The osseous structures and  surrounding soft tissues demonstrate no acute abnormality.       Impression:      1.  Right lower lobe airspace disease which obscures the right  hemidiaphragm, differential including pneumonia, atelectasis or perhaps  parenchymal scarring.     This report was signed and finalized on 11/12/2023 2:21 PM CST by Dr Rj Nuñez.             I have personally reviewed and interpreted the radiology studies and ECG obtained at time of admission.     Assessment / Plan   Assessment:   Active Hospital Problems    Diagnosis     **Acute cholecystitis        Treatment Plan  The patient will be admitted to my service here at [facility].    -Acute cholecystitis with severe sepsis present at admission with endorgan dysfunction-CHARLEEN.  Right upper quadrant pain for 2 days associated with nausea and vomiting.  Slightly sinus tachycardia, leukocytosis with neutrophilia, mildly elevated lactic acid and CHARLEEN.    Treated with 3 L LR fluid bolus in ER and started on antibiotic treatment with Zosyn.  Blood cultures were sent.  General surgery was consulted    We will keep the patient n.p.o. postmidnight  Continue antibiotic treatment with Zosyn  Medication and antiemetics as needed  Repeat lactic acid until back to normal    The patient does not have a history of coronary artery disease or congestive  heart failure, no chest pain or shortness of breath.  Oxygen saturation within normal limits on room air.  The patient is medical cleared for surgery, low risk for cardiovascular complications periop.    -CHARLEEN-prerenal secondary to dehydration poor p.o. intake versus ATN  Post liters LR fluid bolus in ER.  Monitor kidney function and urine output  Avoid nephrotoxic drugs  Continue with IV fluids with normal saline at 100 cc/h.  IV fluid bolus as needed for hypotension    -Hypokalemia-replacement    - h/o HTN.  Secondary to CHARLEEN and sepsis hold hydrochlorothiazide and Cozaar    -Dyslipidemia-on statin    -GERD, on PPI prior to admission. Started on Famotidine IV.    -History of asthma, persistent, without exacerbation, currently no wheezing or cough.  Oxygen saturation within normal limits on room air  Bronchodilators as needed  Pulmonary toilet and incentive spirometry    -History of prostate cancer posttreatment in 2015.  On Flomax    -Mild cognitive impairment.  The patient is very pleasant.  Lives with his sister  Supportive care    -Generalized weakness.  If not improving with hydration and antibiotic treatment may need PT and OT evaluation.    -History of prediabetes with last hemoglobin A1c 6.3.  Follow-up with PCP.    Discharge plan-likely return home with his sister when medical stable        Medical Decision Making  Number and Complexity of problems: 3      Conditions and Status        Stabilized in ER     MDM Data  External documents reviewed: yes  Cardiac tracing (EKG, telemetry) interpretation: yes  Radiology interpretation: yes  Labs reviewed: yes       Discussed with: one sister present at bedside, one sister over the phone     Care Planning  Shared decision making: yes  Code status and discussions: full code    Disposition  Social Determinants of Health that impact treatment or disposition: yes  Estimated length of stay is 3 days.     I confirmed that the patient's advanced care plan is present, code  status is documented, and a surrogate decision maker is listed in the patient's medical record.     The patient's surrogate decision maker is sister.     The patient was seen and examined by me on 11/12/2023 at 4:10 PM.    Electronically signed by Elizabeth Chapa MD, 11/12/23, 16:43 CST.               Electronically signed by Elizabeth Chapa MD at 11/12/23 1706          Emergency Department Notes        Zoe Yadav, RN at 11/12/23 1626          Nursing report ED to floor  Abhishek Mendieta  76 y.o.  male    HPI:   Chief Complaint   Patient presents with    Vomiting       Admitting doctor:   Elizabeth Chapa MD    Consulting provider(s):  Consults       No orders found from 10/14/2023 to 11/13/2023.             Admitting diagnosis:   The primary encounter diagnosis was Acute cholecystitis. A diagnosis of Acute kidney injury was also pertinent to this visit.    Code status:   Current Code Status       Date Active Code Status Order ID Comments User Context       Not on file            Allergies:   Patient has no known allergies.    Intake and Output    Intake/Output Summary (Last 24 hours) at 11/12/2023 1626  Last data filed at 11/12/2023 1357  Gross per 24 hour   Intake 1000 ml   Output --   Net 1000 ml       Weight:       11/12/23  1247   Weight: 71.8 kg (158 lb 3.2 oz)       Most recent vitals:   Vitals:    11/12/23 1512 11/12/23 1559 11/12/23 1613 11/12/23 1616   BP:  123/65  136/79   Pulse: 108 102 98 101   Resp:       Temp:       TempSrc:       SpO2: 98% 96% 95% 93%   Weight:       Height:         Oxygen Therapy: .    Active LDAs/IV Access:   Lines, Drains & Airways       Active LDAs       Name Placement date Placement time Site Days    Peripheral IV 11/12/23 1327 Left Antecubital 11/12/23  1327  Antecubital  less than 1    Peripheral IV 11/12/23 1555 Posterior;Right Hand 11/12/23  1555  Hand  less than 1                    Labs (abnormal labs have a star):   Labs Reviewed   COMPREHENSIVE METABOLIC PANEL  - Abnormal; Notable for the following components:       Result Value    Glucose 161 (*)     BUN 45 (*)     Creatinine 2.78 (*)     Sodium 135 (*)     Potassium 3.2 (*)     Chloride 97 (*)     eGFR 22.6 (*)     All other components within normal limits    Narrative:     GFR Normal >60  Chronic Kidney Disease <60  Kidney Failure <15    The GFR formula is only valid for adults with stable renal function between ages 18 and 70.   PROTIME-INR - Abnormal; Notable for the following components:    Protime 15.4 (*)     INR 1.20 (*)     All other components within normal limits   CBC WITH AUTO DIFFERENTIAL - Abnormal; Notable for the following components:    WBC 21.49 (*)     Neutrophil % 86.9 (*)     Lymphocyte % 3.3 (*)     Eosinophil % 0.0 (*)     Immature Grans % 0.7 (*)     Neutrophils, Absolute 18.65 (*)     Monocytes, Absolute 1.92 (*)     Immature Grans, Absolute 0.16 (*)     All other components within normal limits   LACTIC ACID, PLASMA - Abnormal; Notable for the following components:    Lactate 2.3 (*)     All other components within normal limits   LIPASE - Normal   MAGNESIUM - Normal   BLOOD CULTURE   BLOOD CULTURE   LACTIC ACID, REFLEX   CBC AND DIFFERENTIAL    Narrative:     The following orders were created for panel order CBC & Differential.  Procedure                               Abnormality         Status                     ---------                               -----------         ------                     CBC Auto Differential[[id number]]        Abnormal            Final result                 Please view results for these tests on the individual orders.       Meds given in ED:   Medications   sodium chloride 0.9 % flush 10 mL (has no administration in time range)   ondansetron (ZOFRAN) injection 4 mg (4 mg Intravenous Given 11/12/23 1329)   famotidine (PEPCID) injection 20 mg (20 mg Intravenous Given 11/12/23 1331)   lactated ringers bolus 1,000 mL (0 mL Intravenous Stopped 11/12/23 1357)   lactated  ringers bolus 1,000 mL (1,000 mL Intravenous New Bag 11/12/23 1522)   morphine injection 4 mg (4 mg Intravenous Given 11/12/23 1524)   piperacillin-tazobactam (ZOSYN) IVPB 3.375 g in 100 mL NS (CD) (3.375 g Intravenous New Bag 11/12/23 1556)   lactated ringers bolus 1,000 mL (1,000 mL Intravenous New Bag 11/12/23 1554)           NIH Stroke Scale:       Isolation/Infection(s):  No active isolations   No active infections     COVID Testing  Collected .  Resulted .    Nursing report ED to floor:  Mental status: A & O x1  Ambulatory status: Assist x1  Precautions: .    ED nurse phone extentsion- ..      Electronically signed by Zoe Yadav RN at 11/12/23 1626       Inocente Rudd MD at 11/12/23 1330          Subjective   History of Present Illness  76-year-old male presents to the emergency department with complaint of abdominal pain, nausea and vomiting.  History of hypertension, hld, asthma and anxiety, cognitive impairment.  History provided mainly by the sister who is present at the bedside.  Sister states that patient has had generalized weakness, nausea and vomiting since yesterday.  Has had several episodes of nonbloody nonbilious emesis.  Has been complaining of abdominal pain.  No reports of diarrhea.  Sister states that patient slept all day yesterday which is unusual.  Last night noticed he had some difficulty maintaining a normal balance while ambulating.  Last time he was able to walk normally was 2 days ago.  No reports of facial asymmetry, slurred speech, unilateral numbness or weakness.  Patient complains of abdominal pain and nausea.  Unable to obtain additional information.    History provided by:  Patient and relative      Review of Systems   All other systems reviewed and are negative.      Past Medical History:   Diagnosis Date    Anxiety     Hypertension        No Known Allergies    Past Surgical History:   Procedure Laterality Date    PROSTATE SURGERY         Family History   Problem  Relation Age of Onset    Diabetes Mother     Heart disease Mother     Kidney disease Mother     Cancer Father     Asthma Brother     Cancer Brother        Social History     Socioeconomic History    Marital status: Single   Tobacco Use    Smoking status: Never    Smokeless tobacco: Never   Substance and Sexual Activity    Alcohol use: Never    Drug use: Never    Sexual activity: Defer           Objective   Physical Exam  Vitals and nursing note reviewed.   Constitutional:       General: He is not in acute distress.     Appearance: He is normal weight.   HENT:      Head: Normocephalic and atraumatic.      Nose: Nose normal. No congestion or rhinorrhea.      Mouth/Throat:      Mouth: Mucous membranes are moist.      Pharynx: No oropharyngeal exudate or posterior oropharyngeal erythema.   Eyes:      Extraocular Movements: Extraocular movements intact.      Conjunctiva/sclera: Conjunctivae normal.      Pupils: Pupils are equal, round, and reactive to light.   Cardiovascular:      Rate and Rhythm: Normal rate and regular rhythm.      Heart sounds: Normal heart sounds. No murmur heard.  Pulmonary:      Effort: Pulmonary effort is normal.      Breath sounds: Normal breath sounds. No wheezing, rhonchi or rales.   Abdominal:      Tenderness: There is abdominal tenderness. There is guarding. There is no rebound.      Comments: Tenderness and voluntary guarding in the right upper and right lower quadrant   Musculoskeletal:         General: No swelling or tenderness.   Skin:     General: Skin is warm and dry.      Capillary Refill: Capillary refill takes less than 2 seconds.   Neurological:      General: No focal deficit present.      Mental Status: He is alert and oriented to person, place, and time. Mental status is at baseline.         Procedures      Lab Results (last 24 hours)       Procedure Component Value Units Date/Time    CBC & Differential [[id number]]  (Abnormal) Collected: 11/12/23 1326    Specimen: Blood Updated:  11/12/23 1339    Narrative:      The following orders were created for panel order CBC & Differential.  Procedure                               Abnormality         Status                     ---------                               -----------         ------                     CBC Auto Differential[[id number]]        Abnormal            Final result                 Please view results for these tests on the individual orders.    Comprehensive Metabolic Panel [[id number]]  (Abnormal) Collected: 11/12/23 1326    Specimen: Blood Updated: 11/12/23 1357     Glucose 161 mg/dL      BUN 45 mg/dL      Creatinine 2.78 mg/dL      Sodium 135 mmol/L      Potassium 3.2 mmol/L      Comment: Slight hemolysis detected by analyzer. Result may be falsely elevated.        Chloride 97 mmol/L      CO2 24.0 mmol/L      Calcium 9.2 mg/dL      Total Protein 7.4 g/dL      Albumin 3.7 g/dL      ALT (SGPT) 17 U/L      AST (SGOT) 23 U/L      Alkaline Phosphatase 72 U/L      Total Bilirubin 1.1 mg/dL      Globulin 3.7 gm/dL      A/G Ratio 1.0 g/dL      BUN/Creatinine Ratio 16.2     Anion Gap 14.0 mmol/L      eGFR 22.6 mL/min/1.73     Narrative:      GFR Normal >60  Chronic Kidney Disease <60  Kidney Failure <15    The GFR formula is only valid for adults with stable renal function between ages 18 and 70.    Protime-INR [[id number]]  (Abnormal) Collected: 11/12/23 1326    Specimen: Blood Updated: 11/12/23 1348     Protime 15.4 Seconds      INR 1.20    Lipase [[id number]]  (Normal) Collected: 11/12/23 1326    Specimen: Blood Updated: 11/12/23 1352     Lipase 24 U/L     Magnesium [[id number]]  (Normal) Collected: 11/12/23 1326    Specimen: Blood Updated: 11/12/23 1353     Magnesium 2.2 mg/dL     CBC Auto Differential [[id number]]  (Abnormal) Collected: 11/12/23 1326    Specimen: Blood Updated: 11/12/23 1339     WBC 21.49 10*3/mm3      RBC 4.53 10*6/mm3      Hemoglobin 14.4 g/dL      Hematocrit 42.9 %      MCV 94.7 fL      MCH 31.8 pg      MCHC 33.6 g/dL       RDW 13.0 %      RDW-SD 44.9 fl      MPV 10.2 fL      Platelets 197 10*3/mm3      Neutrophil % 86.9 %      Lymphocyte % 3.3 %      Monocyte % 8.9 %      Eosinophil % 0.0 %      Basophil % 0.2 %      Immature Grans % 0.7 %      Neutrophils, Absolute 18.65 10*3/mm3      Lymphocytes, Absolute 0.71 10*3/mm3      Monocytes, Absolute 1.92 10*3/mm3      Eosinophils, Absolute 0.01 10*3/mm3      Basophils, Absolute 0.04 10*3/mm3      Immature Grans, Absolute 0.16 10*3/mm3      nRBC 0.0 /100 WBC     Blood Culture - Blood, Arm, Left [[id number]] Collected: 11/12/23 1547    Specimen: Blood from Arm, Left Updated: 11/12/23 1558    Blood Culture - Blood, Arm, Right [[id number]] Collected: 11/12/23 1547    Specimen: Blood from Arm, Right Updated: 11/12/23 1559    Lactic Acid, Plasma [[id number]]  (Abnormal) Collected: 11/12/23 1547    Specimen: Blood Updated: 11/12/23 1613     Lactate 2.3 mmol/L          CT Abdomen Pelvis Without Contrast    Result Date: 11/12/2023  EXAM: CT ABDOMEN PELVIS WO CONTRAST-  INDICATION: abd pain, n/v, gfr less than 30    TECHNIQUE: Helically acquired CT images were obtained of the abdomen and pelvis without intravenous contrast. Coronal and sagittal reformations were performed.    DOSE LENGTH PRODUCT: 197 mGy cm. Automated exposure control was also utilized to decrease patient radiation dose.  COMPARISON: Non available.  FINDINGS:  Lower Chest: Trace pericardial fluid. Mild atelectasis in the right lung base  Liver: Unremarkable.  Biliary Tree: Gallbladder is distended with mild gallbladder wall thickening and pericholecystic stranding.  Spleen: Unremarkable.  Pancreas: Unremarkable.  Adrenal Glands: Unremarkable.  Kidneys/Ureters/Bladder: Unremarkable.  Reproductive Organs: Unremarkable.  Gastrointestinal Tract: Colonic diverticulosis. Stranding about the hepatic flexure of the large bowel, likely reactive. Appendix not visualized, however no secondary signs of acute appendicitis.  Lymphatics:  Unremarkable.  Vasculature: Mild to moderate atherosclerosis.  Peritoneum/Retroperitoneum: No additional findings  Abdominal Wall/Soft Tissues: Small fat-containing left inguinal hernia.  Osseous Structures: No acute or suspicious osseous findings.          Findings suspicious for acute cholecystitis demonstrated by distended gallbladder, mild gallbladder wall thickening, and pericholecystic stranding.   This report was signed and finalized on 11/12/2023 3:17 PM CST by Selvin Zambrano.      XR Chest 1 View    Result Date: 11/12/2023  XR CHEST 1 VW- 11/12/2023 1:28 PM CST  HISTORY: ams   COMPARISON: None  FINDINGS: Upright frontal radiograph of the chest was obtained  Airspace disease in the right lung base with obscuration of the right hemidiaphragm. The cardiomediastinal silhouette and pulmonary vascularity are within normal limits. The osseous structures and surrounding soft tissues demonstrate no acute abnormality.      1.  Right lower lobe airspace disease which obscures the right hemidiaphragm, differential including pneumonia, atelectasis or perhaps parenchymal scarring.  This report was signed and finalized on 11/12/2023 2:21 PM CST by Dr Rj Nuñez.        ED Course  ED Course as of 11/12/23 1613   Sun Nov 12, 2023   1603 76-year-old male with history of hypertension, hyperlipidemia, asthma, anxiety, felt mental delay who presents to the ED with 2-day history of abdominal pain, nausea, vomiting, decreased appetite, generalized weakness.  No fever in the ED.  White count 21,000 lipase normal, lactate pending..  Tender in the right upper quadrant right lower quadrant on exam.  CT ordered, reveals findings consistent with acute cholecystitis.  Received dose of Zosyn.  Chest x-ray ordered for preop purposes, revealed over past in the right lower lung field.  Likely atelectasis as this was seen on CT.  Doubt pneumonia as patient does not have any cough, congestion, shortness of breath hypoxia.    Blood cultures  and lactate sent.  Ordered 2 L of normal saline.  Zosyn ordered.    Also has flower, likely from dehydration. Will continue hydration.  Spoke to general surgery, Dr. Roman.  Given patient's comorbidities and acute kidney injury request admit to the hospital service.  Plan for cholecystectomy tomorrow.  Case discussed with hospitalist who have graciously excepted the patient for admission.   [AW]      ED Course User Index  [AW] Inocente Rudd MD                                           Medical Decision Making  Amount and/or Complexity of Data Reviewed  Labs: ordered.  Radiology: ordered.    Risk  Prescription drug management.        Final diagnoses:   Acute cholecystitis   Acute kidney injury       ED Disposition  ED Disposition       ED Disposition   Decision to Admit    Condition   --    Comment   Level of Care: Med/Surg [1]   Diagnosis: Acute cholecystitis [575.0.ICD-9-CM]   Admitting Physician: KITA FRAGA [[id number]]   Attending Physician: KITA FRAGA [[id number]]   Certification: I Certify That Inpatient Hospital Services Are Medically Necessary For Greater Than 2 Midnights                 No follow-up provider specified.       Medication List      No changes were made to your prescriptions during this visit.            Inocente Rudd MD  11/12/23 1613      Electronically signed by Inocente Rudd MD at 11/12/23 1613       Vital Signs (last day)       Date/Time Temp Temp src Pulse Resp BP Patient Position SpO2    11/13/23 1033 -- -- 53 16 -- -- 99    11/13/23 1024 -- -- 51 16 -- -- 97    11/13/23 0732 98 (36.7) Oral 52 16 137/63 Lying 95    11/13/23 0619 -- -- 95 16 -- -- 99    11/13/23 0613 -- -- 107 16 -- -- 95    11/13/23 0357 98.8 (37.1) Oral 98 16 130/63 Lying 94    11/13/23 0310 -- -- 102 16 -- -- 99    11/13/23 0303 -- -- 105 16 -- -- 96    11/12/23 2334 98.9 (37.2) Oral 108 16 112/59 Lying 93    11/12/23 2256 -- -- 102 16 -- -- 99    11/12/23 2249 -- -- 108 16 -- -- 94     11/12/23 1954 98 (36.7) Oral 94 16 114/58 Lying 100    11/12/23 1815 -- -- 104 16 -- -- 100    11/12/23 1809 -- -- 106 16 -- -- 99    11/12/23 1640 97.8 (36.6) Oral 109 20 129/78 Lying 100    11/12/23 1616 -- -- 101 -- 136/79 -- 93    11/12/23 1613 -- -- 98 -- -- -- 95    11/12/23 1559 -- -- 102 -- 123/65 -- 96    11/12/23 1512 -- -- 108 -- -- -- 98    11/12/23 1432 -- -- 108 -- 116/60 -- 92    11/12/23 1359 -- -- 90 -- 116/71 -- --    11/12/23 1339 -- -- -- -- -- -- 97    11/12/23 1259 -- -- 101 -- 109/58 -- 96    11/12/23 1247 98.3 (36.8) Oral 102 18 107/66 -- 96          Current Facility-Administered Medications   Medication Dose Route Frequency Provider Last Rate Last Admin    acetaminophen (TYLENOL) tablet 650 mg  650 mg Oral Q6H PRN Elizabeth Chapa MD        albuterol (PROVENTIL) nebulizer solution 0.042% 1.25 mg/3mL  1 ampule Nebulization Q4H - RT Saba León DO   1.25 mg at 11/13/23 1024    albuterol (PROVENTIL) nebulizer solution 0.083% 2.5 mg/3mL  2.5 mg Nebulization Q6H PRN Saba León DO        atorvastatin (LIPITOR) tablet 20 mg  20 mg Oral Daily Elizabeth Chapa MD        sennosides-docusate (PERICOLACE) 8.6-50 MG per tablet 2 tablet  2 tablet Oral BID Elizabeth Chapa MD   2 tablet at 11/12/23 2108    And    polyethylene glycol (MIRALAX) packet 17 g  17 g Oral Daily PRN Elizabeth Chapa MD        And    bisacodyl (DULCOLAX) EC tablet 5 mg  5 mg Oral Daily PRN Elizabeth Chapa MD        And    bisacodyl (DULCOLAX) suppository 10 mg  10 mg Rectal Daily PRN Elizabeth Chapa MD        budesonide-formoterol (SYMBICORT) 160-4.5 MCG/ACT inhaler 1 puff  1 puff Inhalation BID - RT Elizabeth Chapa MD   1 puff at 11/13/23 0613    cetirizine (zyrTEC) tablet 10 mg  10 mg Oral Daily Elizabeth Chapa MD        famotidine (PEPCID) injection 20 mg  20 mg Intravenous Daily Elizabeth Chapa MD   20 mg at 11/13/23 0812    indocyanine green (IC-GREEN) injection 5 mg  5 mg  Intravenous Once Carmelina Roman MD        morphine injection 4 mg  4 mg Intravenous Q4H PRN Elizabeth Chapa MD        Morphine sulfate (PF) injection 2 mg  2 mg Intravenous Q4H PRN Elizabeth Chapa MD        ondansetron (ZOFRAN) injection 4 mg  4 mg Intravenous Q6H PRN Elizabeth Chapa MD        Pharmacy to Dose Zosyn   Does not apply Q8H Elizabeth Chapa MD        piperacillin-tazobactam (ZOSYN) 3.375 g in iso-osmotic dextrose 50 ml (premix)  3.375 g Intravenous Q8H Elizabeth Chapa MD   3.375 g at 23 0632    sodium chloride 0.9 % flush 10 mL  10 mL Intravenous PRN Inocente Rudd MD        sodium chloride 0.9 % flush 10 mL  10 mL Intravenous Q12H Elizabeth Chapa MD        sodium chloride 0.9 % flush 10 mL  10 mL Intravenous PRN Elizabeth Chapa MD        sodium chloride 0.9 % infusion 40 mL  40 mL Intravenous PRN Elizabeth Chapa MD        sodium chloride 0.9 % infusion  100 mL/hr Intravenous Continuous Elizabeth Chapa  mL/hr at 23 0420 100 mL/hr at 23 0420    tamsulosin (FLOMAX) 24 hr capsule 0.4 mg  0.4 mg Oral Daily Elizabeth Chapa MD         Physician Progress Notes (last 24 hours)  Notes from 23 1104 through 23 1104   No notes of this type exist for this encounter.          Consult Notes (last 24 hours)        Carmelina Roman MD at 23 0910              [facility] General Surgery Services - Consult Note    Patient Name: Abhishek Mendieta  : 1946  MRN: [id number]  Primary Care Physician:  Matt Garcia DO  Referring Physician: nIocente Rudd MD  Date of admission: 2023    Consults    Subjective      Reason for Consult/ Chief Complaint: abdominal pain    History of Present Illness: Abhishek Mendieta is a 76 y.o. male who presents with two day history of abdominal pain. Reports RUQ and epigastric pain, moderate intensity, non-radiating. Associated nausea/vomiting. No chest pains/SOB.     ROS  abdominal pain    Personal History     Past Medical History:   Diagnosis Date    Anxiety     Hypertension        Past Surgical History:   Procedure Laterality Date    PROSTATE SURGERY         Family History: family history includes Asthma in his brother; Cancer in his brother and father; Diabetes in his mother; Heart disease in his mother; Kidney disease in his mother. Otherwise pertinent FHx was reviewed and not pertinent to current issue.    Social History:  reports that he has never smoked. He has never used smokeless tobacco. He reports that he does not drink alcohol and does not use drugs.    Home Medications:   albuterol, albuterol sulfate HFA, aspirin, benzonatate, budesonide, cetirizine, cholecalciferol, fluticasone-salmeterol, guaiFENesin, hydroCHLOROthiazide, ipratropium-albuterol, losartan, montelukast, pantoprazole, simvastatin, tamsulosin, tiotropium bromide monohydrate, and vitamin C    Allergies:  No Known Allergies      Objective      Vitals:  Temp:  [97.8 °F (36.6 °C)-98.9 °F (37.2 °C)] 98 °F (36.7 °C)  Heart Rate:  [] 52  Resp:  [16-20] 16  BP: (107-137)/(58-79) 137/63    Physical Exam  Constitutional:       General: He is not in acute distress.     Appearance: Normal appearance.   HENT:      Head: Normocephalic and atraumatic.   Eyes:      Extraocular Movements: Extraocular movements intact.      Pupils: Pupils are equal, round, and reactive to light.   Cardiovascular:      Rate and Rhythm: Normal rate and regular rhythm.      Pulses: Normal pulses.   Pulmonary:      Effort: Pulmonary effort is normal. No respiratory distress.      Breath sounds: Normal breath sounds.   Abdominal:      General: Bowel sounds are normal. There is no distension.      Palpations: Abdomen is soft. RUQ TTP   Neurological:      Mental Status: He is alert.   Psychiatric:         Mood and Affect: Mood normal.         Behavior: Behavior normal.     Result Review    Result Review:  I have personally reviewed the  results from the time of this admission to 11/13/2023 09:10 CST and agree with these findings:  [x]  Laboratory  []  Microbiology  [x]  Radiology  []  EKG/Telemetry   []  Cardiology/Vascular   []  Pathology  []  Old records  []  Other:        Assessment & Plan        Active Hospital Problems:  Active Hospital Problems    Diagnosis     **Acute cholecystitis      Plan:   To OR for lap cholecystectomy  NPO  IVF  IV antibiotics      Carmelina Roman MD  General Surgery  11/13/23  09:10 CST       Electronically signed by Carmelina Roman MD at 11/13/23 0911

## 2025-01-17 ENCOUNTER — CLINICAL SUPPORT (OUTPATIENT)
Dept: INTERNAL MEDICINE | Facility: CLINIC | Age: 55
End: 2025-01-17
Payer: COMMERCIAL

## 2025-01-17 ENCOUNTER — TELEPHONE (OUTPATIENT)
Dept: INTERNAL MEDICINE | Facility: CLINIC | Age: 55
End: 2025-01-17

## 2025-01-17 DIAGNOSIS — E11.65 TYPE 2 DIABETES MELLITUS WITH HYPERGLYCEMIA, WITHOUT LONG-TERM CURRENT USE OF INSULIN: ICD-10-CM

## 2025-01-17 DIAGNOSIS — I10 ESSENTIAL HYPERTENSION: Chronic | ICD-10-CM

## 2025-01-17 DIAGNOSIS — N89.8 VAGINAL DISCHARGE: Primary | ICD-10-CM

## 2025-01-17 LAB
CANDIDA SPECIES: NEGATIVE
GARDNERELLA VAGINALIS: NEGATIVE
T VAGINALIS DNA VAG QL PROBE+SIG AMP: NEGATIVE

## 2025-01-17 PROCEDURE — 87660 TRICHOMONAS VAGIN DIR PROBE: CPT | Performed by: NURSE PRACTITIONER

## 2025-01-17 PROCEDURE — 87510 GARDNER VAG DNA DIR PROBE: CPT | Performed by: NURSE PRACTITIONER

## 2025-01-17 PROCEDURE — 87480 CANDIDA DNA DIR PROBE: CPT | Performed by: NURSE PRACTITIONER

## 2025-01-17 RX ORDER — LOSARTAN POTASSIUM AND HYDROCHLOROTHIAZIDE 12.5; 5 MG/1; MG/1
1 TABLET ORAL DAILY
Qty: 90 TABLET | Refills: 1 | Status: SHIPPED | OUTPATIENT
Start: 2025-01-17

## 2025-01-17 NOTE — TELEPHONE ENCOUNTER
Caller: Holly Marc call back number:  [phone]  Requested Prescriptions:      losartan-hydrochlorothiazide (HYZAAR) 50-12.5 MG per tablet      Pharmacy where request should be sent:    [facility]    Additional details provided by patient:  Does the patient have less than a 3 day supply:  [ X] Yes  [ ] No  Would you like a call back once the refill request has been completed: [ X] Yes [ ] No  If the office needs to give you a call back, can they leave a voicemail: [ X] Yes [ ] No

## 2025-01-17 NOTE — TELEPHONE ENCOUNTER
Spoke with patient. Verified .   Made aware I sent the medication.  While on the phone patient also requested her Metformin and I sent this for her as well

## 2025-01-29 ENCOUNTER — TELEPHONE (OUTPATIENT)
Dept: INTERNAL MEDICINE | Facility: CLINIC | Age: 55
End: 2025-01-29
Payer: COMMERCIAL

## 2025-01-29 DIAGNOSIS — E11.65 TYPE 2 DIABETES MELLITUS WITH HYPERGLYCEMIA, WITHOUT LONG-TERM CURRENT USE OF INSULIN: Primary | ICD-10-CM

## 2025-01-29 NOTE — TELEPHONE ENCOUNTER
Caller: Holly Apple    Relationship: Self    Best call back number: [phone]     Requested Prescriptions:   MOUNJARO 0. 5ML       Pharmacy where request should be sent: [id number] - [place], [place] - [address] AT [facility] [phone] [facility] [phone] [facility]     Last office visit with prescribing clinician: 12/5/2024   Last telemedicine visit with prescribing clinician: Visit date not found   Next office visit with prescribing clinician: 2/18/2025     Additional details provided by patient: CALLER STATED THAT HER NEXT INJECTION IS TO BE DONE ON 2/5/25 AND SHE WILL BE OUT OF MEDICATION AFTER TODAY.     Does the patient have less than a 3 day supply:  [] Yes  [x] No    Jewell Vargas Rep   01/29/25 15:14 EST

## 2025-02-21 ENCOUNTER — OFFICE VISIT (OUTPATIENT)
Dept: INTERNAL MEDICINE | Facility: CLINIC | Age: 55
End: 2025-02-21
Payer: COMMERCIAL

## 2025-02-21 VITALS
WEIGHT: 190 LBS | TEMPERATURE: 97.3 F | BODY MASS INDEX: 32.44 KG/M2 | SYSTOLIC BLOOD PRESSURE: 140 MMHG | HEART RATE: 104 BPM | OXYGEN SATURATION: 99 % | DIASTOLIC BLOOD PRESSURE: 84 MMHG | HEIGHT: 64 IN

## 2025-02-21 DIAGNOSIS — G89.29 CHRONIC PAIN OF LEFT KNEE: Primary | Chronic | ICD-10-CM

## 2025-02-21 DIAGNOSIS — M25.562 CHRONIC PAIN OF LEFT KNEE: Primary | Chronic | ICD-10-CM

## 2025-02-21 DIAGNOSIS — M79.671 RIGHT FOOT PAIN: Chronic | ICD-10-CM

## 2025-02-21 PROCEDURE — 3079F DIAST BP 80-89 MM HG: CPT | Performed by: NURSE PRACTITIONER

## 2025-02-21 PROCEDURE — 3077F SYST BP >= 140 MM HG: CPT | Performed by: NURSE PRACTITIONER

## 2025-02-21 PROCEDURE — 99214 OFFICE O/P EST MOD 30 MIN: CPT | Performed by: NURSE PRACTITIONER

## 2025-02-21 PROCEDURE — 1159F MED LIST DOCD IN RCRD: CPT | Performed by: NURSE PRACTITIONER

## 2025-02-21 PROCEDURE — 1160F RVW MEDS BY RX/DR IN RCRD: CPT | Performed by: NURSE PRACTITIONER

## 2025-02-21 RX ORDER — MINOXIDIL 10 MG/1
10 TABLET ORAL DAILY
Qty: 30 TABLET | Refills: 2 | OUTPATIENT
Start: 2025-02-21

## 2025-02-21 NOTE — PROGRESS NOTES
Chief Complaint  Knee Pain (Left knee, patient has a lot of pain. Patient has been to physical therapy -- she feels it only made her worse, to the point she could not walk. Patient has had issues with her knee for years, she is just realizing how bad it is. )    Subjective          Holly Apple presents to [facility]  Knee Pain   The pain is present in the right knee. Pertinent negatives include no inability to bear weight, loss of motion, loss of sensation, muscle weakness, numbness or tingling. The symptoms are aggravated by weight bearing, movement and palpation. She has tried non-weight bearing, rest, NSAIDs, acetaminophen, ice, elevation and heat (PT) for the symptoms.   Foot Injury   The incident occurred more than 1 week ago. The incident occurred at home. The pain is present in the right foot. Pertinent negatives include no inability to bear weight, loss of motion, loss of sensation, muscle weakness, numbness or tingling. The symptoms are aggravated by movement, palpation and weight bearing. The treatment provided no relief.     [facility]ent to [facility]     History of Present Illness      Current Outpatient Medications   Medication Instructions    azelastine (ASTELIN) 0.1 % nasal spray 2 sprays, Nasal, 2 Times Daily, Use in each nostril as directed    Blood Glucose Monitoring Suppl (FreeStyle Lite) w/Device kit     Collagen-Vitamin C-Biotin (COLLAGEN PO) Daily    CRANBERRY PO Take  by mouth.    Diclofenac Sodium (VOLTAREN) 4 g, Topical, 4 Times Daily PRN    EPINEPHrine (EPIPEN) 0.3 mg, Intramuscular, Once As Needed    fexofenadine (ALLEGRA) 180 mg, Nightly    glucose blood test strip Daily in the AM, fasting    glucose monitor monitoring kit 1 each, Not Applicable, Daily    Lancets (accu-chek multiclix) lancets Use one daily in the AM, fasting    losartan-hydrochlorothiazide (HYZAAR) 50-12.5 MG per tablet 1 tablet, Oral, Daily    metFORMIN (GLUCOPHAGE) 500 mg, Oral,  "2 Times Daily With Meals    multivitamin (MULTIVITAMIN PO) Daily    Omega-3 Fatty Acids (FISH OIL PO) Daily    Probiotic Product (PROBIOTIC PO) 1 each, Daily    rosuvastatin (CRESTOR) 5 mg, Oral, Daily    Tirzepatide 5 mg, Subcutaneous, Every 7 Days       The following portions of the patient's history were reviewed and updated as appropriate: allergies, current medications, past family history, past medical history, past social history, past surgical history, and problem list.    Objective   Vital Signs:   /84 (BP Location: Right arm, Patient Position: Sitting, Cuff Size: Adult)   Pulse 104   Temp 97.3 °F (36.3 °C) (Temporal)   Ht 162.6 cm (64\")   Wt 86.2 kg (190 lb)   SpO2 99%   BMI 32.61 kg/m²     BP Readings from Last 3 Encounters:   02/21/25 140/84   01/27/25 135/81   12/05/24 104/72     Wt Readings from Last 3 Encounters:   02/21/25 86.2 kg (190 lb)   01/27/25 86.2 kg (190 lb 1.6 oz)   12/05/24 83.5 kg (184 lb)           Physical Exam  Musculoskeletal:      Left knee: Tenderness present.      Right foot: Tenderness present.          Appearance: No acute distress, well-nourished  Head: normocephalic, atraumatic  Eyes: extraocular movements intact, no scleral icterus, no conjunctival injection  Ears, Nose, and Throat: external ears normal, nares patent, moist mucous membranes  Cardiovascular: regular rate and rhythm. no murmurs, rubs, or gallops. no edema  Respiratory: breathing comfortably, symmetric chest rise, clear to auscultation bilaterally. No wheezes, rales, or rhonchi.  Neuro: alert and oriented to time, place, and person. Normal gait  Psych: normal mood and affect     Physical Exam        Result Review :   The following data was reviewed by: KASSANDRA Wilder on 02/18/2025:  Common labs          6/5/2024    12:57 9/5/2024    12:39 12/5/2024    12:37   Common Labs   Glucose 82  89  90    BUN 15  17  20    Creatinine 0.98  0.96  1.11    Sodium 138  139  136    Potassium 4.1  3.7  3.7  "   Chloride 96  100  97    Calcium 10.0  10.0  9.6    Albumin 4.5  4.6  4.3    Total Bilirubin 0.4  0.4  0.4    Alkaline Phosphatase 70  71  66    AST (SGOT) 20  21  25    ALT (SGPT) 22  25  23    WBC 6.57  6.87  6.88    Hemoglobin 13.9  13.6  14.3    Hematocrit 44.1  42.5  43.1    Platelets 289  278  326    Total Cholesterol 149  165  179    Triglycerides 135  182  289    HDL Cholesterol 57  53  49    LDL Cholesterol  69  81  83    Hemoglobin A1C 5.80  6.00  5.90        Results           Lab Results   Component Value Date    SARSANTIGEN Not Detected 09/27/2023    COVID19 Not Detected 09/27/2023    RAPFLUA Negative 05/09/2022    RAPFLUB Negative 05/09/2022    FLUAAG Not Detected 09/27/2023    FLUBAG Not Detected 09/27/2023    RAPSCRN Negative 04/12/2023    INR 1.0 02/27/2023    BILIRUBINUR Negative 09/16/2024            Assessment and Plan    Diagnoses and all orders for this visit:    1. Chronic pain of left knee (Primary)  -     Ambulatory Referral to Orthopedic Surgery  -     Diclofenac Sodium (VOLTAREN) 1 % gel gel; Apply 4 g topically to the appropriate area as directed 4 (Four) Times a Day As Needed (pain).  Dispense: 350 g; Refill: 0    2. Right foot pain  -     Ambulatory Referral to Podiatry        Assessment & Plan      There are no discontinued medications.       Follow Up   Return if symptoms worsen or fail to improve.  Patient was given instructions and counseling regarding her condition or for health maintenance advice. Please see specific information pulled into the AVS if appropriate.       KASSANDRA Wilder  02/21/25  11:33 EST      Patient or patient representative verbalized consent for the use of Ambient Listening during the visit with  KASSANDRA Wilder for chart documentation. 2/21/2025  11:32 EST

## 2025-02-27 ENCOUNTER — OFFICE VISIT (OUTPATIENT)
Dept: ORTHOPEDIC SURGERY | Facility: CLINIC | Age: 55
End: 2025-02-27
Payer: COMMERCIAL

## 2025-02-27 VITALS
BODY MASS INDEX: 31.07 KG/M2 | HEIGHT: 64 IN | DIASTOLIC BLOOD PRESSURE: 92 MMHG | HEART RATE: 92 BPM | SYSTOLIC BLOOD PRESSURE: 142 MMHG | WEIGHT: 182 LBS | OXYGEN SATURATION: 96 %

## 2025-02-27 DIAGNOSIS — M25.562 LEFT KNEE PAIN, UNSPECIFIED CHRONICITY: Primary | ICD-10-CM

## 2025-02-27 DIAGNOSIS — M17.12 PRIMARY OSTEOARTHRITIS OF LEFT KNEE: ICD-10-CM

## 2025-02-27 DIAGNOSIS — M23.92 INTERNAL DERANGEMENT OF LEFT KNEE: ICD-10-CM

## 2025-02-27 NOTE — PROGRESS NOTES
Chief Complaint  Initial Evaluation of the Left Knee       Subjective      Holly Apple presents to [facility] for an evaluation  of her left knee. Her left knee has been bothering her for several years but has gotten worse. She locates her pain to the patella region. She denies any specific injury, trauma, falls or prior surgery on her left knee. She has been to physical therapy and states this didn't help and made her pain worse. She then had pain with prolonged walking and going up and down stairs. She feels like her knee is going to give out. She has popping to her knee.     Allergies   Allergen Reactions    Diethyl Toluamide (Deet) Anaphylaxis    Fluconazole Swelling and Unknown - High Severity    Measles, Mumps & Rubella Vac Unknown - High Severity     Vaccine    Wasp Venom Anaphylaxis    Egg-Derived Products GI Intolerance    Ibuprofen GI Intolerance    Measles Virus Live Vaccine Other (See Comments)     DEVELOPED FEVER AS A CHILD    Peanut-Containing Drug Products Unknown - High Severity     STATES CAUSES THROAT TINGLING    Ciprofloxacin Rash        Social History     Socioeconomic History    Marital status:    Tobacco Use    Smoking status: Former     Current packs/day: 0.00     Average packs/day: 1 pack/day for 20.0 years (20.0 ttl pk-yrs)     Types: Cigarettes     Start date:      Quit date:      Years since quittin.1     Passive exposure: Past    Smokeless tobacco: Never    Tobacco comments:     QUIT APPROX 16 YRS   Vaping Use    Vaping status: Never Used   Substance and Sexual Activity    Alcohol use: Never    Drug use: Never    Sexual activity: Defer     Birth control/protection: Hysterectomy        I reviewed the patient's chief complaint, history of present illness, review of systems, past medical history, surgical history, family history, social history, medications, and allergy list.     Review of Systems     Constitutional: Denies fevers,  "chills, weight loss  Cardiovascular: Denies chest pain, shortness of breath  Skin: Denies rashes, acute skin changes  Neurologic: Denies headache, loss of consciousness  MSK: left knee pain       Vital Signs:   /92   Pulse 92   Ht 162.6 cm (64.02\")   Wt 82.6 kg (182 lb)   SpO2 96%   BMI 31.22 kg/m²            Ortho Exam    Physical Exam  General:Alert. No acute distress     Left lower extremity: full extension, flexion  to 130 degrees, mild crepitus with range of motion, stable to anterior/posterior drawer ,  stable to varus/valgus stress, negative  Lachman's, pain with Stone's, pain with patella compression, distal neurovascularly intact, positive  pulses, calf soft, positive EHL, FHL, GS, and TA. Sensation intact to all 5 nerves of the foot.     Procedures    X-Ray Report:  Left knee X-Ray  Indication: Evaluation of left knee pain   AP/Lateral and Standing view(s)  Findings: moderate degenerative changes to the knee, small osteophyte to the tibial spines and superior patella, no acute fracture or effusion.    Prior studies available for comparison: no       Imaging Results (Most Recent)       Procedure Component Value Units Date/Time    XR Knee 3 View Left [[id number]] Resulted: 02/27/25 1606     Updated: 02/27/25 1608             Result Review :       No results found.           Assessment and Plan     Diagnoses and all orders for this visit:    1. Left knee pain, unspecified chronicity (Primary)  -     XR Knee 3 View Left    2. Primary osteoarthritis of left knee    3. Internal derangement of left knee      The patient presents here today for an evaluation  of her left knee . X-rays were obtained in the office today and these were reviewed today.     Discussed the risks and benefits of a left knee steroid injection today in the office. Patient expressed understanding and wishes to hold off at this time as she would like to have an MRI done prior.     MRI order placed today on her left knee to " evaluate for internal derangement.     Home exercises given today and will continue over the counter medications for pain control.     Call or return if worsening symptoms.    Follow Up     After MRI     Patient was given instructions and counseling regarding her condition or for health maintenance advice. Please see specific information pulled into the AVS if appropriate.     Scribed for Jamison Noyola MD by Corie Barboza.  02/27/25   16:15 EST    I have personally performed the services described in this document as scribed by the above individual and it is both accurate and complete. Jamison Noyola MD 02/28/25

## 2025-03-03 NOTE — PROGRESS NOTES
Chief Complaint  Hypertension, Hyperlipidemia, and Diabetes    Subjective          Holly Apple presents to [facility]  History of Present Illness    History of Present Illness    Hyperlipidemia  This is a chronic problem. Exacerbating diseases include diabetes and obesity. She has no history of chronic renal disease, hypothyroidism, liver disease or nephrotic syndrome. Pertinent negatives include no chest pain, focal sensory loss, focal weakness, leg pain, myalgias or shortness of breath.   Hypertension  This is a chronic problem. Associated symptoms include anxiety. Pertinent negatives include no blurred vision, chest pain, headaches, malaise/fatigue, neck pain, orthopnea, palpitations, peripheral edema, PND, shortness of breath or sweats. Compliance problems include diet and exercise.  There is no history of angina, kidney disease, CAD/MI, CVA, heart failure, left ventricular hypertrophy or PVD. There is no history of chronic renal disease.   Diabetes  She presents for her initial diabetic visit. She has type 2 diabetes mellitus. Hypoglycemia symptoms include nervousness/anxiousness. Pertinent negatives for hypoglycemia include no confusion, dizziness, headaches, hunger, mood changes, pallor, seizures, sleepiness, speech difficulty, sweats or tremors. There are no diabetic associated symptoms. Pertinent negatives for diabetes include no blurred vision, no chest pain, no fatigue, no foot paresthesias, no foot ulcerations, no polydipsia, no polyphagia, no polyuria, no visual change, no weakness and no weight loss. There are no hypoglycemic complications. Symptoms are stable. There are no diabetic complications. Pertinent negatives for diabetic complications include no CVA, impotence or PVD. Risk factors for coronary artery disease include dyslipidemia, family history, obesity, hypertension, stress and post-menopausal.   Anxiety  Presents for follow-up visit.  Symptoms include decreased concentration, depressed mood, irritability and nervous/anxious behavior. Patient reports no chest pain, compulsions, confusion, dizziness, dry mouth, excessive worry, feeling of choking, hyperventilation, impotence, insomnia, malaise, muscle tension, nausea, obsessions, palpitations, panic, restlessness, shortness of breath or suicidal ideas.       Obesity  This is a chronic problem. Pertinent negatives include no abdominal pain, anorexia, arthralgias, change in bowel habit, chest pain, chills, congestion, coughing, diaphoresis, fatigue, fever, headaches, joint swelling, myalgias, nausea, neck pain, numbness, rash, sore throat, swollen glands, urinary symptoms, vertigo, visual change, vomiting or weakness.     Saw Dr Noyola for left knee pain. Ordered MRI.  Office Visit with Jamison Noyola MD (02/27/2025)   XR CHELSEY KNEE 3 VW L (02/27/2025)   Seeing Cardiology and Podiatry in April.         Current Outpatient Medications   Medication Instructions    Blood Glucose Monitoring Suppl (FreeStyle Lite) w/Device kit     carboxymethylcellulose sod 1 % gel eye gel 2 drops, Both Eyes, 3 Times Daily PRN    Collagen-Vitamin C-Biotin (COLLAGEN PO) Daily    CRANBERRY PO Take  by mouth.    Diclofenac Sodium (VOLTAREN) 4 g, Topical, 4 Times Daily PRN    EPINEPHrine (EPIPEN) 0.3 mg, Intramuscular, Once As Needed    fexofenadine (ALLEGRA) 180 mg, Nightly    glucose blood test strip Daily in the AM, fasting    glucose monitor monitoring kit 1 each, Not Applicable, Daily    Lancets (accu-chek multiclix) lancets Use one daily in the AM, fasting    losartan-hydrochlorothiazide (HYZAAR) 50-12.5 MG per tablet 1 tablet, Oral, Daily    metFORMIN (GLUCOPHAGE) 500 mg, Oral, 2 Times Daily With Meals    minoxidil (LONITEN) 10 mg, Oral, Daily    multivitamin (MULTIVITAMIN PO) Daily    Omega-3 Fatty Acids (FISH OIL PO) Daily    Probiotic Product (PROBIOTIC PO) 1 each, Daily    rosuvastatin (CRESTOR) 5 mg, Oral,  "Daily    Tirzepatide 5 mg, Subcutaneous, Every 7 Days    trimethoprim-polymyxin b (Polytrim) 08509-4.1 UNIT/ML-% ophthalmic solution 1 drop, Both Eyes, Every 4 Hours       The following portions of the patient's history were reviewed and updated as appropriate: allergies, current medications, past family history, past medical history, past social history, past surgical history, and problem list.    Objective   Vital Signs:   /84   Pulse 83   Temp 97.3 °F (36.3 °C)   Ht 162.6 cm (64\")   Wt 85.3 kg (188 lb)   SpO2 96%   BMI 32.27 kg/m²     BP Readings from Last 3 Encounters:   03/12/25 127/77   03/06/25 127/84   02/27/25 142/92     Wt Readings from Last 3 Encounters:   03/12/25 84.8 kg (187 lb)   03/06/25 85.3 kg (188 lb)   02/27/25 82.6 kg (182 lb)           Physical Exam     Appearance: No acute distress, well-nourished  Head: normocephalic, atraumatic  Eyes: extraocular movements intact, no scleral icterus, no conjunctival injection  Ears, Nose, and Throat: external ears normal, nares patent, moist mucous membranes  Cardiovascular: regular rate and rhythm. no murmurs, rubs, or gallops. no edema  Respiratory: breathing comfortably, symmetric chest rise, clear to auscultation bilaterally. No wheezes, rales, or rhonchi.  Neuro: alert and oriented to time, place, and person. Normal gait  Psych: normal mood and affect     Physical Exam        Result Review :   The following data was reviewed by: KASSANDRA Wilder on 03/06/2025:  Common labs          9/5/2024    12:39 12/5/2024    12:37 3/6/2025    13:01   Common Labs   Glucose 89  90  88    BUN 17  20  15    Creatinine 0.96  1.11  1.00    Sodium 139  136  141    Potassium 3.7  3.7  4.6    Chloride 100  97  99    Calcium 10.0  9.6  10.2    Albumin 4.6  4.3  4.0    Total Bilirubin 0.4  0.4  0.5    Alkaline Phosphatase 71  66  72    AST (SGOT) 21  25  27    ALT (SGPT) 25  23  28    WBC 6.87  6.88  6.63    Hemoglobin 13.6  14.3  14.1    Hematocrit 42.5  " 43.1  42.8    Platelets 278  326  292    Total Cholesterol 165  179  149    Triglycerides 182  289  107    HDL Cholesterol 53  49  59    LDL Cholesterol  81  83  71    Hemoglobin A1C 6.00  5.90  6.10        Results           Lab Results   Component Value Date    SARSANTIGEN Not Detected 09/27/2023    COVID19 Not Detected 09/27/2023    RAPFLUA Negative 05/09/2022    RAPFLUB Negative 05/09/2022    FLUAAG Not Detected 09/27/2023    FLUBAG Not Detected 09/27/2023    RAPSCRN Negative 04/12/2023    INR 1.0 02/27/2023    BILIRUBINUR Negative 09/16/2024            Assessment and Plan    Diagnoses and all orders for this visit:    1. Type 2 diabetes mellitus with hyperglycemia, without long-term current use of insulin (Primary)  Assessment & Plan:  Diabetes is  will rehceck A1C today  .   Continue current treatment regimen.  Recommended an ADA diet.  Recommended a Mediterranean style of eating  Regular aerobic exercise.  Discussed foot care.  Diabetes will be reassessed in 3 months    Orders:  -     Hemoglobin A1c  -     Microalbumin / Creatinine Urine Ratio - Urine, Clean Catch  -     Tirzepatide 5 MG/0.5ML solution auto-injector; Inject 5 mg under the skin into the appropriate area as directed Every 7 (Seven) Days.  Dispense: 2 mL; Refill: 2    2. Essential hypertension  Assessment & Plan:  Hypertension is stable and controlled  Continue current treatment regimen.  Dietary sodium restriction.  Weight loss.  Regular aerobic exercise.  Ambulatory blood pressure monitoring.  Blood pressure will be reassessed in 3 months.    Orders:  -     CBC w AUTO Differential  -     Comprehensive metabolic panel  -     Lipid panel    3. Mixed hyperlipidemia  Assessment & Plan:      Lipid abnormalities : will recheck today      Plan:  Continue same medication/s without change.       Discussed medication dosage, use, side effects, and goals of treatment in detail.    Counseled patient on lifestyle modifications to help control  hyperlipidemia.   Advised patient to exercise for 150 minutes weekly. (30 minute brisk walk, 5 days a week for example)  Weight Loss encouraged     Patient Treatment Goals:   LDL goal is less than 70     Followup in 3 months.       Orders:  -     CBC w AUTO Differential  -     Comprehensive metabolic panel  -     Lipid panel    4. Excessive daytime sleepiness  Comments:  referral to sleep medicine  Orders:  -     Ambulatory Referral to Sleep Medicine    5. Class 1 obesity with serious comorbidity and body mass index (BMI) of 32.0 to 32.9 in adult, unspecified obesity type  -     Ambulatory Referral to Sleep Medicine    6. Snoring  Comments:  referral to sleep medicine  Orders:  -     Ambulatory Referral to Sleep Medicine        Assessment & Plan      Medications Discontinued During This Encounter   Medication Reason    azelastine (ASTELIN) 0.1 % nasal spray Historical Med - Therapy completed    Tirzepatide 5 MG/0.5ML solution auto-injector Reorder          Follow Up   Return in about 3 months (around 6/6/2025) for Hypertension, Diabetes, Hyperlipidemia.  Patient was given instructions and counseling regarding her condition or for health maintenance advice. Please see specific information pulled into the AVS if appropriate.       KASSANDRA Wilder  03/14/25  08:00 EDT      Patient or patient representative verbalized consent for the use of Ambient Listening during the visit with  KASSANDRA Wilder for chart documentation. 3/14/2025  12:35 EST

## 2025-03-06 ENCOUNTER — OFFICE VISIT (OUTPATIENT)
Dept: INTERNAL MEDICINE | Facility: CLINIC | Age: 55
End: 2025-03-06
Payer: COMMERCIAL

## 2025-03-06 VITALS
DIASTOLIC BLOOD PRESSURE: 84 MMHG | HEIGHT: 64 IN | HEART RATE: 83 BPM | SYSTOLIC BLOOD PRESSURE: 127 MMHG | OXYGEN SATURATION: 96 % | BODY MASS INDEX: 32.1 KG/M2 | TEMPERATURE: 97.3 F | WEIGHT: 188 LBS

## 2025-03-06 DIAGNOSIS — G47.19 EXCESSIVE DAYTIME SLEEPINESS: ICD-10-CM

## 2025-03-06 DIAGNOSIS — E66.811 CLASS 1 OBESITY WITH SERIOUS COMORBIDITY AND BODY MASS INDEX (BMI) OF 32.0 TO 32.9 IN ADULT, UNSPECIFIED OBESITY TYPE: ICD-10-CM

## 2025-03-06 DIAGNOSIS — E78.2 MIXED HYPERLIPIDEMIA: ICD-10-CM

## 2025-03-06 DIAGNOSIS — R06.83 SNORING: ICD-10-CM

## 2025-03-06 DIAGNOSIS — I10 ESSENTIAL HYPERTENSION: ICD-10-CM

## 2025-03-06 DIAGNOSIS — E11.65 TYPE 2 DIABETES MELLITUS WITH HYPERGLYCEMIA, WITHOUT LONG-TERM CURRENT USE OF INSULIN: Primary | ICD-10-CM

## 2025-03-06 LAB
BASOPHILS # BLD AUTO: 0.03 10*3/MM3 (ref 0–0.2)
BASOPHILS NFR BLD AUTO: 0.5 % (ref 0–1.5)
DEPRECATED RDW RBC AUTO: 38.2 FL (ref 37–54)
EOSINOPHIL # BLD AUTO: 0.16 10*3/MM3 (ref 0–0.4)
EOSINOPHIL NFR BLD AUTO: 2.4 % (ref 0.3–6.2)
ERYTHROCYTE [DISTWIDTH] IN BLOOD BY AUTOMATED COUNT: 13.4 % (ref 12.3–15.4)
HBA1C MFR BLD: 6.1 % (ref 4.8–5.6)
HCT VFR BLD AUTO: 42.8 % (ref 34–46.6)
HGB BLD-MCNC: 14.1 G/DL (ref 12–15.9)
IMM GRANULOCYTES # BLD AUTO: 0.01 10*3/MM3 (ref 0–0.05)
IMM GRANULOCYTES NFR BLD AUTO: 0.2 % (ref 0–0.5)
LYMPHOCYTES # BLD AUTO: 2.84 10*3/MM3 (ref 0.7–3.1)
LYMPHOCYTES NFR BLD AUTO: 42.8 % (ref 19.6–45.3)
MCH RBC QN AUTO: 26 PG (ref 26.6–33)
MCHC RBC AUTO-ENTMCNC: 32.9 G/DL (ref 31.5–35.7)
MCV RBC AUTO: 78.8 FL (ref 79–97)
MONOCYTES # BLD AUTO: 0.41 10*3/MM3 (ref 0.1–0.9)
MONOCYTES NFR BLD AUTO: 6.2 % (ref 5–12)
NEUTROPHILS NFR BLD AUTO: 3.18 10*3/MM3 (ref 1.7–7)
NEUTROPHILS NFR BLD AUTO: 47.9 % (ref 42.7–76)
NRBC BLD AUTO-RTO: 0 /100 WBC (ref 0–0.2)
PLATELET # BLD AUTO: 292 10*3/MM3 (ref 140–450)
PMV BLD AUTO: 8.8 FL (ref 6–12)
RBC # BLD AUTO: 5.43 10*6/MM3 (ref 3.77–5.28)
WBC NRBC COR # BLD AUTO: 6.63 10*3/MM3 (ref 3.4–10.8)

## 2025-03-06 PROCEDURE — 85025 COMPLETE CBC W/AUTO DIFF WBC: CPT | Performed by: NURSE PRACTITIONER

## 2025-03-06 PROCEDURE — 3074F SYST BP LT 130 MM HG: CPT | Performed by: NURSE PRACTITIONER

## 2025-03-06 PROCEDURE — 83036 HEMOGLOBIN GLYCOSYLATED A1C: CPT | Performed by: NURSE PRACTITIONER

## 2025-03-06 PROCEDURE — 3044F HG A1C LEVEL LT 7.0%: CPT | Performed by: NURSE PRACTITIONER

## 2025-03-06 PROCEDURE — 3079F DIAST BP 80-89 MM HG: CPT | Performed by: NURSE PRACTITIONER

## 2025-03-06 PROCEDURE — 99214 OFFICE O/P EST MOD 30 MIN: CPT | Performed by: NURSE PRACTITIONER

## 2025-03-06 PROCEDURE — 80061 LIPID PANEL: CPT | Performed by: NURSE PRACTITIONER

## 2025-03-06 PROCEDURE — 80053 COMPREHEN METABOLIC PANEL: CPT | Performed by: NURSE PRACTITIONER

## 2025-03-06 NOTE — ASSESSMENT & PLAN NOTE
Patient's (Body mass index is 32.27 kg/m².) indicates that they are obese (BMI >30) with health conditions that include hypertension, diabetes mellitus, dyslipidemias, and osteoarthritis . Weight is improving with lifestyle modifications. BMI  is above average; BMI management plan is completed. We discussed low calorie, low carb based diet program, portion control, and increasing exercise.

## 2025-03-07 LAB
ALBUMIN SERPL-MCNC: 4 G/DL (ref 3.5–5.2)
ALBUMIN/GLOB SERPL: 1.1 G/DL
ALP SERPL-CCNC: 72 U/L (ref 39–117)
ALT SERPL W P-5'-P-CCNC: 28 U/L (ref 1–33)
ANION GAP SERPL CALCULATED.3IONS-SCNC: 13.3 MMOL/L (ref 5–15)
AST SERPL-CCNC: 27 U/L (ref 1–32)
BILIRUB SERPL-MCNC: 0.5 MG/DL (ref 0–1.2)
BUN SERPL-MCNC: 15 MG/DL (ref 6–20)
BUN/CREAT SERPL: 15 (ref 7–25)
CALCIUM SPEC-SCNC: 10.2 MG/DL (ref 8.6–10.5)
CHLORIDE SERPL-SCNC: 99 MMOL/L (ref 98–107)
CHOLEST SERPL-MCNC: 149 MG/DL (ref 0–200)
CO2 SERPL-SCNC: 28.7 MMOL/L (ref 22–29)
CREAT SERPL-MCNC: 1 MG/DL (ref 0.57–1)
EGFRCR SERPLBLD CKD-EPI 2021: 66.3 ML/MIN/1.73
GLOBULIN UR ELPH-MCNC: 3.7 GM/DL
GLUCOSE SERPL-MCNC: 88 MG/DL (ref 65–99)
HDLC SERPL-MCNC: 59 MG/DL (ref 40–60)
LDLC SERPL CALC-MCNC: 71 MG/DL (ref 0–100)
LDLC/HDLC SERPL: 1.16 {RATIO}
POTASSIUM SERPL-SCNC: 4.6 MMOL/L (ref 3.5–5.2)
PROT SERPL-MCNC: 7.7 G/DL (ref 6–8.5)
SODIUM SERPL-SCNC: 141 MMOL/L (ref 136–145)
TRIGL SERPL-MCNC: 107 MG/DL (ref 0–150)
VLDLC SERPL-MCNC: 19 MG/DL (ref 5–40)

## 2025-03-12 ENCOUNTER — OFFICE VISIT (OUTPATIENT)
Dept: INTERNAL MEDICINE | Facility: CLINIC | Age: 55
End: 2025-03-12
Payer: COMMERCIAL

## 2025-03-12 VITALS
OXYGEN SATURATION: 95 % | HEART RATE: 99 BPM | WEIGHT: 187 LBS | BODY MASS INDEX: 31.92 KG/M2 | TEMPERATURE: 98 F | SYSTOLIC BLOOD PRESSURE: 127 MMHG | DIASTOLIC BLOOD PRESSURE: 77 MMHG | HEIGHT: 64 IN

## 2025-03-12 DIAGNOSIS — H10.9 CONJUNCTIVITIS OF BOTH EYES, UNSPECIFIED CONJUNCTIVITIS TYPE: ICD-10-CM

## 2025-03-12 DIAGNOSIS — H04.123 DRY EYES: Primary | ICD-10-CM

## 2025-03-12 PROCEDURE — 3078F DIAST BP <80 MM HG: CPT | Performed by: NURSE PRACTITIONER

## 2025-03-12 PROCEDURE — 3074F SYST BP LT 130 MM HG: CPT | Performed by: NURSE PRACTITIONER

## 2025-03-12 PROCEDURE — 3044F HG A1C LEVEL LT 7.0%: CPT | Performed by: NURSE PRACTITIONER

## 2025-03-12 PROCEDURE — 99213 OFFICE O/P EST LOW 20 MIN: CPT | Performed by: NURSE PRACTITIONER

## 2025-03-12 RX ORDER — POLYMYXIN B SULFATE AND TRIMETHOPRIM 1; 10000 MG/ML; [USP'U]/ML
1 SOLUTION OPHTHALMIC EVERY 4 HOURS
Qty: 10 ML | Refills: 0 | Status: SHIPPED | OUTPATIENT
Start: 2025-03-12

## 2025-03-12 RX ORDER — CARBOXYMETHYLCELLULOSE SODIUM 10 MG/ML
2 GEL OPHTHALMIC 3 TIMES DAILY PRN
Qty: 15 EACH | Refills: 0 | Status: SHIPPED | OUTPATIENT
Start: 2025-03-12

## 2025-03-12 NOTE — PROGRESS NOTES
Chief Complaint  Eye Pain (Swelling, Red, Dryness. Started 2 days ago. Patient was told by the eye doctor she has dry eyes and cataracts but she did not feel he was right. Another eye doctor told her she does not have cataracts but it could depend  on who you ask. )    Debbie Apple presents to [facility]  Conjunctivitis   Associated symptoms include eye itching, eye discharge and eye redness. Pertinent negatives include no decreased vision, no double vision, no photophobia, no congestion, no ear discharge, no ear pain, no headaches, no hearing loss, no mouth sores, no rhinorrhea, no sore throat, no stridor, no swollen glands and no eye pain.       History of Present Illness            Current Outpatient Medications   Medication Instructions    Blood Glucose Monitoring Suppl (FreeStyle Lite) w/Device kit     carboxymethylcellulose sod 1 % gel eye gel 2 drops, Both Eyes, 3 Times Daily PRN    Collagen-Vitamin C-Biotin (COLLAGEN PO) Daily    CRANBERRY PO Take  by mouth.    Diclofenac Sodium (VOLTAREN) 4 g, Topical, 4 Times Daily PRN    EPINEPHrine (EPIPEN) 0.3 mg, Intramuscular, Once As Needed    fexofenadine (ALLEGRA) 180 mg, Nightly    glucose blood test strip Daily in the AM, fasting    glucose monitor monitoring kit 1 each, Not Applicable, Daily    Lancets (accu-chek multiclix) lancets Use one daily in the AM, fasting    losartan-hydrochlorothiazide (HYZAAR) 50-12.5 MG per tablet 1 tablet, Oral, Daily    metFORMIN (GLUCOPHAGE) 500 mg, Oral, 2 Times Daily With Meals    minoxidil (LONITEN) 10 mg, Oral, Daily    multivitamin (MULTIVITAMIN PO) Daily    Omega-3 Fatty Acids (FISH OIL PO) Daily    Probiotic Product (PROBIOTIC PO) 1 each, Daily    rosuvastatin (CRESTOR) 5 mg, Oral, Daily    Tirzepatide 5 mg, Subcutaneous, Every 7 Days    trimethoprim-polymyxin b (Polytrim) 25742-8.1 UNIT/ML-% ophthalmic solution 1 drop, Both Eyes, Every 4 Hours       The  "following portions of the patient's history were reviewed and updated as appropriate: allergies, current medications, past family history, past medical history, past social history, past surgical history, and problem list.    Objective   Vital Signs:   /77 (BP Location: Left arm, Patient Position: Sitting, Cuff Size: Adult)   Pulse 99   Temp 98 °F (36.7 °C) (Temporal)   Ht 162.6 cm (64\")   Wt 84.8 kg (187 lb)   SpO2 95%   BMI 32.10 kg/m²     Wt Readings from Last 3 Encounters:   03/12/25 84.8 kg (187 lb)   03/06/25 85.3 kg (188 lb)   02/27/25 82.6 kg (182 lb)     BP Readings from Last 3 Encounters:   03/12/25 127/77   03/06/25 127/84   02/27/25 142/92     Physical Exam  Eyes:      General:         Right eye: Discharge present.         Left eye: Discharge present.       Appearance: No acute distress, well-nourished  Head: normocephalic, atraumatic  Eyes: extraocular movements intact, no scleral icterus, no conjunctival injection  Ears, Nose, and Throat: external ears normal, nares patent, moist mucous membranes, tympanic membranes clear bilaterally. Tonsils within normal limits. Oropharynx clear.   Cardiovascular: regular rate and rhythm. no murmurs, rubs, or gallops. no edema  Respiratory: breathing comfortably, symmetric chest rise, clear to auscultation bilaterally. No wheezes, rales, or rhonchi.  Neuro: alert and moves all extremities equally  Lymph: no occipital, cervical, submandibular,or supraclavicular lymphadenopathy.      Result Review :   The following data was reviewed by: KASSANDRA Wilder on 03/12/2025:  Common labs          9/5/2024    12:39 12/5/2024    12:37 3/6/2025    13:01   Common Labs   Glucose 89  90  88    BUN 17  20  15    Creatinine 0.96  1.11  1.00    Sodium 139  136  141    Potassium 3.7  3.7  4.6    Chloride 100  97  99    Calcium 10.0  9.6  10.2    Albumin 4.6  4.3  4.0    Total Bilirubin 0.4  0.4  0.5    Alkaline Phosphatase 71  66  72    AST (SGOT) 21  25  27    ALT " (SGPT) 25  23  28    WBC 6.87  6.88  6.63    Hemoglobin 13.6  14.3  14.1    Hematocrit 42.5  43.1  42.8    Platelets 278  326  292    Total Cholesterol 165  179  149    Triglycerides 182  289  107    HDL Cholesterol 53  49  59    LDL Cholesterol  81  83  71    Hemoglobin A1C 6.00  5.90  6.10             Lab Results   Component Value Date    SARSANTIGEN Not Detected 09/27/2023    COVID19 Not Detected 09/27/2023    RAPFLUA Negative 05/09/2022    RAPFLUB Negative 05/09/2022    FLUAAG Not Detected 09/27/2023    FLUBAG Not Detected 09/27/2023    RAPSCRN Negative 04/12/2023    INR 1.0 02/27/2023    BILIRUBINUR Negative 09/16/2024          Assessment and Plan    Diagnoses and all orders for this visit:    1. Dry eyes (Primary)  -     carboxymethylcellulose sod 1 % gel eye gel; Administer 2 drops to both eyes 3 (Three) Times a Day As Needed (eye dryness).  Dispense: 15 each; Refill: 0    2. Conjunctivitis of both eyes, unspecified conjunctivitis type  -     trimethoprim-polymyxin b (Polytrim) 04828-3.1 UNIT/ML-% ophthalmic solution; Administer 1 drop to both eyes Every 4 (Four) Hours.  Dispense: 10 mL; Refill: 0        Assessment & Plan      There are no discontinued medications.       Follow Up   No follow-ups on file.  Patient was given instructions and counseling regarding her condition or for health maintenance advice. Please see specific information pulled into the AVS if appropriate.     Patient or patient representative verbalized consent for the use of Ambient Listening during the visit with  KASSANDRA Wilder for chart documentation. 3/12/2025  13:40 EDT    KASSANDRA Wilder  03/12/25  13:41 EDT

## 2025-03-14 ENCOUNTER — OFFICE VISIT (OUTPATIENT)
Dept: SLEEP MEDICINE | Facility: HOSPITAL | Age: 55
End: 2025-03-14
Payer: COMMERCIAL

## 2025-03-14 VITALS
HEIGHT: 64 IN | DIASTOLIC BLOOD PRESSURE: 64 MMHG | WEIGHT: 183 LBS | HEART RATE: 90 BPM | OXYGEN SATURATION: 97 % | SYSTOLIC BLOOD PRESSURE: 125 MMHG | BODY MASS INDEX: 31.24 KG/M2

## 2025-03-14 DIAGNOSIS — G47.19 EXCESSIVE DAYTIME SLEEPINESS: ICD-10-CM

## 2025-03-14 DIAGNOSIS — R06.83 SNORING: Primary | ICD-10-CM

## 2025-03-14 PROCEDURE — G0463 HOSPITAL OUTPT CLINIC VISIT: HCPCS | Performed by: STUDENT IN AN ORGANIZED HEALTH CARE EDUCATION/TRAINING PROGRAM

## 2025-03-14 RX ORDER — CARBOXYMETHYLCELLULOSE SODIUM AND GLYCERIN 10; 9 MG/ML; MG/ML
SOLUTION/ DROPS OPHTHALMIC
COMMUNITY
Start: 2025-03-12

## 2025-03-14 NOTE — PROGRESS NOTES
[facility]   [address]  [place] [address]  [phone]     Referring physician/provider: Isa Wolf A*   PCP: Isa Wolf APRN    Type of service: Initial Sleep Medicine Consult.  Date of service: 3/14/2025        Sleep Clinic Initial Consult Visit      Patient or patient representative verbalized consent for the use of Ambient Listening during the visit with  Alberto Roa DO for chart documentation. 3/14/2025  16:32 EDT    HISTORY OF PRESENT ILLNESS  Chief Complaint: Concern for obstructive sleep apnea  Holly Apple is a 54 y.o. female that was seen today, on 3/14/2025 at [facility].  History of Present Illness  The patient presents for evaluation of sleep apnea.  She has had loud snoring for several years. She has previously been told after a surgery that she should get checked for CAIO.  she has not undergone any sleep testing. She reports her daughter has observed her snoring and apneic episodes. Occasionally, she experiences awakenings due to snoring or gasping, particularly during allergy season. She also reports daytime fatigue has does have daytime sleepiness.  Her sleep schedule is irregular, typically retiring between 2:00 AM and 3:00 AM due to caregiving responsibilities for her daughter. Once asleep, she experiences 1-2 awakenings often necessitated by bathroom visits. Her wake time varies, with a typical range between 10:45 AM and 12:00 PM. She has a history of sleep paralysis in childhood but reports no recent episodes within the past year. She does not experience hallucinations or muscle weakness associated with emotional responses such as laughter or excitement. She has lost weight and wonders if this has improved her snoring. She expresses a desire for increased energy levels.  She is currently on Mounjaro and has been losing some weight.    Medical history  She has a past medical  "history of high blood pressure, diabetes, and neck surgery. She is currently on Mounjaro,     FAMILY HISTORY  Her sister has sleep apnea.    History of Sleep Study before: No  ESS today: 3  Occupation: Unemployed    Symptoms:   Have you ever awakened gasping for breath, coughing, choking:  [x]   Yes     []   No   Witnessed apneas: [x]   Yes     []   No   Loud Snoring: [x]   Yes     []   No   Do you drive a commercial vehicle:  []   Yes     [x]   No   History of any near accidents while driving due to sleepiness in the past 5 years: []   Yes     [x]   No     Social History:  Tobacco use: Former smoker  Alcohol use: None  Caffeinated drinks: 3 drinks per day  OB History          2    Para   2    Term   2       0    AB   0    Living   2         SAB   0    IAB   0    Ectopic   0    Molar   0    Multiple   0    Live Births   2                Family hx(parents and siblings) (pertaining to sleep medicine)  Sister had sleepwalking    ROS:    Negative for:  Symptoms consistent with the clinical diagnosis of Restless legs syndrome  Symptoms consistent with the clinical diagnosis of Cataplexy   Sleep walking   See scanned media document for other sleep related questions      Allergies: Diethyl toluamide (deet); Fluconazole; Measles, mumps & rubella vac; Wasp venom; Egg-derived products; Ibuprofen; Measles virus live vaccine; Peanut-containing drug products; and Ciprofloxacin       Objective   Vital Signs:   Vitals:    25 1500   BP: 125/64   BP Location: Left arm   Patient Position: Sitting   Pulse: 90   SpO2: 97%   Weight: 83 kg (183 lb)   Height: 162.6 cm (64.02\")   PainSc: 3   Comment: NECK PAIN     Body mass index is 31.4 kg/m².      PHYSICAL EXAM  CONSTITUTIONAL:  Non-toxic, In no overt distress   ENT: Mallampati class 3  NECK:Neck Circumference: 15 inches  RESPIRATORY SYSTEM: Breathing appears nonlabored, no wheezes or rales  CARDIOVASULAR SYSTEM: Regular rate, no murmurs  NEUROLOGICAL SYSTEM: answers " questions appropriately      Result Review   The following data was reviewed by: Alberto Roa DO on 03/14/2025:  [x]  Medications reviewed        ASSESSMENT/PLAN  Diagnoses and all orders for this visit:    1. Snoring (Primary)  -     Home Sleep Study; Future    2. Excessive daytime sleepiness  -     Home Sleep Study; Future      I have discussed home sleep apnea testing (HSAT). HSAT ordered. Discussed if HSAT is negative or inconclusive will proceed with in lab sleep study.  Discussed recommendation for starting treatment with positive airway pressure if obstructive sleep apnea is present.   Patient is agreeable to starting treatment with PAP      Obesity class I, patient's BMI is Body mass index is 31.4 kg/m².. I have discussed the relationship between weight and sleep apnea.There is direct correlation between weight and severity of sleep apnea.  Weight reduction is encouraged, as it may reduce the severity of sleep apnea.        I have also discussed with the patient the following  Untreated CAIO is associated with increased risks of stroke, heart attack, heart failure, motor vehicle accidents.   Recommended no driving or operating machinery if feeling sleepy. Discussed options of taking naps and getting rides with other people.   Generally most people need about 7 to 9 hours of sleep per night.       FOLLOW UP  Return for 31 to 90 days after PAP setup, Follow up after study.  Patient was given instructions and counseling regarding her condition or for health maintenance advice. Please see specific information pulled into the AVS if appropriate.         Patient's questions were answered.  Thank you for allowing me to participate in the care of this patient.  Dictated Utilizing Dragon Dictation. Please note that portions of this note were completed with a voice recognition program. Part of this note may be an electronic transcription/translation of spoken language to printed text using the Dragon Dictation  System.      [facility]   Alberto Roa DO  03/14/25  16:37 EDT

## 2025-03-17 ENCOUNTER — NURSE TRIAGE (OUTPATIENT)
Dept: CALL CENTER | Facility: HOSPITAL | Age: 55
End: 2025-03-17
Payer: COMMERCIAL

## 2025-03-17 NOTE — TELEPHONE ENCOUNTER
"Hub- The refresh has made the eye puffy and she has thick liquid discharged. She thinks she had a reaction to the refresh gel- She is very aggravated and unhappy. She was seen in the office on 03/12/25- for dry eyes. She was prescribed prescription eye drops.    The back line was called.  Twice - no answer.  She states it does not matter she needs a steroid for eye. She will go to urgent care if she has to. Will send an urgent message.  Declined ER and triage. She request a steroid be ordered for her eyes. A message will be sent.   Reason for Disposition   [1] Caller requests to speak ONLY to PCP AND [2] URGENT question    Additional Information   Negative: Lab calling with strep throat test results and triager can call in prescription   Negative: Lab calling with urinalysis test results and triager can call in prescription   Negative: Medication questions   Negative: Medication renewal and refill questions   Negative: Pre-operative or pre-procedural questions   Negative: ED call to PCP (i.e., primary care provider; doctor, NP, or PA)   Negative: Doctor (or NP/PA) call to PCP   Negative: Call about patient who is currently hospitalized   Negative: Lab or radiology calling with CRITICAL test results   Negative: [1] Follow-up call from patient regarding patient's clinical status AND [2] information urgent    Answer Assessment - Initial Assessment Questions  1. REASON FOR CALL or QUESTION: \"What is your reason for calling today?\" or \"How can I best  help you?\" or \"What question do you have that I can help answer?\"      Medication for eyes.   2. CALLER: Document the source of call. (e.g., laboratory, patient).      Patient.    Protocols used: PCP Call - No Triage-ADULT-    "

## 2025-03-18 ENCOUNTER — TELEPHONE (OUTPATIENT)
Dept: INTERNAL MEDICINE | Facility: CLINIC | Age: 55
End: 2025-03-18

## 2025-03-18 ENCOUNTER — NURSE TRIAGE (OUTPATIENT)
Dept: CALL CENTER | Facility: HOSPITAL | Age: 55
End: 2025-03-18
Payer: COMMERCIAL

## 2025-03-18 PROCEDURE — 87205 SMEAR GRAM STAIN: CPT | Performed by: FAMILY MEDICINE

## 2025-03-18 PROCEDURE — 87070 CULTURE OTHR SPECIMN AEROBIC: CPT | Performed by: FAMILY MEDICINE

## 2025-03-18 NOTE — TELEPHONE ENCOUNTER
HUB Call - States caller's third call requesting steroid drops  for eyes. States both HUB and NCC have been unable to reach PCP office. Advised HUB if both HUB and NCC unable to reach provider to please check with supervisor to see if she has direct line to  and notify of inability to reach. Instructed to send patient to me while she does that.     Spoke with caller . Noted  that caller advised yesterday to go to [facility] per [facility] if no response received from Provider. Caller states she did not and requesting again to speak with PCP office regarding sending in Rx for steroid eye drops. Advised that [facility] has tried to get thru with no success and this RN will as well but most likely will need to be seen before Rx sent. Verbalized understanding and stated  just needs to speak directly with PCP and if can't get Rx will then go to [facility] for swelling and redness of eyes.    Attempt to contact PCP office through warm transfer number, [phone] failed x 2. Did get through to office by calling office at [phone]. Advised staff member of call and then transferred though.    Reason for Disposition   MODERATE eye pain or discomfort (e.g., interferes with normal activities or awakens from sleep; more than mild)    Additional Information   Negative: Followed an eye injury   Negative: Eye pain from chemical in the eye   Negative: Eye pain from foreign body in eye   Negative: Has sinus pain or pressure   Negative: SEVERE eye pain   Negative: Complete loss of vision in one or both eyes   Negative: Eyelids are very swollen (shut or almost) and fever   Negative: Eyelid (outer) is very red and fever   Negative: Foreign body sensation ('feels like something is in there') and irrigation didn't help   Negative: Vomiting   Negative: Ulcer or sore seen on the cornea (clear center part of the eye)   Negative: Recent eye surgery and increasing eye pain   Negative: Blurred vision and new or worsening   Negative: Patient sounds very sick  "or weak to the triager    Answer Assessment - Initial Assessment Questions  1. ONSET: \"When did the pain start?\" (e.g., minutes, hours, days)      Yesterday  2. TIMING: \"Does the pain come and go, or has it been constant since it started?\" (e.g., constant, intermittent, fleeting)      Constant  3. SEVERITY: \"How bad is the pain?\"   (Scale 1-10; mild, moderate or severe)    - MILD (1-3): doesn't interfere with normal activities     - MODERATE (4-7): interferes with normal activities or awakens from sleep     - SEVERE (8-10): excruciating pain and patient unable to do normal activities      moderate  4. LOCATION: \"Where does it hurt?\"  (e.g., eyelid, eye, cheekbone)      Bilateral eyes  5. CAUSE: \"What do you think is causing the pain?\"      State medication reation  6. VISION: \"Do you have blurred vision or changes in your vision?\"       Yes  7. EYE DISCHARGE: \"Is there any discharge (pus) from the eye(s)?\"  If Yes, ask: \"What color is it?\"       Yes  8. FEVER: \"Do you have a fever?\" If Yes, ask: \"What is it, how was it measured, and when did it start?\"       no  9. OTHER SYMPTOMS: \"Do you have any other symptoms?\" (e.g., headache, nasal discharge, facial rash)      Denies  10. PREGNANCY: \"Is there any chance you are pregnant?\" \"When was your last menstrual period?\"        N/A    Protocols used: Eye Pain and Other Symptoms-ADULT-[place]    "

## 2025-03-18 NOTE — TELEPHONE ENCOUNTER
Spoke to nurse triage, stated pt was recommended UC yesterday due to reaction from eye drops.     Call transferred to office. I discussed with Holly her pcp was out of office yesterday and messages are still under review. The patient became very agitated and yelling through the phone that she needs to get a steroid for her eyes. She then stated she's coming into the office and promptly ended the call.

## 2025-03-18 NOTE — TELEPHONE ENCOUNTER
"Patient came in to the office and stated that she has tried to get an appointment with Isa. She said she prescribed her eye drops and she is having a reaction to it and needs steroids. Patient stated she was very angry with us and wanted to speak to someone higher up than our  to make a complaint. I told her Isa's next available appointment was not until next Thursday the 27 th. Patient stated \"Was that so hard?\" and said she was going to urgent care. She then walked away and very forcefully shoved the doors open and left. Patient was very agitated the whole interaction.   "

## 2025-03-27 ENCOUNTER — HOSPITAL ENCOUNTER (OUTPATIENT)
Dept: SLEEP MEDICINE | Facility: HOSPITAL | Age: 55
Discharge: HOME | End: 2025-03-27
Admitting: STUDENT IN AN ORGANIZED HEALTH CARE EDUCATION/TRAINING PROGRAM
Payer: COMMERCIAL

## 2025-03-27 DIAGNOSIS — G47.19 EXCESSIVE DAYTIME SLEEPINESS: ICD-10-CM

## 2025-03-27 DIAGNOSIS — R06.83 SNORING: ICD-10-CM

## 2025-03-27 PROCEDURE — G0399 HOME SLEEP TEST/TYPE 3 PORTA: HCPCS

## 2025-03-31 NOTE — TELEPHONE ENCOUNTER
Lab comments were put into [facility] so she wouldn't have received a call.     Thanks for doing that

## 2025-03-31 NOTE — TELEPHONE ENCOUNTER
I called and spoke with patient. Patient states she was never informed of her 3/7/25 lab results. I did go over those with patient, and make sure patients eye was doing good. I offered to schedule an appointment for a follow up with you, but she declined one at this time. She does have her regular follow up 6/6/25. I have given patient my direct line, in case she encounters any other issues.

## 2025-04-01 ENCOUNTER — HOSPITAL ENCOUNTER (OUTPATIENT)
Dept: MRI IMAGING | Facility: HOSPITAL | Age: 55
Discharge: HOME | End: 2025-04-01
Admitting: ORTHOPAEDIC SURGERY
Payer: COMMERCIAL

## 2025-04-01 DIAGNOSIS — M25.562 LEFT KNEE PAIN, UNSPECIFIED CHRONICITY: ICD-10-CM

## 2025-04-01 PROCEDURE — 73721 MRI JNT OF LWR EXTRE W/O DYE: CPT

## 2025-04-03 ENCOUNTER — TELEPHONE (OUTPATIENT)
Dept: ORTHOPEDIC SURGERY | Facility: CLINIC | Age: 55
End: 2025-04-03
Payer: COMMERCIAL

## 2025-04-03 NOTE — TELEPHONE ENCOUNTER
Name: StephanieHolly whitman      Relationship: Self      Best Callback Number: [phone]       HUB PROVIDED THE RELAY MESSAGE FROM THE OFFICE      PATIENT: Holly Apple     ADDITIONAL INFORMATION:     PT WOULD LIKE TO HAVE AN AFTERNOON APPOINTMENT ON 4.17.25    TRIED TO WT- TO CONFIRM- NO ANSWER

## 2025-04-04 DIAGNOSIS — G47.33 OSA (OBSTRUCTIVE SLEEP APNEA): Primary | ICD-10-CM

## 2025-04-07 ENCOUNTER — TELEPHONE (OUTPATIENT)
Dept: SLEEP MEDICINE | Facility: HOSPITAL | Age: 55
End: 2025-04-07
Payer: COMMERCIAL

## 2025-04-14 ENCOUNTER — OFFICE VISIT (OUTPATIENT)
Dept: PODIATRY | Facility: CLINIC | Age: 55
End: 2025-04-14
Payer: COMMERCIAL

## 2025-04-14 VITALS
BODY MASS INDEX: 32.06 KG/M2 | TEMPERATURE: 95.8 F | SYSTOLIC BLOOD PRESSURE: 120 MMHG | WEIGHT: 187.8 LBS | OXYGEN SATURATION: 94 % | HEIGHT: 64 IN | HEART RATE: 89 BPM | DIASTOLIC BLOOD PRESSURE: 83 MMHG

## 2025-04-14 DIAGNOSIS — M79.671 FOOT PAIN, BILATERAL: ICD-10-CM

## 2025-04-14 DIAGNOSIS — E11.8 DM FEET: ICD-10-CM

## 2025-04-14 DIAGNOSIS — M79.672 FOOT PAIN, BILATERAL: ICD-10-CM

## 2025-04-14 DIAGNOSIS — M19.072 ARTHRITIS OF BOTH FEET: ICD-10-CM

## 2025-04-14 DIAGNOSIS — M19.071 ARTHRITIS OF BOTH FEET: ICD-10-CM

## 2025-04-14 DIAGNOSIS — E11.9 DIABETES MELLITUS WITHOUT COMPLICATION: Primary | ICD-10-CM

## 2025-04-14 PROCEDURE — 1160F RVW MEDS BY RX/DR IN RCRD: CPT | Performed by: PODIATRIST

## 2025-04-14 PROCEDURE — 99203 OFFICE O/P NEW LOW 30 MIN: CPT | Performed by: PODIATRIST

## 2025-04-14 PROCEDURE — 3079F DIAST BP 80-89 MM HG: CPT | Performed by: PODIATRIST

## 2025-04-14 PROCEDURE — 3074F SYST BP LT 130 MM HG: CPT | Performed by: PODIATRIST

## 2025-04-14 PROCEDURE — 1159F MED LIST DOCD IN RCRD: CPT | Performed by: PODIATRIST

## 2025-04-14 NOTE — PROGRESS NOTES
[facility] - PODIATRY    Today's Date: 04/14/25    Patient Name: Holly Apple  MRN: [id number]  CSN: [id number]  PCP: Isa Wolf APRN  Referring Provider: Isa Wolf A*    Patient or patient representative verbalized consent for the use of Ambient Listening during the visit with  Joaquim Saxena DPM for chart documentation. 4/14/2025  14:49 EDT    SUBJECTIVE     Chief Complaint   Patient presents with    Right Foot - Pain, Establish Care, Diabetes         Left Foot - Establish Care, Diabetes          HPI: Holly Apple, a 54 y.o.female, presents to clinic.    New    History of Present Illness  The patient notes upcoming appointment with Dr. Edwards, rheumatology.    Pt states she presents for a DM foot check.    The patient presents for evaluation of right foot pain.    She reports experiencing significant discomfort in her right foot, which has been affecting her mobility. 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 At the time, she was pregnant and carrying her 56-year-old daughter, which led to persistent foot issues. She did not seek medical attention or obtain an x-ray due to her pregnancy. She also mentions that her feet were particularly painful during her pregnancy, leading her to suspect a calcium deficiency. She expresses concern about a potential fungal infection. She does not experience any numbness, tingling, or burning sensations at night but notes that her feet often feel cold. She attempts to alleviate this by moving her toes. She has a history of plantar fasciitis and fallen arches. She typically wears ASICS shoes and UGGs during the winter months.    She is diabetic. Her A1c initially was 7.4, and she was started on Mounjaro, which brought it down to 6.0, and now it is 6.1. She had surgery on 03/01/2023, and when she  went there and did the testing before the surgery, they told her everything was good, but after the surgery, they asked if she was diabetic. She has not been on Ozempic.    MEDICATIONS  Current: Mounjaro    Patient denies any fevers, chills, nausea, vomiting, shortness of breath, nor any other constitutional signs nor symptoms.      Past Medical History:   Diagnosis Date    Arthritis     DDD (degenerative disc disease), cervical     Diabetes mellitus     Eczema     GENERALIZED    Fibroid     Foot pain, right     Great toe pain, left     Hyperlipidemia     Hypertension     Seasonal allergies     Tuberculosis     NONACTIVE, NO CURRENT ISSUES, FOLLOWS W/PCP    Uterine prolapse      Past Surgical History:   Procedure Laterality Date    NECK SURGERY  2023    Fusion, Plate and Cage    VAGINAL HYSTERECTOMY N/A 2021    Procedure: VAGINAL HYSTERECTOMY;  Surgeon: Popeye Fox MD;  Location: [facility] MAIN OR;  Service: Gynecology;  Laterality: N/A;    WISDOM TOOTH EXTRACTION       Family History   Problem Relation Age of Onset    Diabetes Father     Sleep walking Sister     Obesity Brother     Diabetes Paternal Grandmother     Ovarian cancer Neg Hx     Breast cancer Neg Hx     Uterine cancer Neg Hx     Colon cancer Neg Hx     Melanoma Neg Hx     Prostate cancer Neg Hx     Deep vein thrombosis Neg Hx      Social History     Socioeconomic History    Marital status:    Tobacco Use    Smoking status: Former     Current packs/day: 0.00     Average packs/day: 1 pack/day for 20.0 years (20.0 ttl pk-yrs)     Types: Cigarettes     Start date:      Quit date:      Years since quittin.2     Passive exposure: Past    Smokeless tobacco: Never    Tobacco comments:     QUIT APPROX 16 YRS   Vaping Use    Vaping status: Never Used   Substance and Sexual Activity    Alcohol use: Never    Drug use: Never    Sexual activity: Defer     Birth control/protection: Hysterectomy     Allergies   Allergen Reactions     Diethyl Toluamide (Deet) Anaphylaxis    Fluconazole Swelling and Unknown - High Severity    Measles, Mumps & Rubella Vac Unknown - High Severity     Vaccine    Wasp Venom Anaphylaxis    Egg-Derived Products GI Intolerance    Ibuprofen GI Intolerance    Measles Virus Live Vaccine Other (See Comments)     DEVELOPED FEVER AS A CHILD    Peanut-Containing Drug Products Unknown - High Severity     STATES CAUSES THROAT TINGLING    Ciprofloxacin Rash     Current Outpatient Medications   Medication Sig Dispense Refill    Blood Glucose Monitoring Suppl (FreeStyle Lite) w/Device kit       Collagen-Vitamin C-Biotin (COLLAGEN PO) Take  by mouth Daily.      CRANBERRY PO Take  by mouth.      Diclofenac Sodium (VOLTAREN) 1 % gel gel Apply 4 g topically to the appropriate area as directed 4 (Four) Times a Day As Needed (pain). 350 g 0    EPINEPHrine (EPIPEN) 0.3 MG/0.3ML solution auto-injector injection Inject 0.3 mL into the appropriate muscle as directed by prescriber 1 (One) Time As Needed (anaphylactic shock) for up to 1 dose. 1 each 2    erythromycin (ROMYCIN) 5 MG/GM ophthalmic ointment Apply every 4 hours to each for the first 48 hours and every 6 hours after for 5 more days 3.5 g 0    fexofenadine (ALLEGRA) 180 MG tablet Take 1 tablet by mouth Every Night.      glucose blood test strip Daily in the AM, fasting 100 each 12    glucose monitor monitoring kit Use 1 each Daily. 1 each 0    Lancets (accu-chek multiclix) lancets Use one daily in the AM, fasting 102 each 12    losartan-hydrochlorothiazide (HYZAAR) 50-12.5 MG per tablet Take 1 tablet by mouth Daily. 90 tablet 1    metFORMIN (GLUCOPHAGE) 500 MG tablet Take 1 tablet by mouth 2 (Two) Times a Day With Meals. 180 tablet 1    minoxidil (LONITEN) 10 MG tablet Take 1 tablet by mouth Daily. 30 tablet 2    multivitamin (MULTIVITAMIN PO) Take  by mouth Daily.      Omega-3 Fatty Acids (FISH OIL PO) Take  by mouth Daily.      Probiotic Product (PROBIOTIC PO) Take 1 each by  mouth Daily.      Refresh Optive 1-0.9 % gel PLACE 2 DROPS IN BOTH EYES THREE TIMES DAILY AS NEEDED FOR EYE DRYNESS      rosuvastatin (Crestor) 5 MG tablet Take 1 tablet by mouth Daily. 90 tablet 1    Tirzepatide 5 MG/0.5ML solution auto-injector Inject 5 mg under the skin into the appropriate area as directed Every 7 (Seven) Days. 2 mL 2    carboxymethylcellulose sod 1 % gel eye gel Administer 2 drops to both eyes 3 (Three) Times a Day As Needed (eye dryness). (Patient not taking: Reported on 4/14/2025) 15 each 0     No current facility-administered medications for this visit.       OBJECTIVE     Vitals:    04/14/25 1400   BP: 120/83   Pulse: 89   Temp: 95.8 °F (35.4 °C)   SpO2: 94%       WBC   Date Value Ref Range Status   03/06/2025 6.63 3.40 - 10.80 10*3/mm3 Final   02/27/2023 5.71 4.5 - 11.0 10*3/uL Final     RBC   Date Value Ref Range Status   03/06/2025 5.43 (H) 3.77 - 5.28 10*6/mm3 Final   02/27/2023 5.60 (H) 4.0 - 5.2 10*6/uL Final     Hemoglobin   Date Value Ref Range Status   03/06/2025 14.1 12.0 - 15.9 g/dL Final   02/27/2023 14.4 12.0 - 16.0 g/dL Final     Hematocrit   Date Value Ref Range Status   03/06/2025 42.8 34.0 - 46.6 % Final   02/27/2023 43.1 36.0 - 46.0 % Final     MCV   Date Value Ref Range Status   03/06/2025 78.8 (L) 79.0 - 97.0 fL Final   02/27/2023 77.0 (L) 80.0 - 100.0 fL Final     MCH   Date Value Ref Range Status   03/06/2025 26.0 (L) 26.6 - 33.0 pg Final   02/27/2023 25.7 (L) 26.0 - 34.0 pg Final     MCHC   Date Value Ref Range Status   03/06/2025 32.9 31.5 - 35.7 g/dL Final   02/27/2023 33.4 31.0 - 37.0 g/dL Final     RDW   Date Value Ref Range Status   03/06/2025 13.4 12.3 - 15.4 % Final   02/27/2023 14.3 12.0 - 16.8 % Final     RDW-SD   Date Value Ref Range Status   03/06/2025 38.2 37.0 - 54.0 fl Final     MPV   Date Value Ref Range Status   03/06/2025 8.8 6.0 - 12.0 fL Final   02/27/2023 8.6 8.4 - 12.4 fL Final     Platelets   Date Value Ref Range Status   03/06/2025 292 140 - 450  10*3/mm3 Final   02/27/2023 319 140 - 440 10*3/uL Final     Neutrophil Rel %   Date Value Ref Range Status   02/27/2023 49.2 45 - 80 % Final     Neutrophil %   Date Value Ref Range Status   03/06/2025 47.9 42.7 - 76.0 % Final     Lymphocyte Rel %   Date Value Ref Range Status   02/27/2023 42.0 15 - 50 % Final     Lymphocyte %   Date Value Ref Range Status   03/06/2025 42.8 19.6 - 45.3 % Final     Monocyte Rel %   Date Value Ref Range Status   02/27/2023 6.7 0 - 15 % Final     Monocyte %   Date Value Ref Range Status   03/06/2025 6.2 5.0 - 12.0 % Final     Eosinophil %   Date Value Ref Range Status   03/06/2025 2.4 0.3 - 6.2 % Final   02/27/2023 1.2 0 - 7 % Final     Basophil Rel %   Date Value Ref Range Status   02/27/2023 0.5 0 - 2 % Final     Basophil %   Date Value Ref Range Status   03/06/2025 0.5 0.0 - 1.5 % Final     Immature Grans %   Date Value Ref Range Status   03/06/2025 0.2 0.0 - 0.5 % Final   02/27/2023 0.4 0.0 - 1.0 % Final     Neutrophils Absolute   Date Value Ref Range Status   02/27/2023 2.81 2.0 - 8.8 10*3/uL Final     Neutrophils, Absolute   Date Value Ref Range Status   03/06/2025 3.18 1.70 - 7.00 10*3/mm3 Final     Lymphocytes Absolute   Date Value Ref Range Status   02/27/2023 2.40 0.7 - 5.5 10*3/uL Final     Lymphocytes, Absolute   Date Value Ref Range Status   03/06/2025 2.84 0.70 - 3.10 10*3/mm3 Final     Monocytes Absolute   Date Value Ref Range Status   02/27/2023 0.38 0.0 - 1.7 10*3/uL Final     Monocytes, Absolute   Date Value Ref Range Status   03/06/2025 0.41 0.10 - 0.90 10*3/mm3 Final     Eosinophils Absolute   Date Value Ref Range Status   02/27/2023 0.07 0.0 - 0.8 10*3/uL Final     Eosinophils, Absolute   Date Value Ref Range Status   03/06/2025 0.16 0.00 - 0.40 10*3/mm3 Final     Basophils Absolute   Date Value Ref Range Status   02/27/2023 0.03 0.0 - 0.2 10*3/uL Final     Basophils, Absolute   Date Value Ref Range Status   03/06/2025 0.03 0.00 - 0.20 10*3/mm3 Final     Immature  Grans, Absolute   Date Value Ref Range Status   03/06/2025 0.01 0.00 - 0.05 10*3/mm3 Final   02/27/2023 0.02 0.00 - 0.10 10*3/uL Final     nRBC   Date Value Ref Range Status   03/06/2025 0.0 0.0 - 0.2 /100 WBC Final         Lab Results   Component Value Date    GLUCOSE 88 03/06/2025    BUN 15 03/06/2025    CREATININE 1.00 03/06/2025     03/06/2025    K 4.6 03/06/2025    CL 99 03/06/2025    CALCIUM 10.2 03/06/2025    PROTEINTOT 7.7 03/06/2025    ALBUMIN 4.0 03/06/2025    ALT 28 03/06/2025    AST 27 03/06/2025    ALKPHOS 72 03/06/2025    BILITOT 0.5 03/06/2025    GLOB 3.7 03/06/2025    AGRATIO 1.1 03/06/2025    BCR 15.0 03/06/2025    ANIONGAP 13.3 03/06/2025    EGFR 66.3 03/06/2025       Patient seen in no apparent distress.      PHYSICAL EXAM:     Foot/Ankle Exam    GENERAL  Diabetic foot exam performed    Appearance:  appears stated age and obese  Orientation:  AAOx3  Affect:  appropriate  Gait:  unimpaired  Assistance:  independent  Right shoe gear: casual shoe  Left shoe gear: casual shoe    VASCULAR     Right Foot Vascularity   Dorsalis pedis:  2+  Posterior tibial:  2+  Skin temperature:  warm  Edema grading:  None  CFT:  < 3 seconds  Pedal hair growth:  Present  Varicosities:  mild varicosities     Left Foot Vascularity   Dorsalis pedis:  2+  Posterior tibial:  2+  Skin temperature:  warm  Edema grading:  None  CFT:  < 3 seconds  Pedal hair growth:  Present  Varicosities:  mild varicosities     NEUROLOGIC     Right Foot Neurologic   Normal sensation    Light touch sensation: normal  Vibratory sensation: normal  Hot/Cold sensation: normal  Protective Sensation using [place] Monofilament:   Sites intact: 10  Sites tested: 10     Left Foot Neurologic   Normal sensation    Light touch sensation: normal  Vibratory sensation: normal  Hot/Cold sensation:  normal  Protective Sensation using [place] Monofilament:   Sites intact: 10  Sites tested: 10    MUSCLE STRENGTH     Right Foot Muscle  Strength   Foot dorsiflexion:  4  Foot plantar flexion:  4  Foot inversion:  4  Foot eversion:  4     Left Foot Muscle Strength   Foot dorsiflexion:  4  Foot plantar flexion:  4  Foot inversion:  4  Foot eversion:  4    RANGE OF MOTION     Right Foot Range of Motion   Foot and ankle ROM within normal limits       Left Foot Range of Motion   Foot and ankle ROM within normal limits      DERMATOLOGIC      Right Foot Dermatologic   Skin  Right foot skin is intact.      Left Foot Dermatologic   Skin  Left foot skin is intact.       Physical Exam  Pulses in the feet are normal.    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  Diffuse degenerative changes in midfoot.  Inferior calcaneal enthesopathy.  Slight anterior cyma line position seen on lateral view.  Early osteopenia changes seen throughout consistent with the patient's age. Comparison: No comparison views available.     XR Foot 3+ View Right  Result Date: 4/14/2025  Narrative: IN-OFFICE IMAGING:  Standing, weightbearing, 3 view, AP, MO, Lateral, Right foot Indication:  Foot Pain Findings: Early degenerative changes of first metatarsal phalangeal joint.  Diffuse degenerative changes in midfoot.  Inferior calcaneal enthesopathy.  Slight anterior cyma line position seen on lateral view.  Early osteopenia changes seen throughout consistent with the patient's age. Comparison: No comparison views available.    ASSESSMENT/PLAN     Diagnoses and all orders for this visit:    1. Diabetes mellitus without complication (Primary)    2. DM feet    3. Foot pain, bilateral    4. Arthritis of both feet        Assessment & Plan  1. Right foot pain.  The patient's right foot pain is likely due to arthritis, as there is no recent injury reported.  The pain has been persistent and affects her walking. She is advised to wear supportive shoes to alleviate discomfort. No diabetic shoes are needed as her diabetes is well-controlled.    2. Diabetes mellitus.  Her diabetes is well-managed with an A1c level of 6.1, achieved through the use of Mounjaro. She reports no numbness, tingling, or burning sensations in her feet, and her pulses are good. She should continue her current medication regimen and maintain regular follow-ups with her endocrinologist.    Comprehensive lower extremity examination and evaluation was performed.    Discussed findings and treatment plan including risks, benefits, and treatment options with patient in detail. Patient agreed with treatment plan.    Medications and allergies reviewed.  Reviewed available lab values along with other pertinent labs.  These were discussed with the patient.    Discussed proper shoegear for the patient's feet and medical condition.  Patient states understanding and agreement with this plan.    Diabetic foot exam performed and documented this date, compliant with CQM required standards. Detail of findings as noted in physical exam.  Lower extremity Neurologic exam for diabetic patient performed and documented this date, compliant with PQRS required standards. Detail of findings as noted in physical exam.  Advised patient importance of good routine lower extremity hygiene. Advised patient importance of evaluating for intact skin and pain free nail borders.  Advised patient to use mirror to evaluate plantar/ soles of feet for better visualization. Advised patient monitor and phone office to be seen if any cracking to skin, open lesions, painful nail borders or if nails become elongated prior to next visit. Advised patient importance of daily cleansing of lower extremities, followed by good skin cream to maintain normal hydration of skin. Also advised patient importance of close daily monitoring of blood sugar.  Advised to regulate diet and medications to maintain control of blood sugar in optimal range. Contact primary care provider if difficulties maintaining blood sugar levels.  Advised Patient of presence of Diabetes Mellitus condition.  Advised Patient risk of progression and worsening or improvement, then return of condition.  Will monitor condition for any change in future. Treat with most appropriate treatment pending status of condition.  Counseled and advised patient extensively on nature and ramifications of diabetes. Standard instructions given to patient for good diabetic foot care and maintenance. Advised importance of careful monitoring to avoid break down and complications secondary to diabetes. Advised patient importance of strict maintenance of blood sugar control. Advised patient of possible ominous results from neglect of condition, i.e.: amputation/ loss of digits, feet and legs, or even death.  Patient states understands counseling, will monitor closely, continue good hygiene and routine diabetic foot care. Patient will contact office is questions or problems.      Patient is to monitor for recurrence and any new symptoms and to contact Dr. Saxena's office for a follow-up appointment.      The patient states understanding and agreement with this plan.    An After Visit Summary was printed and given to the patient at discharge, including (if requested) any available informative/educational handouts regarding diagnosis, treatment, or medications. All questions were answered to patient/family satisfaction. Should symptoms fail to improve or worsen they agree to call or return to clinic or to go to the Emergency Department. Discussed the importance of following up with any needed screening tests/labs/specialist appointments and any requested follow-up recommended by me today. Importance of maintaining follow-up discussed and patient accepts that missed appointments can delay diagnosis and potentially  lead to worsening of conditions.    Return in about 1 year (around 4/14/2026) for DFE., or sooner if acute issues arise.    This document has been electronically signed by Joaquim Saxena DPM on April 14, 2025 15:00 EDT

## 2025-04-14 NOTE — LETTER
April 14, 2025     KASSANDRA Wilder  [address]  [place] [address]    Patient: Holly Apple   YOB: 1970   Date of Visit: 4/14/2025       Dear KASSANDRA Wilder:    Thank you for referring Holly Apple to me for evaluation. Below are the relevant portions of my assessment and plan of care.    Encounter Diagnosis and Orders:  Diagnoses and all orders for this visit:    1. Diabetes mellitus without complication (Primary)    2. DM feet    3. Foot pain, bilateral    4. Arthritis of both feet        If you have questions, please do not hesitate to call me. I look forward to following Holly along with you.         Sincerely,        Joaquim Saxena DPM        CC: No Recipients

## 2025-04-17 ENCOUNTER — OFFICE VISIT (OUTPATIENT)
Dept: ORTHOPEDIC SURGERY | Facility: CLINIC | Age: 55
End: 2025-04-17
Payer: COMMERCIAL

## 2025-04-17 VITALS — BODY MASS INDEX: 31.92 KG/M2 | HEART RATE: 93 BPM | HEIGHT: 64 IN | WEIGHT: 187 LBS | OXYGEN SATURATION: 97 %

## 2025-04-17 DIAGNOSIS — M17.12 PRIMARY OSTEOARTHRITIS OF LEFT KNEE: Primary | ICD-10-CM

## 2025-04-17 RX ADMIN — TRIAMCINOLONE ACETONIDE 40 MG: 40 INJECTION, SUSPENSION INTRA-ARTICULAR; INTRAMUSCULAR at 15:33

## 2025-04-17 RX ADMIN — LIDOCAINE HYDROCHLORIDE 5 ML: 10 INJECTION, SOLUTION INFILTRATION; PERINEURAL at 15:33

## 2025-04-17 NOTE — PROGRESS NOTES
Chief Complaint  Pain and Follow-up of the Left Knee       Subjective      Holly Apple presents to [facility] for a follow up for her left knee. She was last seen in the office on 25 where we ordered an MRI on her left knee. She presents today for these results. Her left knee has been bothering her for several years but has gotten worse. She locates her pain to the patella region. She denies any specific injury, trauma, falls or prior surgery on her left knee. She has been to physical therapy and states this didn't help and made her pain worse. She then had pain with prolonged walking and going up and down stairs. She feels like her knee is going to give out. She has popping to her knee.     Allergies   Allergen Reactions    Diethyl Toluamide (Deet) Anaphylaxis    Fluconazole Swelling and Unknown - High Severity    Measles, Mumps & Rubella Vac Unknown - High Severity     Vaccine    Wasp Venom Anaphylaxis    Egg-Derived Products GI Intolerance    Ibuprofen GI Intolerance    Measles Virus Live Vaccine Other (See Comments)     DEVELOPED FEVER AS A CHILD    Peanut-Containing Drug Products Unknown - High Severity     STATES CAUSES THROAT TINGLING    Ciprofloxacin Rash        Social History     Socioeconomic History    Marital status:    Tobacco Use    Smoking status: Former     Current packs/day: 0.00     Average packs/day: 1 pack/day for 20.0 years (20.0 ttl pk-yrs)     Types: Cigarettes     Start date:      Quit date:      Years since quittin.3     Passive exposure: Past    Smokeless tobacco: Never    Tobacco comments:     QUIT APPROX 16 YRS   Vaping Use    Vaping status: Never Used   Substance and Sexual Activity    Alcohol use: Never    Drug use: Never    Sexual activity: Defer     Birth control/protection: Hysterectomy        I reviewed the patient's chief complaint, history of present illness, review of systems, past medical history, surgical history,  "family history, social history, medications, and allergy list.     Review of Systems     Constitutional: Denies fevers, chills, weight loss  Cardiovascular: Denies chest pain, shortness of breath  Skin: Denies rashes, acute skin changes  Neurologic: Denies headache, loss of consciousness  MSK: left knee pain       Vital Signs:   Pulse 93   Ht 162.6 cm (64\")   Wt 84.8 kg (187 lb)   SpO2 97%   BMI 32.10 kg/m²            Ortho Exam    Physical Exam  General:Alert. No acute distress   Left lower extremity: full extension, flexion to 130 degrees, mild crepitus with range of motion, stable to anterior/posterior drawer , stable to varus/valgus stress, negative Lachman's, pain with Stone's, pain with patella compression, distal neurovascularly intact, positive pulses, calf soft, positive EHL, FHL, GS, and TA. Sensation intact to all 5 nerves of the foot.     Large Joint: L knee  Date/Time: 4/17/2025 3:33 PM  Consent given by: patient  Site marked: site marked  Timeout: Immediately prior to procedure a time out was called to verify the correct patient, procedure, equipment, support staff and site/side marked as required   Supporting Documentation  Indications: pain   Procedure Details  Location: knee - L knee  Preparation: Patient was prepped and draped in the usual sterile fashion  Needle gauge: 21 G.  Medications administered: 40 mg triamcinolone acetonide 40 MG/ML; 5 mL lidocaine 1 %  Patient tolerance: patient tolerated the procedure well with no immediate complications    This injection documentation was Scribed for Jamison Noyola MD by iLane Arenas MA.  04/17/25   15:34 EDT   Imaging Results (Most Recent)       None             Result Review :       XR Foot 3+ View Left  Result Date: 4/14/2025  Narrative: IN-OFFICE IMAGING:  Standing, weightbearing, 3 view, AP, MO, Lateral, left foot Indication:  Foot Pain Findings: Degenerative changes in second metatarsal phalangeal joint consistent with Freiberg's " infarction.  Diffuse degenerative changes in midfoot.  Inferior calcaneal enthesopathy.  Slight anterior cyma line position seen on lateral view.  Early osteopenia changes seen throughout consistent with the patient's age. Comparison: No comparison views available.     XR Foot 3+ View Right  Result Date: 2025  Narrative: IN-OFFICE IMAGING:  Standing, weightbearing, 3 view, AP, MO, Lateral, Right foot Indication:  Foot Pain Findings: Early degenerative changes of first metatarsal phalangeal joint.  Diffuse degenerative changes in midfoot.  Inferior calcaneal enthesopathy.  Slight anterior cyma line position seen on lateral view.  Early osteopenia changes seen throughout consistent with the patient's age. Comparison: No comparison views available.    Home Sleep Study  Result Date: 2025  Narrative: [facility] Home Sleep Test Report 3/27/2025 Holly Apple 1970 [id number] Interpreting Physician: Alberto Roa DO Referring Physician:  Alberto Roa DO Primary Care Physician: Isa Wolf APRN Clinical Information: Patient is a 54 y.o. female  with loud snoring, witnessed apneas and daytime sleepiness. Date of Study: 2025 The patient's BMI: 31.39  Weight in k Methods: Study performed based on the standardized protocol using 4%  hypopnea desaturation criteria Home Sleep Study Findings: Recording start time 2:35 AM and recording end time 9:51 AM. Total recording time 479.7 minutes and monitoring time 270.5 minutes. The patient had 93 obstructive events and 30 partial obstructive events.  Central events 0.  AHI/JOAQUINA for total monitoring time is 29.1. There was 252 minutes spent in the supine position with a supine JOAQUINA of 33.4. Lowest oxygen saturation was 71%.   Time in minutes of oxygen saturation less than 90%: 20.2 minutes Mean heart rate is 79 with a high heart rate 112 and a low heart rate of 66 bpm. 324 total snoring episodes noted and percent time snoring 54%. Numerical  values may have been rounded to nearest whole number. Impression: Moderate obstructive sleep apnea with an AHI of 29.1/h and lowest oxygen saturation 71%. There was 20.2 minutes of time with oxygen saturation less than 90%. Plan: Recommend treatment with auto titrating positive airway pressure of 6-18cm H2O with follow up in sleep medicine clinic in 31-90 days after starting treatment. Weight loss may reduce the severity of Obstructive Sleep Apnea in this patient with a BMI of 31.39. Alberto Roa DO Sleep Medicine 04/04/25 08:54 EDT    MRI Knee Left Without Contrast  Result Date: 4/3/2025  Narrative: MRI KNEE LEFT  WO CONTRAST Date of Exam: 4/1/2025 12:25 PM EDT Indication: pain.  Comparison: None available. Technique:  Routine multiplanar/multisequence images of the left knee were obtained without contrast administration. Findings: The anterior and posterior cruciate ligaments are intact. The medial collateral ligament is intact. The components of the posterior lateral corner of the knee are intact. The distal iliotibial band insertion is intact. The anterior extensor mechanism of the knee is intact. There is no evidence for medial or lateral meniscal tear. Mild tricompartmental osteoarthritic degenerative changes are noted. There is evidence for articular cartilage loss, subchondral edema/cystic change, and osteophytosis. Mild full-thickness articular cartilage loss is noted within the patellofemoral compartment. No joint effusion is observed. No abnormal bone marrow signal is otherwise observed. The cortical margins are grossly intact. A lobular fluid signal focus is noted at the posterior margin of the lateral femoral condyle. This finding measures 2.1 x 0.8 cm  and suggests a ganglion cyst or synovial cyst.     Impression: Impression: 1.No evidence for ligament or tendon derangement. 2.Mild tricompartmental osteoarthritis is noted. Mild full-thickness articular cartilage loss is identified within the  patellofemoral compartment. 3.Evidence for developmental cyst at the posterior margin of the lateral femoral condyle measuring up to approximately 2.1 cm. Electronically Signed: Sheldon Garces MD  4/3/2025 11:01 AM EDT  Workstation ID: [id number]             Assessment and Plan     Diagnoses and all orders for this visit:    1. Primary osteoarthritis of left knee (Primary)        The patient presents here today for a follow up for her left knee. MRI results was discussed and reviewed with the patient today in the office.     Discussed the risks and benefits of a left knee steroid injection today in the office. Patient expressed understanding and wishes to proceed. Patient tolerted the injection well and without any complications.     Home exercises given today and will continue over the counter medications for pain control.      Call or return if worsening symptoms.    Follow Up     6 - 8 weeks     Patient was given instructions and counseling regarding her condition or for health maintenance advice. Please see specific information pulled into the AVS if appropriate.     Scribed for Jamison Noyola MD by Corie Barboza.  04/17/25   14:53 EDT    I have personally performed the services described in this document as scribed by the above individual and it is both accurate and complete. Jamison Noyola MD 04/20/25

## 2025-04-20 RX ORDER — TRIAMCINOLONE ACETONIDE 40 MG/ML
40 INJECTION, SUSPENSION INTRA-ARTICULAR; INTRAMUSCULAR
Status: COMPLETED | OUTPATIENT
Start: 2025-04-17 | End: 2025-04-17

## 2025-04-20 RX ORDER — LIDOCAINE HYDROCHLORIDE 10 MG/ML
5 INJECTION, SOLUTION INFILTRATION; PERINEURAL
Status: COMPLETED | OUTPATIENT
Start: 2025-04-17 | End: 2025-04-17

## 2025-04-28 NOTE — PROGRESS NOTES
Chief Complaint  Diabetes (Patient is wanting to discuss the Mounjaro. /Patient is concerned due to her having sleep apnea. She was told by another doctor that it could help the sleep apnea, but has to be at 10 or 15 mg. She would like to discuss increasing it. )    Debbie Apple presents to [facility]  History of Present Illness    History of Present Illness    Diabetes  She presents for her follow-up diabetic visit. She has type 2 diabetes mellitus. Hypoglycemia symptoms include nervousness/anxiousness. Pertinent negatives for hypoglycemia include no confusion, dizziness, headaches, hunger, mood changes, pallor, seizures, sleepiness, speech difficulty, sweats or tremors. There are no diabetic associated symptoms. Pertinent negatives for diabetes include no blurred vision, no chest pain, no fatigue, no foot paresthesias, no foot ulcerations, no polydipsia, no polyphagia, no polyuria, no visual change, no weakness and no weight loss. There are no hypoglycemic complications. Symptoms are stable. There are no diabetic complications. Pertinent negatives for diabetic complications include no CVA, impotence or PVD. Risk factors for coronary artery disease include dyslipidemia, family history, obesity, hypertension, stress and post-menopausal.   Current Outpatient Medications   Medication Instructions    amoxicillin-clavulanate (AUGMENTIN) 875-125 MG per tablet 1 tablet, Oral, 2 Times Daily    Blood Glucose Monitoring Suppl (FreeStyle Lite) w/Device kit     Collagen-Vitamin C-Biotin (COLLAGEN PO) Daily    CRANBERRY PO Take  by mouth.    Diclofenac Sodium (VOLTAREN) 4 g, Topical, 4 Times Daily PRN    EPINEPHrine (EPIPEN) 0.3 mg, Intramuscular, Once As Needed    fexofenadine (ALLEGRA) 180 mg, Nightly    glucose blood test strip Daily in the AM, fasting    glucose monitor monitoring kit 1 each, Not Applicable, Daily    Lancets (accu-chek multiclix) lancets  "Use one daily in the AM, fasting    losartan-hydrochlorothiazide (HYZAAR) 50-12.5 MG per tablet 1 tablet, Oral, Daily    metFORMIN (GLUCOPHAGE) 500 mg, Oral, 2 Times Daily With Meals    minoxidil (LONITEN) 10 mg, Oral, Daily    multivitamin (MULTIVITAMIN PO) Daily    Omega-3 Fatty Acids (FISH OIL PO) Daily    Probiotic Product (PROBIOTIC PO) 1 each, Daily    rosuvastatin (CRESTOR) 5 mg, Oral, Daily    Tirzepatide 7.5 mg, Subcutaneous, Weekly       The following portions of the patient's history were reviewed and updated as appropriate: allergies, current medications, past family history, past medical history, past social history, past surgical history, and problem list.    Objective   Vital Signs:   /90 (BP Location: Right arm, Patient Position: Sitting, Cuff Size: Adult)   Pulse 83   Temp 98 °F (36.7 °C) (Temporal)   Ht 162.6 cm (64\")   Wt 85.3 kg (188 lb)   SpO2 95%   BMI 32.27 kg/m²     BP Readings from Last 3 Encounters:   05/02/25 128/90   04/28/25 125/71   04/14/25 120/83     Wt Readings from Last 3 Encounters:   05/02/25 85.3 kg (188 lb)   04/28/25 86.4 kg (190 lb 8 oz)   04/17/25 84.8 kg (187 lb)           Physical Exam     Appearance: No acute distress, well-nourished  Head: normocephalic, atraumatic  Eyes: extraocular movements intact, no scleral icterus, no conjunctival injection  Ears, Nose, and Throat: external ears normal, nares patent, moist mucous membranes  Cardiovascular: regular rate and rhythm. no murmurs, rubs, or gallops. no edema  Respiratory: breathing comfortably, symmetric chest rise, clear to auscultation bilaterally. No wheezes, rales, or rhonchi.  Neuro: alert and oriented to time, place, and person. Normal gait  Psych: normal mood and affect     Physical Exam        Result Review :   The following data was reviewed by: KASSANDRA Wilder on 05/02/2025:  Common labs          9/5/2024    12:39 12/5/2024    12:37 3/6/2025    13:01   Common Labs   Glucose 89  90  88  "   BUN 17  20  15    Creatinine 0.96  1.11  1.00    Sodium 139  136  141    Potassium 3.7  3.7  4.6    Chloride 100  97  99    Calcium 10.0  9.6  10.2    Albumin 4.6  4.3  4.0    Total Bilirubin 0.4  0.4  0.5    Alkaline Phosphatase 71  66  72    AST (SGOT) 21  25  27    ALT (SGPT) 25  23  28    WBC 6.87  6.88  6.63    Hemoglobin 13.6  14.3  14.1    Hematocrit 42.5  43.1  42.8    Platelets 278  326  292    Total Cholesterol 165  179  149    Triglycerides 182  289  107    HDL Cholesterol 53  49  59    LDL Cholesterol  81  83  71    Hemoglobin A1C 6.00  5.90  6.10        Results           Lab Results   Component Value Date    SARSANTIGEN Not Detected 09/27/2023    COVID19 Not Detected 09/27/2023    RAPFLUA Negative 05/09/2022    RAPFLUB Negative 05/09/2022    FLUAAG Not Detected 09/27/2023    FLUBAG Not Detected 09/27/2023    RAPSCRN Negative 04/12/2023    INR 1.0 02/27/2023    BILIRUBINUR Negative 09/16/2024            Assessment and Plan    Diagnoses and all orders for this visit:    1. Type 2 diabetes mellitus with hyperglycemia, without long-term current use of insulin (Primary)  -     Cancel: Microalbumin / Creatinine Urine Ratio - Urine, Clean Catch  -     Tirzepatide 7.5 MG/0.5ML solution auto-injector; Inject 7.5 mg under the skin into the appropriate area as directed 1 (One) Time Per Week.  Dispense: 2 mL; Refill: 2    2. Encounter for screening mammogram for malignant neoplasm of breast  -     Mammo Screening Digital Tomosynthesis Bilateral With CAD; Future    3. Screening for colon cancer  -     Cologuard - Stool, Per Rectum; Future      - discussed patho of T2DM and how medications must be used to treat A1C and they are prescribed differently when Rx'd for a non-diabetic with CAIO (ZEPBOUND vs Mounjaro)   - will trail increase to 7.5. pt instructed to monitor for signs of hypoglycemia   Assessment & Plan      Medications Discontinued During This Encounter   Medication Reason    Tirzepatide 5 MG/0.5ML  solution auto-injector           Follow Up   No follow-ups on file.  Patient was given instructions and counseling regarding her condition or for health maintenance advice. Please see specific information pulled into the AVS if appropriate.       KASSANDRA Wilder  05/04/25  14:09 EDT      Patient or patient representative verbalized consent for the use of Ambient Listening during the visit with  KASSANDRA Wilder for chart documentation. 5/4/2025  12:22 EDT

## 2025-05-02 ENCOUNTER — OFFICE VISIT (OUTPATIENT)
Dept: INTERNAL MEDICINE | Facility: CLINIC | Age: 55
End: 2025-05-02
Payer: COMMERCIAL

## 2025-05-02 VITALS
BODY MASS INDEX: 32.1 KG/M2 | WEIGHT: 188 LBS | TEMPERATURE: 98 F | OXYGEN SATURATION: 95 % | DIASTOLIC BLOOD PRESSURE: 90 MMHG | HEIGHT: 64 IN | HEART RATE: 83 BPM | SYSTOLIC BLOOD PRESSURE: 128 MMHG

## 2025-05-02 DIAGNOSIS — E11.65 TYPE 2 DIABETES MELLITUS WITH HYPERGLYCEMIA, WITHOUT LONG-TERM CURRENT USE OF INSULIN: Primary | ICD-10-CM

## 2025-05-02 DIAGNOSIS — Z12.11 SCREENING FOR COLON CANCER: ICD-10-CM

## 2025-05-02 DIAGNOSIS — Z12.31 ENCOUNTER FOR SCREENING MAMMOGRAM FOR MALIGNANT NEOPLASM OF BREAST: ICD-10-CM

## 2025-05-08 ENCOUNTER — TELEPHONE (OUTPATIENT)
Dept: INTERNAL MEDICINE | Facility: CLINIC | Age: 55
End: 2025-05-08

## 2025-05-08 NOTE — TELEPHONE ENCOUNTER
Caller: Holly     Relationship: Self     Best call back number: [phone]     Who are you requesting to speak with : PCP and MA     What was the call regarding:     Holly called stating her  has sent a fax regarding her pet living with her. Stated she has been waiting over 2 weeks.    Checked with clinical to see if we received any paper work, there was no record.    Spoke to Isa in regards to patient's concerns, was notified primary care cannot sign off on emotional support or service animal and they must be registered online or signed off by Psychiatry/ Behavioral Health Specialist.    Relayed information to patient. Patient verbalized understanding.

## 2025-05-20 ENCOUNTER — CLINICAL SUPPORT (OUTPATIENT)
Dept: INTERNAL MEDICINE | Facility: CLINIC | Age: 55
End: 2025-05-20
Payer: COMMERCIAL

## 2025-05-20 DIAGNOSIS — E78.2 MIXED HYPERLIPIDEMIA: ICD-10-CM

## 2025-05-20 DIAGNOSIS — N89.8 VAGINAL ITCHING: Primary | ICD-10-CM

## 2025-05-20 LAB
BACTERIAL VAGINOSIS VAG-IMP: NEGATIVE
CANDIDA DNA VAG QL NAA+PROBE: NOT DETECTED
CANDIDA DNA VAG QL NAA+PROBE: NOT DETECTED
T VAGINALIS DNA VAG QL NAA+PROBE: NOT DETECTED

## 2025-05-20 PROCEDURE — 81515 NFCT DS BV&VAGINITIS DNA ALG: CPT | Performed by: NURSE PRACTITIONER

## 2025-05-20 RX ORDER — ROSUVASTATIN CALCIUM 5 MG/1
5 TABLET, COATED ORAL DAILY
Qty: 90 TABLET | Refills: 1 | Status: SHIPPED | OUTPATIENT
Start: 2025-05-20

## 2025-05-20 NOTE — PROGRESS NOTES
PATIENT CAME IN OFFICE FOR VAG SWAB IN [facility] OF [facility], SENT TO LAB. INFORMED PATIENT WE WOULD NOTIFY HER OF RESULTS ONCE THEY ARE BACK.

## 2025-06-06 ENCOUNTER — TELEPHONE (OUTPATIENT)
Dept: SLEEP MEDICINE | Facility: HOSPITAL | Age: 55
End: 2025-06-06
Payer: COMMERCIAL

## 2025-06-06 ENCOUNTER — OFFICE VISIT (OUTPATIENT)
Dept: INTERNAL MEDICINE | Facility: CLINIC | Age: 55
End: 2025-06-06
Payer: COMMERCIAL

## 2025-06-06 VITALS
TEMPERATURE: 97.5 F | WEIGHT: 185 LBS | SYSTOLIC BLOOD PRESSURE: 124 MMHG | OXYGEN SATURATION: 95 % | HEIGHT: 64 IN | BODY MASS INDEX: 31.58 KG/M2 | HEART RATE: 77 BPM | DIASTOLIC BLOOD PRESSURE: 84 MMHG

## 2025-06-06 DIAGNOSIS — J30.89 NON-SEASONAL ALLERGIC RHINITIS DUE TO OTHER ALLERGIC TRIGGER: ICD-10-CM

## 2025-06-06 DIAGNOSIS — F17.211 CIGARETTE NICOTINE DEPENDENCE IN REMISSION: ICD-10-CM

## 2025-06-06 DIAGNOSIS — E11.65 TYPE 2 DIABETES MELLITUS WITH HYPERGLYCEMIA, WITHOUT LONG-TERM CURRENT USE OF INSULIN: ICD-10-CM

## 2025-06-06 DIAGNOSIS — E66.811 CLASS 1 OBESITY WITH SERIOUS COMORBIDITY AND BODY MASS INDEX (BMI) OF 31.0 TO 31.9 IN ADULT, UNSPECIFIED OBESITY TYPE: ICD-10-CM

## 2025-06-06 DIAGNOSIS — I10 ESSENTIAL HYPERTENSION: ICD-10-CM

## 2025-06-06 DIAGNOSIS — E78.2 MIXED HYPERLIPIDEMIA: Primary | ICD-10-CM

## 2025-06-06 DIAGNOSIS — E55.9 VITAMIN D DEFICIENCY: ICD-10-CM

## 2025-06-06 LAB
ALBUMIN SERPL-MCNC: 4.3 G/DL (ref 3.5–5.2)
ALBUMIN UR-MCNC: <1.2 MG/DL
ALBUMIN/GLOB SERPL: 1.5 G/DL
ALP SERPL-CCNC: 57 U/L (ref 39–117)
ALT SERPL W P-5'-P-CCNC: 26 U/L (ref 1–33)
ANION GAP SERPL CALCULATED.3IONS-SCNC: 12 MMOL/L (ref 5–15)
AST SERPL-CCNC: 26 U/L (ref 1–32)
BASOPHILS # BLD AUTO: 0.04 10*3/MM3 (ref 0–0.2)
BASOPHILS NFR BLD AUTO: 0.7 % (ref 0–1.5)
BILIRUB SERPL-MCNC: 0.3 MG/DL (ref 0–1.2)
BUN SERPL-MCNC: 10 MG/DL (ref 6–20)
BUN/CREAT SERPL: 9.6 (ref 7–25)
CALCIUM SPEC-SCNC: 9.7 MG/DL (ref 8.6–10.5)
CHLORIDE SERPL-SCNC: 99 MMOL/L (ref 98–107)
CHOLEST SERPL-MCNC: 188 MG/DL (ref 0–200)
CO2 SERPL-SCNC: 29 MMOL/L (ref 22–29)
CREAT SERPL-MCNC: 1.04 MG/DL (ref 0.57–1)
CREAT UR-MCNC: 202.5 MG/DL
DEPRECATED RDW RBC AUTO: 40.4 FL (ref 37–54)
EGFRCR SERPLBLD CKD-EPI 2021: 63.2 ML/MIN/1.73
EOSINOPHIL # BLD AUTO: 0.14 10*3/MM3 (ref 0–0.4)
EOSINOPHIL NFR BLD AUTO: 2.4 % (ref 0.3–6.2)
ERYTHROCYTE [DISTWIDTH] IN BLOOD BY AUTOMATED COUNT: 13.6 % (ref 12.3–15.4)
GLOBULIN UR ELPH-MCNC: 2.9 GM/DL
GLUCOSE SERPL-MCNC: 98 MG/DL (ref 65–99)
HBA1C MFR BLD: 6 % (ref 4.8–5.6)
HCT VFR BLD AUTO: 41.7 % (ref 34–46.6)
HDLC SERPL-MCNC: 58 MG/DL (ref 40–60)
HGB BLD-MCNC: 13.4 G/DL (ref 12–15.9)
IMM GRANULOCYTES # BLD AUTO: 0.02 10*3/MM3 (ref 0–0.05)
IMM GRANULOCYTES NFR BLD AUTO: 0.3 % (ref 0–0.5)
LDLC SERPL CALC-MCNC: 102 MG/DL (ref 0–100)
LDLC/HDLC SERPL: 1.68 {RATIO}
LYMPHOCYTES # BLD AUTO: 2.38 10*3/MM3 (ref 0.7–3.1)
LYMPHOCYTES NFR BLD AUTO: 41.5 % (ref 19.6–45.3)
MCH RBC QN AUTO: 26 PG (ref 26.6–33)
MCHC RBC AUTO-ENTMCNC: 32.1 G/DL (ref 31.5–35.7)
MCV RBC AUTO: 81 FL (ref 79–97)
MICROALBUMIN/CREAT UR: NORMAL MG/G{CREAT}
MONOCYTES # BLD AUTO: 0.49 10*3/MM3 (ref 0.1–0.9)
MONOCYTES NFR BLD AUTO: 8.6 % (ref 5–12)
NEUTROPHILS NFR BLD AUTO: 2.66 10*3/MM3 (ref 1.7–7)
NEUTROPHILS NFR BLD AUTO: 46.5 % (ref 42.7–76)
NRBC BLD AUTO-RTO: 0 /100 WBC (ref 0–0.2)
PLATELET # BLD AUTO: 290 10*3/MM3 (ref 140–450)
PMV BLD AUTO: 8.9 FL (ref 6–12)
POTASSIUM SERPL-SCNC: 3.9 MMOL/L (ref 3.5–5.2)
PROT SERPL-MCNC: 7.2 G/DL (ref 6–8.5)
RBC # BLD AUTO: 5.15 10*6/MM3 (ref 3.77–5.28)
SODIUM SERPL-SCNC: 140 MMOL/L (ref 136–145)
TRIGL SERPL-MCNC: 164 MG/DL (ref 0–150)
VLDLC SERPL-MCNC: 28 MG/DL (ref 5–40)
WBC NRBC COR # BLD AUTO: 5.73 10*3/MM3 (ref 3.4–10.8)

## 2025-06-06 PROCEDURE — 82570 ASSAY OF URINE CREATININE: CPT | Performed by: NURSE PRACTITIONER

## 2025-06-06 PROCEDURE — 85025 COMPLETE CBC W/AUTO DIFF WBC: CPT | Performed by: NURSE PRACTITIONER

## 2025-06-06 PROCEDURE — 80061 LIPID PANEL: CPT | Performed by: NURSE PRACTITIONER

## 2025-06-06 PROCEDURE — 82043 UR ALBUMIN QUANTITATIVE: CPT | Performed by: NURSE PRACTITIONER

## 2025-06-06 PROCEDURE — 3079F DIAST BP 80-89 MM HG: CPT | Performed by: NURSE PRACTITIONER

## 2025-06-06 PROCEDURE — 83036 HEMOGLOBIN GLYCOSYLATED A1C: CPT | Performed by: NURSE PRACTITIONER

## 2025-06-06 PROCEDURE — 99214 OFFICE O/P EST MOD 30 MIN: CPT | Performed by: NURSE PRACTITIONER

## 2025-06-06 PROCEDURE — 3074F SYST BP LT 130 MM HG: CPT | Performed by: NURSE PRACTITIONER

## 2025-06-06 PROCEDURE — 80053 COMPREHEN METABOLIC PANEL: CPT | Performed by: NURSE PRACTITIONER

## 2025-06-06 PROCEDURE — 1125F AMNT PAIN NOTED PAIN PRSNT: CPT | Performed by: NURSE PRACTITIONER

## 2025-06-06 PROCEDURE — 3044F HG A1C LEVEL LT 7.0%: CPT | Performed by: NURSE PRACTITIONER

## 2025-06-06 NOTE — TELEPHONE ENCOUNTER
Attempted to call pt,she needs to return cpap machine an cords to [facility],she  is to  keep the rest of supplies. All orders have been sent to [facility],per patient request.

## 2025-06-06 NOTE — PROGRESS NOTES
Chief Complaint  Diabetes (3 month follow-up), Hypertension (3 month follow-up/Patient feels like she can not take deep breaths and feels like it can be related to her blood pressure medication ), and Hyperlipidemia (3 month follow-up/)    Subjective          Holly Apple presents to [facility]  History of Present Illness    Mammogram scheduled 07/01/2025  Cologuard has been ordered, patient has received the kit and plans to complete.     History of Present Illness  The patient presents for weight management.    She reports no further weight loss since her last visit. Her medication dosage was increased from 5 mg to 7.5 mg due to sleep apnea. She believes the current dosage is effective and does not require any refills at this time. Efforts have been made to maintain a balanced diet, including consuming banana pudding and broccoli. Additionally, she mentions frequenting [employer] for meals and trying to incorporate extra protein into her diet.    Hyperlipidemia  This is a chronic problem. Exacerbating diseases include diabetes and obesity. She has no history of chronic renal disease, hypothyroidism, liver disease or nephrotic syndrome. Pertinent negatives include no chest pain, focal sensory loss, focal weakness, leg pain, myalgias or shortness of breath.     Hypertension  This is a chronic problem. Associated symptoms include anxiety. Pertinent negatives include no blurred vision, chest pain, headaches, malaise/fatigue, neck pain, orthopnea, palpitations, peripheral edema, PND, shortness of breath or sweats. Compliance problems include diet and exercise.  There is no history of angina, kidney disease, CAD/MI, CVA, heart failure, left ventricular hypertrophy or PVD. There is no history of chronic renal disease.     Anxiety  Presents for follow-up visit. Symptoms include decreased concentration, depressed mood, irritability and nervous/anxious behavior. Patient reports  no chest pain, compulsions, confusion, dizziness, dry mouth, excessive worry, feeling of choking, hyperventilation, impotence, insomnia, malaise, muscle tension, nausea, obsessions, palpitations, panic, restlessness, shortness of breath or suicidal ideas.       Obesity  This is a chronic problem. Pertinent negatives include no abdominal pain, anorexia, arthralgias, change in bowel habit, chest pain, chills, congestion, coughing, diaphoresis, fatigue, fever, headaches, joint swelling, myalgias, nausea, neck pain, numbness, rash, sore throat, swollen glands, urinary symptoms, vertigo, visual change, vomiting or weakness.     Diabetes  She presents for her follow-up diabetic visit. She has type 2 diabetes mellitus. Hypoglycemia symptoms include nervousness/anxiousness. Pertinent negatives for hypoglycemia include no confusion, dizziness, headaches, hunger, mood changes, pallor, seizures, sleepiness, speech difficulty, sweats or tremors. There are no diabetic associated symptoms. Pertinent negatives for diabetes include no blurred vision, no chest pain, no fatigue, no foot paresthesias, no foot ulcerations, no polydipsia, no polyphagia, no polyuria, no visual change, no weakness and no weight loss. There are no hypoglycemic complications. Symptoms are stable. There are no diabetic complications. Pertinent negatives for diabetic complications include no CVA, impotence or PVD. Risk factors for coronary artery disease include dyslipidemia, family history, obesity, hypertension, stress and post-menopausal.   Current Outpatient Medications   Medication Instructions    Blood Glucose Monitoring Suppl (FreeStyle Lite) w/Device kit     Collagen-Vitamin C-Biotin (COLLAGEN PO) Daily    CRANBERRY PO Take  by mouth.    Diclofenac Sodium (VOLTAREN) 4 g, Topical, 4 Times Daily PRN    EPINEPHrine (EPIPEN) 0.3 mg, Intramuscular, Once As Needed    fexofenadine (ALLEGRA) 180 mg, Nightly    glucose blood test strip Daily in the AM, fasting  "   glucose monitor monitoring kit 1 each, Not Applicable, Daily    Lancets (accu-chek multiclix) lancets Use one daily in the AM, fasting    losartan-hydrochlorothiazide (HYZAAR) 50-12.5 MG per tablet 1 tablet, Oral, Daily    metFORMIN (GLUCOPHAGE) 500 mg, Oral, 2 Times Daily With Meals    minoxidil (LONITEN) 10 mg, Oral, Daily    multivitamin (MULTIVITAMIN PO) Daily    Omega-3 Fatty Acids (FISH OIL PO) Daily    Probiotic Product (PROBIOTIC PO) 1 each, Daily    rosuvastatin (CRESTOR) 10 mg, Oral, Nightly    Tirzepatide 7.5 mg, Subcutaneous, Weekly       The following portions of the patient's history were reviewed and updated as appropriate: allergies, current medications, past family history, past medical history, past social history, past surgical history, and problem list.    Objective   Vital Signs:   /84 (BP Location: Left arm, Patient Position: Sitting, Cuff Size: Adult)   Pulse 77   Temp 97.5 °F (36.4 °C) (Temporal)   Ht 162.6 cm (64\")   Wt 83.9 kg (185 lb)   SpO2 95%   BMI 31.76 kg/m²     BP Readings from Last 3 Encounters:   06/09/25 108/49   06/06/25 124/84   05/02/25 128/90     Wt Readings from Last 3 Encounters:   06/09/25 83.8 kg (184 lb 11.2 oz)   06/06/25 83.9 kg (185 lb)   05/02/25 85.3 kg (188 lb)           Physical Exam     Appearance: No acute distress, well-nourished  Head: normocephalic, atraumatic  Eyes: extraocular movements intact, no scleral icterus, no conjunctival injection  Ears, Nose, and Throat: external ears normal, nares patent, moist mucous membranes  Cardiovascular: regular rate and rhythm. no murmurs, rubs, or gallops. no edema  Respiratory: breathing comfortably, symmetric chest rise, clear to auscultation bilaterally. No wheezes, rales, or rhonchi.  Neuro: alert and oriented to time, place, and person. Normal gait  Psych: normal mood and affect     Physical Exam  Respiratory: Clear to auscultation, no wheezing, rales or rhonchi      Result Review :   The following " data was reviewed by: KASSANDRA Wilder on 06/06/2025:  Common labs          12/5/2024    12:37 3/6/2025    13:01 6/6/2025    12:12   Common Labs   Glucose 90  88  98    BUN 20  15  10.0    Creatinine 1.11  1.00  1.04    Sodium 136  141  140    Potassium 3.7  4.6  3.9    Chloride 97  99  99    Calcium 9.6  10.2  9.7    Albumin 4.3  4.0  4.3    Total Bilirubin 0.4  0.5  0.3    Alkaline Phosphatase 66  72  57    AST (SGOT) 25  27  26    ALT (SGPT) 23  28  26    WBC 6.88  6.63  5.73    Hemoglobin 14.3  14.1  13.4    Hematocrit 43.1  42.8  41.7    Platelets 326  292  290    Total Cholesterol 179  149  188    Triglycerides 289  107  164    HDL Cholesterol 49  59  58    LDL Cholesterol  83  71  102    Hemoglobin A1C 5.90  6.10  6.00    Microalbumin, Urine   <1.2        Results           Lab Results   Component Value Date    SARSANTIGEN Not Detected 09/27/2023    COVID19 Not Detected 09/27/2023    RAPFLUA Negative 05/09/2022    RAPFLUB Negative 05/09/2022    FLUAAG Not Detected 09/27/2023    FLUBAG Not Detected 09/27/2023    RAPSCRN Negative 04/12/2023    INR 1.0 02/27/2023    BILIRUBINUR Negative 09/16/2024            Assessment and Plan    Diagnoses and all orders for this visit:    1. Mixed hyperlipidemia (Primary)  Assessment & Plan:      Lipid abnormalities : will recheck today      Plan:  Continue same medication/s without change.       Discussed medication dosage, use, side effects, and goals of treatment in detail.    Counseled patient on lifestyle modifications to help control hyperlipidemia.   Advised patient to exercise for 150 minutes weekly. (30 minute brisk walk, 5 days a week for example)  Weight Loss encouraged     Patient Treatment Goals:   LDL goal is less than 55     Followup in 3 months.       Orders:  -     CBC w AUTO Differential  -     Comprehensive metabolic panel  -     Lipid panel    2. Essential hypertension  Assessment & Plan:  Hypertension is stable and controlled  Continue current  treatment regimen.  Dietary sodium restriction.  Weight loss.  Regular aerobic exercise.  Ambulatory blood pressure monitoring.  Blood pressure will be reassessed in 3 months.    Orders:  -     CBC w AUTO Differential  -     Comprehensive metabolic panel  -     Lipid panel    3. Type 2 diabetes mellitus with hyperglycemia, without long-term current use of insulin  Assessment & Plan:  Diabetes is  will rehceck A1C today  .   Continue current treatment regimen.  Recommended an ADA diet.  Recommended a Mediterranean style of eating  Regular aerobic exercise.  Discussed foot care.  Diabetes will be reassessed in 3 months    Orders:  -     Hemoglobin A1c  -     Microalbumin / Creatinine Urine Ratio - Urine, Clean Catch  -     Tirzepatide 7.5 MG/0.5ML solution auto-injector; Inject 7.5 mg under the skin into the appropriate area as directed 1 (One) Time Per Week.  Dispense: 2 mL; Refill: 2    4. Non-seasonal allergic rhinitis due to other allergic trigger    5. Class 1 obesity with serious comorbidity and body mass index (BMI) of 31.0 to 31.9 in adult, unspecified obesity type    6. Vitamin D deficiency    7. Cigarette nicotine dependence in remission        Assessment & Plan  1. Weight management.  - Weight loss of 4 pounds over the past month.  - Blood pressure readings are within the normal range.  - Currently on a 7.5 mg dose of medication, increased from 5.0 mg due to sleep apnea.  - Encouraged to continue current dietary habits and medication regimen.    Medications Discontinued During This Encounter   Medication Reason    Tirzepatide 7.5 MG/0.5ML solution auto-injector Reorder          Follow Up   Return in about 3 months (around 9/6/2025).  Patient was given instructions and counseling regarding her condition or for health maintenance advice. Please see specific information pulled into the AVS if appropriate.       Isa Wolf, KASSANDRA  06/13/25  12:59 EDT      Patient or patient representative verbalized  consent for the use of Ambient Listening during the visit with  KASSANDRA Wilder for chart documentation. 6/13/2025  11:59 EDT

## 2025-06-06 NOTE — TELEPHONE ENCOUNTER
Pt called today and is very unhappy with the service she is receiving from her DME, [facility].    Pt has decided that she would like to return her machine and receive a new one from [facility].   I did call Caitie at [facility] and  we did discuss the pt and her machine.  She did ask to call pt as she can see that she has a large leak.  Pt has decided to go with a different DME company.

## 2025-06-09 ENCOUNTER — OFFICE VISIT (OUTPATIENT)
Dept: SLEEP MEDICINE | Facility: HOSPITAL | Age: 55
End: 2025-06-09
Payer: COMMERCIAL

## 2025-06-09 VITALS
HEART RATE: 92 BPM | OXYGEN SATURATION: 100 % | SYSTOLIC BLOOD PRESSURE: 108 MMHG | BODY MASS INDEX: 31.53 KG/M2 | HEIGHT: 64 IN | DIASTOLIC BLOOD PRESSURE: 49 MMHG | WEIGHT: 184.7 LBS

## 2025-06-09 DIAGNOSIS — G47.33 OSA (OBSTRUCTIVE SLEEP APNEA): Primary | ICD-10-CM

## 2025-06-09 PROCEDURE — 3074F SYST BP LT 130 MM HG: CPT | Performed by: STUDENT IN AN ORGANIZED HEALTH CARE EDUCATION/TRAINING PROGRAM

## 2025-06-09 PROCEDURE — 1159F MED LIST DOCD IN RCRD: CPT | Performed by: STUDENT IN AN ORGANIZED HEALTH CARE EDUCATION/TRAINING PROGRAM

## 2025-06-09 PROCEDURE — G0463 HOSPITAL OUTPT CLINIC VISIT: HCPCS

## 2025-06-09 PROCEDURE — 1160F RVW MEDS BY RX/DR IN RCRD: CPT | Performed by: STUDENT IN AN ORGANIZED HEALTH CARE EDUCATION/TRAINING PROGRAM

## 2025-06-09 PROCEDURE — 3078F DIAST BP <80 MM HG: CPT | Performed by: STUDENT IN AN ORGANIZED HEALTH CARE EDUCATION/TRAINING PROGRAM

## 2025-06-09 PROCEDURE — 99213 OFFICE O/P EST LOW 20 MIN: CPT | Performed by: STUDENT IN AN ORGANIZED HEALTH CARE EDUCATION/TRAINING PROGRAM

## 2025-06-09 NOTE — PROGRESS NOTES
[facility]    SLEEP CLINIC FOLLOW UP PROGRESS NOTE.    Holly Apple  [id number]   1970  54 y.o.  female      PCP: Isa Wolf APRN    Date of visit: 2025    No chief complaint on file.      HPI:  This is a 54 y.o. years old patient is here for the management of obstructive sleep apnea.    She had loud snoring and witnessed apneas. She did not have a problem with daytime sleepiness.   Last sleep apnea testing was on 2025 with monitoring time of 270 minutes which showed Moderate obstructive sleep apnea with an AHI of 29.1/h and lowest oxygen saturation 71%.  There was 20.2 minutes of time with oxygen saturation less than 90%.  She started on CPAP since last visit.  She had some problems with her first DME company and customer service so she returned the CPAP machine to that DME company and is now in the process of getting a CPAP through new DME company.  She reports she did have improvement in sleep while she was on CPAP.  She did have some mask troubles and tried a few different masks.  She was also having some excessive mask leak.  She is still on Mounjaro at this time.  She goes to bed between 230am and 3 AM and she gets up at 10:30 AM to 1:30 PM and she wakes up a few times per night to use the restroom or was CPAP related.   She has nasal congestion, dry mouth, postnasal drip, anxiety.    ESS: 3    PAP download data dates May 3 through 2025  Device: AirSense 10 AutoSet  Mask type: Fullface  Pressure : 6-18  % days used >4 hours: 73  Average usage days used: 4 hours 28 minutes  AHI: 9  Median leak: 39.6  95th % leak 82.7  DME supplier: Previously [employer], switching to [facility]    SOCIAL (habits pertaining to sleep medicine)  History tobacco use: None  History of alcohol use: Typically 0 per week  Caffeine use: 6 drinks per day  OB History          2    Para   2    Term   2       0    AB   0    Living   2         SAB   0    IAB   0    Ectopic  "  0    Molar   0    Multiple   0    Live Births   2                REVIEW OF SYSTEMS:   Pertaining positive symptoms are:  See scanned document      PHYSICAL EXAMINATION:  CONSTITUTIONAL:  Vitals:    06/09/25 1500   BP: 108/49   Pulse: 92   SpO2: 100%   Weight: 83.8 kg (184 lb 11.2 oz)   Height: 162.6 cm (64.02\")    Body mass index is 31.69 kg/m².   RESP SYSTEM: No respiratory distress  CARDIOVASULAR: Regular rate  NEURO: Answers questions appropriately        She is switching DME companies      ASSESSMENT AND PLAN:  Diagnoses and all orders for this visit:    1. CAIO (obstructive sleep apnea) (Primary)  -     Cancel: PAP Therapy  -     PAP Therapy    Discussed sleep study results of borderline severe CAIO.  Recommend treatment with CPAP.  She is in process of getting new CPAP machine from different DME company.   She was assisted with a mask fitting today as she had high leak on download data.   Follow up in 10 weeks to review download data.     I have independently reviewed the PAP compliance data and discussed with the patient the download data and encouarged the patient to continue to use the device. The device is benefiting the patient and the device is medically necessary.  The patient is also instructed to get the supplies from the DME company and and change them on a regular basis.  A prescription for supplies has been sent to the DME company.     Return in about 10 weeks (around 8/18/2025). . Patient's questions were answered.      Alberto Roa, DO              "

## 2025-06-12 ENCOUNTER — RESULTS FOLLOW-UP (OUTPATIENT)
Dept: INTERNAL MEDICINE | Facility: CLINIC | Age: 55
End: 2025-06-12
Payer: COMMERCIAL

## 2025-06-12 DIAGNOSIS — E78.2 MIXED HYPERLIPIDEMIA: Primary | ICD-10-CM

## 2025-06-12 RX ORDER — ROSUVASTATIN CALCIUM 10 MG/1
10 TABLET, COATED ORAL NIGHTLY
Qty: 90 TABLET | Refills: 1 | Status: SHIPPED | OUTPATIENT
Start: 2025-06-12

## 2025-06-12 NOTE — TELEPHONE ENCOUNTER
Spoke with patient. Verified .     Made aware of results -   Triglycerides are elevated.  This is our body storage form of sugar.  Recommend low carb and sugar intake     LDL, bad cholesterol, still not at goal.  I would like to increase her Crestor to 10 mg if she is agreeable?     A1c 6.0.  Well-controlled.  Continue current regimen      Patient verbalized understanding.   Patient is agreeable to increased Crestor.  Medication pended.

## 2025-06-13 NOTE — ASSESSMENT & PLAN NOTE
Lipid abnormalities : will recheck today      Plan:  Continue same medication/s without change.       Discussed medication dosage, use, side effects, and goals of treatment in detail.    Counseled patient on lifestyle modifications to help control hyperlipidemia.   Advised patient to exercise for 150 minutes weekly. (30 minute brisk walk, 5 days a week for example)  Weight Loss encouraged     Patient Treatment Goals:   LDL goal is less than 55     Followup in 3 months.

## 2025-07-01 ENCOUNTER — HOSPITAL ENCOUNTER (OUTPATIENT)
Dept: MAMMOGRAPHY | Facility: HOSPITAL | Age: 55
Discharge: HOME | End: 2025-07-01
Admitting: NURSE PRACTITIONER
Payer: COMMERCIAL

## 2025-07-01 DIAGNOSIS — Z12.31 ENCOUNTER FOR SCREENING MAMMOGRAM FOR MALIGNANT NEOPLASM OF BREAST: ICD-10-CM

## 2025-07-01 PROCEDURE — 77067 SCR MAMMO BI INCL CAD: CPT

## 2025-07-01 PROCEDURE — 77063 BREAST TOMOSYNTHESIS BI: CPT

## 2025-07-07 ENCOUNTER — RESULTS FOLLOW-UP (OUTPATIENT)
Dept: INTERNAL MEDICINE | Facility: CLINIC | Age: 55
End: 2025-07-07
Payer: COMMERCIAL

## 2025-07-07 NOTE — TELEPHONE ENCOUNTER
Spoke with patient. Verified .   Made aware of results -  Mammo reassuring. Repeat in one year. Please pend       Patient verbalized understanding.  Recall pended.

## 2025-07-24 ENCOUNTER — OFFICE VISIT (OUTPATIENT)
Dept: ORTHOPEDIC SURGERY | Facility: CLINIC | Age: 55
End: 2025-07-24
Payer: COMMERCIAL

## 2025-07-24 VITALS
SYSTOLIC BLOOD PRESSURE: 125 MMHG | OXYGEN SATURATION: 96 % | HEART RATE: 73 BPM | DIASTOLIC BLOOD PRESSURE: 85 MMHG | BODY MASS INDEX: 31.41 KG/M2 | HEIGHT: 64 IN | WEIGHT: 184 LBS

## 2025-07-24 DIAGNOSIS — M17.12 PRIMARY OSTEOARTHRITIS OF LEFT KNEE: Primary | ICD-10-CM

## 2025-07-24 RX ORDER — TRIAMCINOLONE ACETONIDE 40 MG/ML
40 INJECTION, SUSPENSION INTRA-ARTICULAR; INTRAMUSCULAR
Status: COMPLETED | OUTPATIENT
Start: 2025-07-24 | End: 2025-07-24

## 2025-07-24 RX ORDER — LIDOCAINE HYDROCHLORIDE 10 MG/ML
5 INJECTION, SOLUTION INFILTRATION; PERINEURAL
Status: COMPLETED | OUTPATIENT
Start: 2025-07-24 | End: 2025-07-24

## 2025-07-24 RX ADMIN — LIDOCAINE HYDROCHLORIDE 5 ML: 10 INJECTION, SOLUTION INFILTRATION; PERINEURAL at 15:45

## 2025-07-24 RX ADMIN — TRIAMCINOLONE ACETONIDE 40 MG: 40 INJECTION, SUSPENSION INTRA-ARTICULAR; INTRAMUSCULAR at 15:45

## 2025-07-24 NOTE — PROGRESS NOTES
Chief Complaint  Follow-up of the Left Knee    Subjective          History of Present Illness      Holly Apple is a 54 y.o. female     History of Present Illness  Patient presents for follow-up evaluation of left knee pain left knee osteoarthritis.  Patient has received 1 injection with some relief she was last seen in April for this.  Patient states that her knee pain is similar to past episodes she points anterior medial and lateral knee in her patellar region as her areas of pain she is requesting a left knee steroid injection.  She states she would also like to start therapy.        Allergies   Allergen Reactions    Diethyl Toluamide (Deet) Anaphylaxis    Fluconazole Swelling and Unknown - High Severity    Measles, Mumps & Rubella Vac Unknown - High Severity     Vaccine    Wasp Venom Anaphylaxis    Egg-Derived Products GI Intolerance    Ibuprofen GI Intolerance    Measles Virus Live Vaccine Other (See Comments)     DEVELOPED FEVER AS A CHILD    Peanut-Containing Drug Products Unknown - High Severity     STATES CAUSES THROAT TINGLING    Ciprofloxacin Rash        Social History     Socioeconomic History    Marital status:    Tobacco Use    Smoking status: Former     Current packs/day: 0.00     Average packs/day: 1 pack/day for 20.0 years (20.0 ttl pk-yrs)     Types: Cigarettes     Start date:      Quit date:      Years since quittin.5     Passive exposure: Past    Smokeless tobacco: Never    Tobacco comments:     QUIT APPROX 16 YRS   Vaping Use    Vaping status: Never Used   Substance and Sexual Activity    Alcohol use: Never    Drug use: Never    Sexual activity: Defer     Birth control/protection: Hysterectomy        REVIEW OF SYSTEMS    Constitutional: Awake alert and oriented x3, no acute distress, denies fevers, chills, weight loss  Respiratory: No respiratory distress  Vascular: Brisk cap refill, Intact distal pulses, No cyanosis, compartments soft with no signs or symptoms of  "compartment syndrome or DVT.   Cardiovascular: Denies chest pain, shortness of breath  Skin: Denies rashes, acute skin changes  Neurologic: Denies headache, loss of consciousness  MSK: Left knee pain      Objective   Vital Signs:   /85   Pulse 73   Ht 162.6 cm (64.02\")   Wt 83.5 kg (184 lb)   SpO2 96%   BMI 31.57 kg/m²     Body mass index is 31.57 kg/m².         Physical Exam  Left lower extremity: full extension, flexion  to 130 degrees, mild crepitus with range of motion, stable to anterior/posterior drawer ,  stable to varus/valgus stress, negative  Lachman's, pain with Stone's, pain with patella compression, distal neurovascularly intact, positive  pulses, calf soft, positive EHL, FHL, GS, and TA. Sensation intact to all 5 nerves of the foot.            Large Joint: L knee  Date/Time: 7/24/2025 3:45 PM  Consent given by: patient  Site marked: site marked  Timeout: Immediately prior to procedure a time out was called to verify the correct patient, procedure, equipment, support staff and site/side marked as required   Supporting Documentation  Indications: pain   Procedure Details  Location: knee - L knee  Preparation: Patient was prepped and draped in the usual sterile fashion  Needle gauge: 21g.  Medications administered: 5 mL lidocaine 1 %; 40 mg triamcinolone acetonide 40 MG/ML  Patient tolerance: patient tolerated the procedure well with no immediate complications      This injection documentation was Scribed for VALERIY Camargo by Alexus Roa MA.  07/24/25   15:46 EDT    Imaging Results (Most Recent)       None                     Assessment and Plan    Diagnoses and all orders for this visit:    1. Primary osteoarthritis of left knee (Primary)  -     Large Joint: L knee        Assessment & Plan  Discussed diagnosis and treatment options with patient she was given order for therapy she elected have left knee steroid injection.  A sterile prep of the injection site was " performed with chloraprep. Cryospray was used for local anesthesia. The site was injected. The patient tolerated the procedure well without complications. Post injection pain was discussed.    The risks, benefits, complications, treatment options/alternatives, and expected outcomes/goals were discussed with the patient.   Follow-up as needed      Call or return if worsening symptoms.    Follow Up   Return in about 3 months (around 10/24/2025) for Recheck.  Patient was given instructions and counseling regarding her condition or for health maintenance advice. Please see specific information pulled into the AVS if appropriate.       Contains text text generated by [employer]  EMR Dragon/Transcription disclaimer:  Part of this note may be an electronic transcription/translation of spoken language to printed text using the Dragon Dictation System

## 2025-08-11 ENCOUNTER — APPOINTMENT (OUTPATIENT)
Dept: CT IMAGING | Facility: HOSPITAL | Age: 55
End: 2025-08-11
Payer: COMMERCIAL

## 2025-08-11 ENCOUNTER — APPOINTMENT (OUTPATIENT)
Dept: GENERAL RADIOLOGY | Facility: HOSPITAL | Age: 55
End: 2025-08-11
Payer: COMMERCIAL

## 2025-08-11 ENCOUNTER — HOSPITAL ENCOUNTER (EMERGENCY)
Facility: HOSPITAL | Age: 55
Discharge: HOME | End: 2025-08-11
Attending: EMERGENCY MEDICINE | Admitting: EMERGENCY MEDICINE
Payer: COMMERCIAL

## 2025-08-11 VITALS
BODY MASS INDEX: 31.43 KG/M2 | SYSTOLIC BLOOD PRESSURE: 150 MMHG | DIASTOLIC BLOOD PRESSURE: 82 MMHG | HEIGHT: 64 IN | TEMPERATURE: 98.1 F | OXYGEN SATURATION: 98 % | HEART RATE: 80 BPM | WEIGHT: 184.08 LBS | RESPIRATION RATE: 16 BRPM
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
## 2025-08-11 DIAGNOSIS — M54.2 NECK PAIN: ICD-10-CM

## 2025-08-11 PROCEDURE — 96372 THER/PROPH/DIAG INJ SC/IM: CPT

## 2025-08-11 PROCEDURE — 72125 CT NECK SPINE W/O DYE: CPT

## 2025-08-11 PROCEDURE — 70450 CT HEAD/BRAIN W/O DYE: CPT

## 2025-08-11 PROCEDURE — 72072 X-RAY EXAM THORAC SPINE 3VWS: CPT

## 2025-08-11 PROCEDURE — 73030 X-RAY EXAM OF SHOULDER: CPT

## 2025-08-11 PROCEDURE — 99284 EMERGENCY DEPT VISIT MOD MDM: CPT

## 2025-08-11 PROCEDURE — 25010000002 KETOROLAC TROMETHAMINE PER 15 MG

## 2025-08-11 RX ORDER — KETOROLAC TROMETHAMINE 30 MG/ML
30 INJECTION, SOLUTION INTRAMUSCULAR; INTRAVENOUS ONCE
Status: COMPLETED | OUTPATIENT
Start: 2025-08-11 | End: 2025-08-11

## 2025-08-11 RX ORDER — LIDOCAINE 4 G/G
1 PATCH TOPICAL ONCE
Status: DISCONTINUED | OUTPATIENT
Start: 2025-08-11 | End: 2025-08-12 | Stop reason: HOSPADM

## 2025-08-11 RX ORDER — KETOROLAC TROMETHAMINE 10 MG/1
10 TABLET, FILM COATED ORAL EVERY 6 HOURS PRN
Qty: 15 TABLET | Refills: 0 | Status: SHIPPED | OUTPATIENT
Start: 2025-08-11

## 2025-08-11 RX ORDER — CYCLOBENZAPRINE HCL 5 MG
5 TABLET ORAL 3 TIMES DAILY PRN
Qty: 15 TABLET | Refills: 0 | Status: SHIPPED | OUTPATIENT
Start: 2025-08-11

## 2025-08-11 RX ADMIN — LIDOCAINE 1 PATCH: 4 PATCH TOPICAL at 21:38

## 2025-08-11 RX ADMIN — KETOROLAC TROMETHAMINE 30 MG: 30 INJECTION INTRAMUSCULAR; INTRAVENOUS at 21:38

## 2025-08-18 DIAGNOSIS — E11.65 TYPE 2 DIABETES MELLITUS WITH HYPERGLYCEMIA, WITHOUT LONG-TERM CURRENT USE OF INSULIN: ICD-10-CM

## 2025-08-18 RX ORDER — TIRZEPATIDE 7.5 MG/.5ML
INJECTION, SOLUTION SUBCUTANEOUS
Qty: 4 ML | Refills: 0 | Status: SHIPPED | OUTPATIENT
Start: 2025-08-18

## (undated) DEVICE — MINOR-LF: Brand: MEDLINE INDUSTRIES, INC.

## (undated) DEVICE — SYR CONTRL LUERLOK 10CC

## (undated) DEVICE — NDL SPINE 22G 31/2IN BLK

## (undated) DEVICE — GOWN,REINFORCE,POLY,SIRUS,BREATH SLV,XLG: Brand: MEDLINE

## (undated) DEVICE — TOWEL,OR,DSP,ST,BLUE,STD,4/PK,20PK/CS: Brand: MEDLINE

## (undated) DEVICE — STRAP STIRUP SLP RNG 19X3.5IN DISP

## (undated) DEVICE — PAD GRND REM POLYHESIVE A/ DISP

## (undated) DEVICE — SLV SCD LEG COMFORT KENDALLSCD MD REPROC

## (undated) DEVICE — PACK,LITHOTOMY,PK I: Brand: MEDLINE

## (undated) DEVICE — PREP TRAY WITH CHG: Brand: MEDLINE INDUSTRIES, INC.

## (undated) DEVICE — ANTIBACTERIAL VIOLET BRAIDED (POLYGLACTIN 910), SYNTHETIC ABSORBABLE SUTURE: Brand: COATED VICRYL

## (undated) DEVICE — SUT VIC PLS CTD BR 0 TIE 18IN VIL

## (undated) DEVICE — TRY FOL URINE METER STATLOCK 16F 5CC

## (undated) DEVICE — DRAPE,UNDERBUTTOCKS,PCH,STERILE: Brand: MEDLINE

## (undated) DEVICE — PAD SANI MAXI W/ADHS SNG WRP 11IN

## (undated) DEVICE — SUT PDS2 0 CT1 27IN Z340H MF VIL

## (undated) DEVICE — STERILE POLYISOPRENE POWDER-FREE SURGICAL GLOVES: Brand: PROTEXIS